# Patient Record
Sex: MALE | Race: BLACK OR AFRICAN AMERICAN | NOT HISPANIC OR LATINO | Employment: FULL TIME | URBAN - METROPOLITAN AREA
[De-identification: names, ages, dates, MRNs, and addresses within clinical notes are randomized per-mention and may not be internally consistent; named-entity substitution may affect disease eponyms.]

---

## 2017-02-13 ENCOUNTER — OFFICE VISIT (OUTPATIENT)
Dept: FAMILY MEDICINE | Facility: CLINIC | Age: 22
End: 2017-02-13
Payer: COMMERCIAL

## 2017-02-13 VITALS — HEIGHT: 68 IN | HEART RATE: 70 BPM

## 2017-02-13 DIAGNOSIS — F33.1 MODERATE EPISODE OF RECURRENT MAJOR DEPRESSIVE DISORDER (H): Primary | ICD-10-CM

## 2017-02-13 DIAGNOSIS — R01.1 HEART MURMUR: ICD-10-CM

## 2017-02-13 PROCEDURE — 99214 OFFICE O/P EST MOD 30 MIN: CPT | Performed by: PHYSICIAN ASSISTANT

## 2017-02-13 ASSESSMENT — ANXIETY QUESTIONNAIRES
5. BEING SO RESTLESS THAT IT IS HARD TO SIT STILL: NEARLY EVERY DAY
6. BECOMING EASILY ANNOYED OR IRRITABLE: MORE THAN HALF THE DAYS
GAD7 TOTAL SCORE: 17
3. WORRYING TOO MUCH ABOUT DIFFERENT THINGS: NEARLY EVERY DAY
7. FEELING AFRAID AS IF SOMETHING AWFUL MIGHT HAPPEN: MORE THAN HALF THE DAYS
2. NOT BEING ABLE TO STOP OR CONTROL WORRYING: NEARLY EVERY DAY
1. FEELING NERVOUS, ANXIOUS, OR ON EDGE: MORE THAN HALF THE DAYS

## 2017-02-13 ASSESSMENT — PATIENT HEALTH QUESTIONNAIRE - PHQ9: 5. POOR APPETITE OR OVEREATING: MORE THAN HALF THE DAYS

## 2017-02-13 NOTE — NURSING NOTE
"Chief Complaint   Patient presents with     Depression       Initial BP (P) 120/70 (BP Location: Right arm, Patient Position: Supine, Cuff Size: Adult Regular)  Pulse 70  Temp (P) 98.5  F (36.9  C) (Oral)  Ht 5' 7.5\" (1.715 m)  Wt (P) 166 lb (75.3 kg)  BMI (P) 25.62 kg/m2 Estimated body mass index is 25.62 kg/(m^2) (pended) as calculated from the following:    Height as of this encounter: 5' 7.5\" (1.715 m).    Weight as of this encounter: (P) 166 lb (75.3 kg).  Medication Reconciliation: complete   Bettye Williamson MA      "

## 2017-02-13 NOTE — PROGRESS NOTES
"  SUBJECTIVE:                                                    Artis Chou is a 21 year old male who presents to clinic today for the following health issues:      Abnormal Mood Symptoms     Onset: ongoing, has never talked to anyone about it    Description:   Depression: YES  Anxiety: YES    Accompanying Signs & Symptoms:  Still participating in activities that you used to enjoy: no  Fatigue: YES  Irritability: YES  Difficulty concentrating: YES  Changes in appetite: YES, hasn't ate for two days  Problems with sleep: YES- sleeps 4 hours at a time, awakes and falls back to sleep, always feels tired  Heart racing/beating fast : YES  Thoughts of hurting yourself or others: yes, not currently     History:   Recent stress: YES  Prior depression hospitalization: None  Family history of depression: unsure  Family history of anxiety: unsure      Precipitating factors:   Alcohol/drug use: YES- marijuana use and occasional alcohol use    Alleviating factors:         Therapies Tried and outcome: None      -------------------------------------    Problem list, Medication list, Allergies, and Medical/Social/Surgical histories reviewed in [facility] and updated as appropriate.    ROS:  A pertinent ROS of the General  Psychological systems is otherwise unremarkable.      OBJECTIVE:                                                    BP (P) 120/70 (BP Location: Right arm, Patient Position: Supine, Cuff Size: Adult Regular)  Pulse 70  Temp (P) 98.5  F (36.9  C) (Oral)  Ht 5' 7.5\" (1.715 m)  Wt (P) 166 lb (75.3 kg)  BMI (P) 25.62 kg/m2   GENERAL: healthy, alert and no distress  MENTAL STATUS EXAM:  Appearance/Behavior: No apparent distress and Casually groomed  Speech: Normal  Mood/Affect: depressed affect  Insight: Adequate  Denies suicidal ideation.   CVS exam: S1, S2 normal, no murmur, click, rub or gallop, regular rate and rhythm, 2/6 systolic murmur heard at 2nd right intercostal space, chest is clear without rales or " wheezing,       Diagnostic test results:  Diagnostic Test Results:  none      ASSESSMENT/PLAN:                                                      1. Moderate episode of recurrent major depressive disorder (H)  Reviewed treatment options and patient will try medication and therapy.   Side effects, risks and benefits of medication were discussed with patient including worsening depression. He will call right away if this occurs.   Follow up in 3-4 weeks for recheck.   - sertraline (ZOLOFT) 50 MG tablet; Take 1/2 tablet (25 mg) for 1-2 weeks, then increase to 1 tablet orally daily  Dispense: 30 tablet; Refill: 0  - MENTAL HEALTH REFERRAL    2. Heart murmur  Found on exam. Patient was very nervous when discussing this and he had a vasovagal episode in the clinic that last 20 seconds. He came to and had some water and crackers and CVS/ PULMONARY exam was normal afterwards.   - Echocardiogram Complete; Future       Nicolasa Russell PA-C  [facility]

## 2017-02-13 NOTE — PATIENT INSTRUCTIONS
-- start on 1/2 tablet daily then if tolerating increase to whole tablet.   -- start in therapy  -- Consider reading book Mind Over Mood  -- strongly encourage you to quit smoking marijuana.    [facility]   Discharged by : Chelle HUTTON, Certified Medical Assistant (DONI)February 13, 2017 12:20 PM    Paper scripts provided to patient : none   If you have any questions regarding to your visit please contact your care team:   Team Gold Clinic Hours Telephone Number   Dr. Chelle Russell PA-C   7am-7pm Monday - Thursday   7am-5pm Fridays  [phone]   (Appointment scheduling available 24/7)   RN Line   [phone] option 2     What options do I have for visits at the clinic other than the traditional office visit?   To expand how we care for you, many of our providers are utilizing electronic visits (e-visits) and telephone visits, when medically appropriate, for interactions with their patients rather than a visit in the clinic. We also offer nurse visits for many medical concerns. Just like any other service, we will bill your insurance company for this type of visit based on time spent on the phone with your provider. Not all insurance companies cover these visits. Please check with your medical insurance if this type of visit is covered. You will be responsible for any charges that are not paid by your insurance.   E-visits via "[employer]hart: generally incur a $35.00 fee.   Telephone visits:   Time spent on the phone: *charged based on time that is spent on the phone in increments of 10 minutes. Estimated cost:   5-10 mins $30.00   11-20 mins. $59.00   21-30 mins. $85.00   Use Avalara (secure email communication and access to your chart) to send your primary care provider a message or make an appointment. Ask someone on your Team how to sign up for Avalara.   For a Price Quote for your services, please call our Consumer Price Line at [phone].   As  always, Thank you for trusting us with your health care needs!                    [facility]:    Scheduling Appointments  [facility]  Call: [phone]    [facility]  Call: [phone]    [facility]  Call: [phone]    WHERE TO GO FOR CARE?    Clinic    Make an appointment if you:       Are sick (cold, cough, flu, sore throat, earache or in pain).       Have a small injury (sprain, small cut, burn or broken bone).       Need a physical exam, Pap smear, vaccine or prescription refill.       Have questions about your health or medicines.    To reach us:      Call [phone] ([phone]). Open 24 hours every day. (For counseling services, call [phone].)    Log into Handmade Mobile at [facility]. (Visit [email or web address] CenterYellowDog Media.org to create an account.) Hospital emergency room    An emergency is a serious or life- threatening problem that must be treated right away.    Call [phone] or get to the hospital if you have:      Very bad or sudden:            - Chest pain or pressure         - Bleeding         - Head or belly pain         - Dizziness or trouble seeing, walking or                          Speaking      Problems breathing      Blood in your vomit or you are coughing up blood      A major injury (knocked out, loss of a finger or limb, rape, broken bone protruding from skin)    A mental health crisis. (Or call the Mental Health Crisis line at 1-162.524.7138 or Suicide Prevention Hotline at 1-498.840.7763.)    Open 24 hours every day. You don't need an appointment.     Urgent care    Visit urgent care for sickness or small injuries when the clinic is closed. You don't need an appointment. To check hours or find an urgent care near you, visit [email or web address]. Online care    Get online care from [facility] for more than 70 common problems, like colds, allergies and infections. Open 24 hours every day at:  [email or web address]   Need help deciding?    For advice about where to be seen, you may call your clinic and ask to speak with a nurse. We're here for you 24 hours every day.         If you are deaf or hard of hearing, please let us know. We provide many free services including sign language interpreters, oral interpreters, TTYs, telephone amplifiers, note takers and written materials.

## 2017-02-13 NOTE — MR AVS SNAPSHOT
After Visit Summary   2/13/2017    Artis Chou    MRN: [id number]           Patient Information     Date Of Birth          1995        Visit Information        Provider Department      2/13/2017 11:20 AM Nicolasa Russell PA-C [facility]        Today's Diagnoses     Moderate episode of recurrent major depressive disorder (H)    -  1    Heart murmur          Care Instructions    -- start on 1/2 tablet daily then if tolerating increase to whole tablet.   -- start in therapy  -- Consider reading book Mind Over Mood  -- strongly encourage you to quit smoking marijuana.    [facility]   Discharged by : Chelle HUTTON Certified Medical Assistant (AAMA)February 13, 2017 12:20 PM    Paper scripts provided to patient : none   If you have any questions regarding to your visit please contact your care team:   Team Gold Clinic Hours Telephone Number   Dr. Chelle Russell PA-C   7am-7pm Monday - Thursday   7am-5pm Fridays  [phone]   (Appointment scheduling available 24/7)   RN Line   [phone] option 2     What options do I have for visits at the clinic other than the traditional office visit?   To expand how we care for you, many of our providers are utilizing electronic visits (e-visits) and telephone visits, when medically appropriate, for interactions with their patients rather than a visit in the clinic. We also offer nurse visits for many medical concerns. Just like any other service, we will bill your insurance company for this type of visit based on time spent on the phone with your provider. Not all insurance companies cover these visits. Please check with your medical insurance if this type of visit is covered. You will be responsible for any charges that are not paid by your insurance.   E-visits via [employer]: generally incur a $35.00 fee.   Telephone visits:   Time spent on the phone: *charged based  on time that is spent on the phone in increments of 10 minutes. Estimated cost:   5-10 mins $30.00   11-20 mins. $59.00   21-30 mins. $85.00   Use MojoPages (secure email communication and access to your chart) to send your primary care provider a message or make an appointment. Ask someone on your Team how to sign up for MojoPages.   For a Price Quote for your services, please call our Consumer Price Line at [phone].   As always, Thank you for trusting us with your health care needs!                    [facility]:    Scheduling Appointments  [facility]  Call: [phone]    [facility]  Call: [phone]    [facility]  Call: [phone]    WHERE TO GO FOR CARE?    Clinic    Make an appointment if you:       Are sick (cold, cough, flu, sore throat, earache or in pain).       Have a small injury (sprain, small cut, burn or broken bone).       Need a physical exam, Pap smear, vaccine or prescription refill.       Have questions about your health or medicines.    To reach us:      Call [phone] ([phone]). Open 24 hours every day. (For counseling services, call [phone].)    Log into MojoPages at [email or web address]. (Visit [email or web address].org to create an account.) Hospital emergency room    An emergency is a serious or life- threatening problem that must be treated right away.    Call [phone] or get to the hospital if you have:      Very bad or sudden:            - Chest pain or pressure         - Bleeding         - Head or belly pain         - Dizziness or trouble seeing, walking or                          Speaking      Problems breathing      Blood in your vomit or you are coughing up blood      A major injury (knocked out, loss of a finger or limb, rape, broken bone protruding from skin)    A mental health crisis. (Or call the Mental Health Crisis line at 1-726.881.6117 or Suicide Prevention Hotline at  6-711-165-4278.)    Open 24 hours every day. You don't need an appointment.     Urgent care    Visit urgent care for sickness or small injuries when the clinic is closed. You don't need an appointment. To check hours or find an urgent care near you, visit [email or web address]. Online care    Get online care from [facility] for more than 70 common problems, like colds, allergies and infections. Open 24 hours every day at: [email or web address]   Need help deciding?    For advice about where to be seen, you may call your clinic and ask to speak with a nurse. We're here for you 24 hours every day.         If you are deaf or hard of hearing, please let us know. We provide many free services including sign language interpreters, oral interpreters, TTYs, telephone amplifiers, note takers and written materials.               Follow-ups after your visit        Additional Services     MENTAL HEALTH REFERRAL       Your provider has referred you to: FMG: [facility] - Counseling (Individual/Couples/Family) - [facility] [phone]   [email or web address]/   *Patient will be contacted by [facility] [facility]'s scheduling partner, [facility]), to schedule an appointment.  Patients may also call [facility] to schedule.    All scheduling is subject to the client's specific insurance plan & benefits, provider/location availability, and provider clinical specialities.  Please arrive 15 minutes early for your first appointment and bring your completed paperwork.    Please be aware that coverage of these services is subject to the terms and limitations of your health insurance plan.  Call member services at your health plan with any benefit or coverage questions.                  Follow-up notes from your care team     Return in about 4 weeks (around 3/13/2017) for depression.      Future tests that were  "ordered for you today     Open Future Orders        Priority Expected Expires Ordered    Echocardiogram Complete Routine  2/13/2018 2/13/2017            Who to contact     If you have questions or need follow up information about today's clinic visit or your schedule please contact [facility] directly at [phone].  Normal or non-critical lab and imaging results will be communicated to you by MyChart, letter or phone within 4 business days after the clinic has received the results. If you do not hear from us within 7 days, please contact the clinic through AetherPalhart or phone. If you have a critical or abnormal lab result, we will notify you by phone as soon as possible.  Submit refill requests through OfferWire or call your pharmacy and they will forward the refill request to us. Please allow 3 business days for your refill to be completed.          Additional Information About Your Visit        MyChart Information     OfferWire gives you secure access to your electronic health record. If you see a primary care provider, you can also send messages to your care team and make appointments. If you have questions, please call your primary care clinic.  If you do not have a primary care provider, please call [phone] and they will assist you.        Care EveryWhere ID     This is your Care EveryWhere ID. This could be used by other organizations to access your [facility] medical records  [id number]        Your Vitals Were     Pulse Height                70 5' 7.5\" (1.715 m)           Blood Pressure from Last 3 Encounters:   02/13/17 (P) 120/70   05/26/16 115/61   10/29/13 115/65    Weight from Last 3 Encounters:   02/13/17 (P) 166 lb (75.3 kg)   05/26/16 155 lb (70.3 kg)   10/29/13 164 lb (74.4 kg)              We Performed the Following     MENTAL HEALTH REFERRAL          Today's Medication Changes          These changes are accurate as of: 2/13/17 12:20 PM.  If you have any questions, ask your nurse " or doctor.               Start taking these medicines.        Dose/Directions    sertraline 50 MG tablet   Commonly known as:  ZOLOFT   Used for:  Moderate episode of recurrent major depressive disorder (H)   Started by:  Nicolasa Russell PA-C        Take 1/2 tablet (25 mg) for 1-2 weeks, then increase to 1 tablet orally daily   Quantity:  30 tablet   Refills:  0         Stop taking these medicines if you haven't already. Please contact your care team if you have questions.     HYDROcodone-acetaminophen 5-325 MG per tablet   Commonly known as:  NORCO   Stopped by:  Nicolasa Russell PA-C           ibuprofen 600 MG tablet   Commonly known as:  ADVIL/MOTRIN   Stopped by:  Nicolasa Russell PA-C           PAIN & FEVER EXTRA STRENGTH 500 MG tablet   Generic drug:  acetaminophen   Stopped by:  Nicolasa Russell PA-C           sulfamethoxazole-trimethoprim 800-160 MG per tablet   Commonly known as:  BACTRIM DS/SEPTRA DS   Stopped by:  Nicolasa Russell PA-C                Where to get your medicines      These medications were sent to [address] [address]     Phone:  [phone]     sertraline 50 MG tablet                Primary Care Provider Office Phone #    [facility] [phone]       [address] [place] [address]        Thank you!     Thank you for choosing [facility]  for your care. Our goal is always to provide you with excellent care. Hearing back from our patients is one way we can continue to improve our services. Please take a few minutes to complete the written survey that you may receive in the mail after your visit with us. Thank you!             Your Updated Medication List - Protect others around you: Learn how to safely use, store and throw away your medicines at www.disposemymeds.org.          This list is accurate as of: 2/13/17 12:20 PM.   Always use your most recent med list.                   Brand Name Dispense Instructions for use    sertraline 50 MG tablet    ZOLOFT    30 tablet    Take 1/2 tablet (25 mg) for 1-2 weeks, then increase to 1 tablet orally daily

## 2017-02-14 ASSESSMENT — PATIENT HEALTH QUESTIONNAIRE - PHQ9: SUM OF ALL RESPONSES TO PHQ QUESTIONS 1-9: 20

## 2017-02-14 ASSESSMENT — ANXIETY QUESTIONNAIRES: GAD7 TOTAL SCORE: 17

## 2017-02-15 ENCOUNTER — RADIANT APPOINTMENT (OUTPATIENT)
Dept: CARDIOLOGY | Facility: CLINIC | Age: 22
End: 2017-02-15
Attending: PHYSICIAN ASSISTANT
Payer: COMMERCIAL

## 2017-02-15 ENCOUNTER — TELEPHONE (OUTPATIENT)
Dept: FAMILY MEDICINE | Facility: CLINIC | Age: 22
End: 2017-02-15

## 2017-02-15 DIAGNOSIS — R01.1 HEART MURMUR: ICD-10-CM

## 2017-02-15 PROCEDURE — 93306 TTE W/DOPPLER COMPLETE: CPT | Performed by: INTERNAL MEDICINE

## 2017-02-15 NOTE — TELEPHONE ENCOUNTER
Please call patient to let him know his Echo is normal. There are no concerns for his  Heart. His murmur is likely functional.    Nicolasa Russell, MYRNA, PA-C  [facility]

## 2017-02-15 NOTE — TELEPHONE ENCOUNTER
Called and spoke to patient, relayed message (below). Patient verbalized understanding.      Jia Montano RN   February 15, 2017 5:28 PM  [facility] Triage   [phone]

## 2017-03-03 ENCOUNTER — OFFICE VISIT (OUTPATIENT)
Dept: FAMILY MEDICINE | Facility: CLINIC | Age: 22
End: 2017-03-03
Payer: COMMERCIAL

## 2017-03-03 VITALS
HEART RATE: 70 BPM | WEIGHT: 165 LBS | TEMPERATURE: 98.1 F | HEIGHT: 68 IN | SYSTOLIC BLOOD PRESSURE: 116 MMHG | BODY MASS INDEX: 25.01 KG/M2 | DIASTOLIC BLOOD PRESSURE: 68 MMHG

## 2017-03-03 DIAGNOSIS — F12.10 MARIJUANA ABUSE: ICD-10-CM

## 2017-03-03 DIAGNOSIS — F33.1 MAJOR DEPRESSIVE DISORDER, RECURRENT EPISODE, MODERATE (H): Primary | ICD-10-CM

## 2017-03-03 DIAGNOSIS — F41.1 GAD (GENERALIZED ANXIETY DISORDER): ICD-10-CM

## 2017-03-03 PROCEDURE — 99213 OFFICE O/P EST LOW 20 MIN: CPT | Performed by: PHYSICIAN ASSISTANT

## 2017-03-03 RX ORDER — SERTRALINE HYDROCHLORIDE 100 MG/1
100 TABLET, FILM COATED ORAL DAILY
Qty: 30 TABLET | Refills: 1 | Status: SHIPPED | OUTPATIENT
Start: 2017-03-03 | End: 2017-04-10

## 2017-03-03 RX ORDER — HYDROXYZINE HYDROCHLORIDE 25 MG/1
25-50 TABLET, FILM COATED ORAL EVERY 6 HOURS PRN
Qty: 60 TABLET | Refills: 1 | Status: SHIPPED | OUTPATIENT
Start: 2017-03-03 | End: 2017-04-10

## 2017-03-03 ASSESSMENT — ANXIETY QUESTIONNAIRES
2. NOT BEING ABLE TO STOP OR CONTROL WORRYING: NEARLY EVERY DAY
3. WORRYING TOO MUCH ABOUT DIFFERENT THINGS: NEARLY EVERY DAY
6. BECOMING EASILY ANNOYED OR IRRITABLE: NEARLY EVERY DAY
GAD7 TOTAL SCORE: 20
1. FEELING NERVOUS, ANXIOUS, OR ON EDGE: NEARLY EVERY DAY
5. BEING SO RESTLESS THAT IT IS HARD TO SIT STILL: NEARLY EVERY DAY
7. FEELING AFRAID AS IF SOMETHING AWFUL MIGHT HAPPEN: MORE THAN HALF THE DAYS

## 2017-03-03 ASSESSMENT — PATIENT HEALTH QUESTIONNAIRE - PHQ9: 5. POOR APPETITE OR OVEREATING: NEARLY EVERY DAY

## 2017-03-03 NOTE — PATIENT INSTRUCTIONS
[facility]   Discharged by : Bettye Williamson MA    Paper scripts provided to patient : no     If you have any questions regarding your visit please contact your care team:     Team Gold Clinic Hours Telephone Number   Dr. Chelle Russell PA-C   7am-7pm Monday - Thursday   7am-5pm Fridays  [phone]   (Appointment scheduling available 24/7)   RN Line   [phone] option 2       For a Price Quote for your services, please call our Consumer Price Line at [phone].     What options do I have for visits at the clinic other than the traditional office visit?     To expand how we care for you, many of our providers are utilizing electronic visits (e-visits) and telephone visits, when medically appropriate, for interactions with their patients rather than a visit in the clinic. We also offer nurse visits for many medical concerns. Just like any other service, we will bill your insurance company for this type of visit based on time spent on the phone with your provider. Not all insurance companies cover these visits. Please check with your medical insurance if this type of visit is covered. You will be responsible for any charges that are not paid by your insurance.   E-visits via [employer]: generally incur a $35.00 fee.     Telephone visits:   Time spent on the phone: *charged based on time that is spent on the phone in increments of 10 minutes. Estimated cost:   5-10 mins $30.00   11-20 mins. $59.00   21-30 mins. $85.00     Use Broken Envelope Productionshart (secure email communication and access to your chart) to send your primary care provider a message or make an appointment. Ask someone on your Team how to sign up for Easy-Pointt.     As always, Thank you for trusting us with your health care needs!      [facility]:    Scheduling Appointments  [facility]  Call: [phone]    [facility]  Call:  [phone]    [facility]  Call: [phone]      WHERE TO GO FOR CARE?    Clinic    Make an appointment if you:       Are sick (cold, cough, flu, sore throat, earache or in pain).       Have a small injury (sprain, small cut, burn or broken bone).       Need a physical exam, Pap smear, vaccine or prescription refill.       Have questions about your health or medicines.    To reach us:      Call [phone] ([phone]). Open 24 hours every day. (For counseling services, call [phone].)    Log into Airsynergy at [employer]. (Visit "[employer].LawPivot to create an account.) Hospital emergency room    An emergency is a serious or life- threatening problem that must be treated right away.    Call [phone] or get to the hospital if you have:      Very bad or sudden:            - Chest pain or pressure         - Bleeding         - Head or belly pain         - Dizziness or trouble seeing, walking or                          Speaking      Problems breathing      Blood in your vomit or you are coughing up blood      A major injury (knocked out, loss of a finger or limb, rape, broken bone protruding from skin)    A mental health crisis. (Or call the Mental Health Crisis line at 1-618.804.5679 or Suicide Prevention Hotline at 1-552.388.1417.)    Open 24 hours every day. You don't need an appointment.     Urgent care    Visit urgent care for sickness or small injuries when the clinic is closed. You don't need an appointment. To check hours or find an urgent care near you, visit www.[email or web address]. Online care    Get online care from [employer] for more than 70 common problems, like colds, allergies and infections. Open 24 hours every day at: [email or web address]   Need help deciding?    For advice about where to be seen, you may call your clinic and ask to speak with a nurse. We're here for you 24 hours every day.         If you are deaf or hard of hearing, please let us  know. We provide many free services including sign language interpreters, oral interpreters, TTYs, telephone amplifiers, note takers and written materials.

## 2017-03-03 NOTE — NURSING NOTE
"Chief Complaint   Patient presents with     Follow Up For     depression/anxiety       Initial /68 (BP Location: Right arm, Patient Position: Chair, Cuff Size: Adult Regular)  Pulse 70  Temp 98.1  F (36.7  C) (Oral)  Ht 5' 7.5\" (1.715 m)  Wt 165 lb (74.8 kg)  BMI 25.46 kg/m2 Estimated body mass index is 25.46 kg/(m^2) as calculated from the following:    Height as of this encounter: 5' 7.5\" (1.715 m).    Weight as of this encounter: 165 lb (74.8 kg).  Medication Reconciliation: complete   Bettye Williamson MA      "

## 2017-03-03 NOTE — PROGRESS NOTES
"  SUBJECTIVE:                                                    Artis Chou is a 21 year old male who presents to clinic today for the following health issues:        Depression and Anxiety Follow-Up    Status since last visit: Improved, use of zoloft going well    Tried friend's klonopin which was very helpful for his social anxiety problem.    Other associated symptoms:None    Complicating factors:     Significant life event: No     Current substance abuse: None    PHQ-9 SCORE 2/13/2017   Total Score 20     KATY-7 SCORE 2/13/2017   Total Score 17        PHQ-9  English      PHQ-9   Any Language     GAD7      Problems taking medications regularly: No    Medication side effects: none  Diet: regular (no restrictions)    Working at [employer].   -------------------------------------    Problem list, Medication list, Allergies, and Medical/Social/Surgical histories [facility] and updated as appropriate.    ROS:  A pertinent ROS of the General  Psychological systems is otherwise unremarkable.      OBJECTIVE:                                                    /68 (BP Location: Right arm, Patient Position: Chair, Cuff Size: Adult Regular)  Pulse 70  Temp 98.1  F (36.7  C) (Oral)  Ht 5' 7.5\" (1.715 m)  Wt 165 lb (74.8 kg)  BMI 25.46 kg/m2 .  GENERAL:: healthy, alert and no distress  PSYCH: Alert and oriented times 3; speech- coherent , normal rate and volume; able to articulate logical thoughts, able to abstract reason, no tangential thoughts, no hallucinations or delusions, affect- anxious    Diagnostic test results:  Diagnostic Test Results:  none      ASSESSMENT/PLAN:                                                          ICD-10-CM    1. Major depressive disorder, recurrent episode, moderate (H) F33.1 sertraline (ZOLOFT) 100 MG tablet   2. KATY (generalized anxiety disorder) F41.1 hydrOXYzine (ATARAX) 25 MG tablet       Discussed with patient that given his use of marijuana would " recommend against  Using benzo's for treatment of acute anxiety.  Therapy is most beneficial, then trial of hydroxyzine  Side effects, risks and benefits of medication were discussed with patient.    Increase zoloft to 100 mg to see if improvement in symptoms.    Follow up with Provider - 1 month         Nicolasa Russell PA-C  [facility]

## 2017-03-03 NOTE — MR AVS SNAPSHOT
After Visit Summary   3/3/2017    Artis Chou    MRN: [id number]           Patient Information     Date Of Birth          1995        Visit Information        Provider Department      3/3/2017 11:00 AM Nicolasa Russell PA-C [facility]        Today's Diagnoses     Major depressive disorder, recurrent episode, moderate (H)    -  1    KATY (generalized anxiety disorder)          Care Instructions    [facility]   Discharged by : Bettye Williamson MA    Paper scripts provided to patient : no     If you have any questions regarding your visit please contact your care team:     [facility] Clinic Hours Telephone Number   Dr. Chelle Russell PA-C   7am-7pm Monday - Thursday   7am-5pm Fridays  [phone]   (Appointment scheduling available 24/7)   RN Line   [phone] option 2       For a Price Quote for your services, please call our [facility] Price Line at [phone].     What options do I have for visits at the clinic other than the traditional office visit?     To expand how we care for you, many of our providers are utilizing electronic visits (e-visits) and telephone visits, when medically appropriate, for interactions with their patients rather than a visit in the clinic. We also offer nurse visits for many medical concerns. Just like any other service, we will bill your insurance company for this type of visit based on time spent on the phone with your provider. Not all insurance companies cover these visits. Please check with your medical insurance if this type of visit is covered. You will be responsible for any charges that are not paid by your insurance.   E-visits via WaveDeck: generally incur a $35.00 fee.     Telephone visits:   Time spent on the phone: *charged based on time that is spent on the phone in increments of 10 minutes. Estimated cost:   5-10 mins $30.00   11-20 mins. $59.00    21-30 mins. $85.00     Use Asoka (secure email communication and access to your chart) to send your primary care provider a message or make an appointment. Ask someone on your Team how to sign up for Asoka.     As always, Thank you for trusting us with your health care needs!      [facility]:    Scheduling Appointments  Denzel Griffin [facility]  Call: [phone]    [facility]  Call: [phone]    [facility]  Call: [phone]      WHERE TO GO FOR CARE?    Clinic    Make an appointment if you:       Are sick (cold, cough, flu, sore throat, earache or in pain).       Have a small injury (sprain, small cut, burn or broken bone).       Need a physical exam, Pap smear, vaccine or prescription refill.       Have questions about your health or medicines.    To reach us:      Call [phone] ([phone]). Open 24 hours every day. (For counseling services, call [phone].)    Log into Asoka at [email or web address]. (Visit [email or web address] to create an account.) Hospital emergency room    An emergency is a serious or life- threatening problem that must be treated right away.    Call [phone] or get to the hospital if you have:      Very bad or sudden:            - Chest pain or pressure         - Bleeding         - Head or belly pain         - Dizziness or trouble seeing, walking or                          Speaking      Problems breathing      Blood in your vomit or you are coughing up blood      A major injury (knocked out, loss of a finger or limb, rape, broken bone protruding from skin)    A mental health crisis. (Or call the Mental Health Crisis line at 1-804.182.2785 or Suicide Prevention Hotline at 1-892.897.2546.)    Open 24 hours every day. You don't need an appointment.     Urgent care    Visit urgent care for sickness or small injuries when the clinic is closed. You don't need an appointment. To check hours or  find an urgent care near you, visit [email or web address]. Online care    Get online care from [facility] for more than 70 common problems, like colds, allergies and infections. Open 24 hours every day at: [email or web address]   Need help deciding?    For advice about where to be seen, you may call your clinic and ask to speak with a nurse. We're here for you 24 hours every day.         If you are deaf or hard of hearing, please let us know. We provide many free services including sign language interpreters, oral interpreters, TTYs, telephone amplifiers, note takers and written materials.                       Follow-ups after your visit        Your next 10 appointments already scheduled     Mar 13, 2017  1:00 PM CDT   Office Visit with Nicolasa Russell PA-C   [facility] ([facility]    [address] [place] [id number] [phone]           Bring a current list of meds and any records pertaining to this visit.  For Physicals, please bring immunization records and any forms needing to be filled out.  Please arrive 10 minutes early to complete paperwork.              Who to contact     If you have questions or need follow up information about today's clinic visit or your schedule please contact [facility] directly at [phone].  Normal or non-critical lab and imaging results will be communicated to you by MyChart, letter or phone within 4 business days after the clinic has received the results. If you do not hear from us within 7 days, please contact the clinic through MyChart or phone. If you have a critical or abnormal lab result, we will notify you by phone as soon as possible.  Submit refill requests through Meditech Solution or call your pharmacy and they will forward the refill request to us. Please allow 3 business days for your refill to be completed.          Additional Information About Your Visit        [id number] Security Information      "iZotope gives you secure access to your electronic health record. If you see a primary care provider, you can also send messages to your care team and make appointments. If you have questions, please call your primary care clinic.  If you do not have a primary care provider, please call [phone] and they will assist you.        Care EveryWhere ID     This is your Care EveryWhere ID. This could be used by other organizations to access your [place] medical records  [id number]        Your Vitals Were     Pulse Temperature Height BMI (Body Mass Index)          70 98.1  F (36.7  C) (Oral) 5' 7.5\" (1.715 m) 25.46 kg/m2         Blood Pressure from Last 3 Encounters:   03/03/17 116/68   02/13/17 (P) 120/70   05/26/16 115/61    Weight from Last 3 Encounters:   03/03/17 165 lb (74.8 kg)   02/13/17 (P) 166 lb (75.3 kg)   05/26/16 155 lb (70.3 kg)              Today, you had the following     No orders found for display         Today's Medication Changes          These changes are accurate as of: 3/3/17 11:40 AM.  If you have any questions, ask your nurse or doctor.               Start taking these medicines.        Dose/Directions    hydrOXYzine 25 MG tablet   Commonly known as:  ATARAX   Used for:  KATY (generalized anxiety disorder)   Started by:  Nicolasa Russell PA-C        Dose:  25-50 mg   Take 1-2 tablets (25-50 mg) by mouth every 6 hours as needed for anxiety   Quantity:  60 tablet   Refills:  1         These medicines have changed or have updated prescriptions.        Dose/Directions    * sertraline 50 MG tablet   Commonly known as:  ZOLOFT   This may have changed:  Another medication with the same name was added. Make sure you understand how and when to take each.   Used for:  Moderate episode of recurrent major depressive disorder (H)   Changed by:  Nicolasa Russell PA-C        Take 1/2 tablet (25 mg) for 1-2 weeks, then increase to 1 tablet orally daily   Quantity:  30 tablet   Refills:  0       * " sertraline 100 MG tablet   Commonly known as:  ZOLOFT   This may have changed:  You were already taking a medication with the same name, and this prescription was added. Make sure you understand how and when to take each.   Used for:  Major depressive disorder, recurrent episode, moderate (H)   Changed by:  Nicolasa Russell PA-C        Dose:  100 mg   Take 1 tablet (100 mg) by mouth daily   Quantity:  30 tablet   Refills:  1       * Notice:  This list has 2 medication(s) that are the same as other medications prescribed for you. Read the directions carefully, and ask your doctor or other care provider to review them with you.         Where to get your medicines      These medications were sent to [place] [id number] [place] [facility] - [place] - [address]  [address] [place] [address]     Phone:  [phone]     hydrOXYzine 25 MG tablet    sertraline 100 MG tablet                Primary Care Provider Office Phone #    [facility] [phone]       [address] [place] [id number]        Thank you!     Thank you for choosing [facility]  for your care. Our goal is always to provide you with excellent care. Hearing back from our patients is one way we can continue to improve our services. Please take a few minutes to complete the written survey that you may receive in the mail after your visit with us. Thank you!             Your Updated Medication List - Protect others around you: Learn how to safely use, store and throw away your medicines at www.disposemymeds.org.          This list is accurate as of: 3/3/17 11:40 AM.  Always use your most recent med list.                   Brand Name Dispense Instructions for use    hydrOXYzine 25 MG tablet    ATARAX    60 tablet    Take 1-2 tablets (25-50 mg) by mouth every 6 hours as needed for anxiety       * sertraline 50 MG tablet    ZOLOFT    30 tablet    Take 1/2 tablet (25 mg) for 1-2 weeks, then increase to 1 tablet  orally daily       * sertraline 100 MG tablet    ZOLOFT    30 tablet    Take 1 tablet (100 mg) by mouth daily       * Notice:  This list has 2 medication(s) that are the same as other medications prescribed for you. Read the directions carefully, and ask your doctor or other care provider to review them with you.

## 2017-03-04 ASSESSMENT — PATIENT HEALTH QUESTIONNAIRE - PHQ9: SUM OF ALL RESPONSES TO PHQ QUESTIONS 1-9: 14

## 2017-03-04 ASSESSMENT — ANXIETY QUESTIONNAIRES: GAD7 TOTAL SCORE: 20

## 2017-04-10 ENCOUNTER — OFFICE VISIT (OUTPATIENT)
Dept: FAMILY MEDICINE | Facility: CLINIC | Age: 22
End: 2017-04-10
Payer: COMMERCIAL

## 2017-04-10 VITALS
SYSTOLIC BLOOD PRESSURE: 128 MMHG | HEART RATE: 84 BPM | DIASTOLIC BLOOD PRESSURE: 82 MMHG | HEIGHT: 68 IN | BODY MASS INDEX: 24.25 KG/M2 | TEMPERATURE: 98.3 F | WEIGHT: 160 LBS

## 2017-04-10 DIAGNOSIS — Z11.3 SCREENING EXAMINATION FOR VENEREAL DISEASE: ICD-10-CM

## 2017-04-10 DIAGNOSIS — F41.1 GAD (GENERALIZED ANXIETY DISORDER): ICD-10-CM

## 2017-04-10 DIAGNOSIS — F33.1 MAJOR DEPRESSIVE DISORDER, RECURRENT EPISODE, MODERATE (H): ICD-10-CM

## 2017-04-10 DIAGNOSIS — F12.20 CANNABIS DEPENDENCE (H): Primary | ICD-10-CM

## 2017-04-10 PROCEDURE — 99213 OFFICE O/P EST LOW 20 MIN: CPT | Performed by: PHYSICIAN ASSISTANT

## 2017-04-10 RX ORDER — SERTRALINE HYDROCHLORIDE 100 MG/1
100 TABLET, FILM COATED ORAL DAILY
Qty: 90 TABLET | Refills: 1 | Status: SHIPPED | OUTPATIENT
Start: 2017-04-10 | End: 2018-10-30

## 2017-04-10 RX ORDER — HYDROXYZINE HYDROCHLORIDE 25 MG/1
25-50 TABLET, FILM COATED ORAL EVERY 6 HOURS PRN
Qty: 60 TABLET | Refills: 5 | Status: SHIPPED | OUTPATIENT
Start: 2017-04-10 | End: 2018-10-30

## 2017-04-10 ASSESSMENT — ANXIETY QUESTIONNAIRES
6. BECOMING EASILY ANNOYED OR IRRITABLE: MORE THAN HALF THE DAYS
3. WORRYING TOO MUCH ABOUT DIFFERENT THINGS: SEVERAL DAYS
5. BEING SO RESTLESS THAT IT IS HARD TO SIT STILL: MORE THAN HALF THE DAYS
2. NOT BEING ABLE TO STOP OR CONTROL WORRYING: SEVERAL DAYS
7. FEELING AFRAID AS IF SOMETHING AWFUL MIGHT HAPPEN: SEVERAL DAYS
1. FEELING NERVOUS, ANXIOUS, OR ON EDGE: SEVERAL DAYS
GAD7 TOTAL SCORE: 9

## 2017-04-10 ASSESSMENT — PATIENT HEALTH QUESTIONNAIRE - PHQ9: 5. POOR APPETITE OR OVEREATING: SEVERAL DAYS

## 2017-04-10 NOTE — MR AVS SNAPSHOT
After Visit Summary   4/10/2017    Artis Chou    MRN: [id number]           Patient Information     Date Of Birth          1995        Visit Information        Provider Department      4/10/2017 2:00 PM Nicolasa Russell PA-C [facility]        Today's Diagnoses     Screening examination for venereal disease        Need for HPV vaccine        KATY (generalized anxiety disorder)        Major depressive disorder, recurrent episode, moderate (H)          Care Instructions    [facility]   Discharged by : Bettye Williamson MA    Paper scripts provided to patient :  no     If you have any questions regarding your visit please contact your care team:     [facility] Clinic Hours Telephone Number   Dr. Chelle Russell PA-C   7am-7pm Monday - Thursday   7am-5pm Fridays  [phone]   (Appointment scheduling available 24/7)   RN Line   [phone] option 2       For a Price Quote for your services, please call our [employer] Price Line at [phone].     What options do I have for visits at the clinic other than the traditional office visit?     To expand how we care for you, many of our providers are utilizing electronic visits (e-visits) and telephone visits, when medically appropriate, for interactions with their patients rather than a visit in the clinic. We also offer nurse visits for many medical concerns. Just like any other service, we will bill your insurance company for this type of visit based on time spent on the phone with your provider. Not all insurance companies cover these visits. Please check with your medical insurance if this type of visit is covered. You will be responsible for any charges that are not paid by your insurance.   E-visits via Yik Yak: generally incur a $35.00 fee.     Telephone visits:   Time spent on the phone: *charged based on time that is spent on the phone in increments  of 10 minutes. Estimated cost:   5-10 mins $30.00   11-20 mins. $59.00   21-30 mins. $85.00     Use [employer] (secure email communication and access to your chart) to send your primary care provider a message or make an appointment. Ask someone on your Team how to sign up for [employer].     As always, Thank you for trusting us with your health care needs!      [facility]:    Scheduling Appointments  Denzel Griffin [facility]  Call: [phone]    [facility]  Call: [phone]    [facility]  Call: [phone]      WHERE TO GO FOR CARE?    Clinic    Make an appointment if you:       Are sick (cold, cough, flu, sore throat, earache or in pain).       Have a small injury (sprain, small cut, burn or broken bone).       Need a physical exam, Pap smear, vaccine or prescription refill.       Have questions about your health or medicines.    To reach us:      Call [phone] ([phone]). Open 24 hours every day. (For counseling services, call [phone].)    Log into [employer] at [email or web address]. (Visit [email or web address] to create an account.) Hospital emergency room    An emergency is a serious or life- threatening problem that must be treated right away.    Call 443 or get to the hospital if you have:      Very bad or sudden:            - Chest pain or pressure         - Bleeding         - Head or belly pain         - Dizziness or trouble seeing, walking or                          Speaking      Problems breathing      Blood in your vomit or you are coughing up blood      A major injury (knocked out, loss of a finger or limb, rape, broken bone protruding from skin)    A mental health crisis. (Or call the Mental Health Crisis line at 1-748.541.3178 or Suicide Prevention Hotline at 1-481.717.9640.)    Open 24 hours every day. You don't need an appointment.     Urgent care    Visit urgent care for sickness or small injuries when  the clinic is closed. You don't need an appointment. To check hours or find an urgent care near you, visit [email or web address]. Online care    Get online care from [facility] for more than 70 common problems, like colds, allergies and infections. Open 24 hours every day at: [email or web address]   Need help deciding?    For advice about where to be seen, you may call your clinic and ask to speak with a nurse. We're here for you 24 hours every day.         If you are deaf or hard of hearing, please let us know. We provide many free services including sign language interpreters, oral interpreters, TTYs, telephone amplifiers, note takers and written materials.                       Follow-ups after your visit        Future tests that were ordered for you today     Open Future Orders        Priority Expected Expires Ordered    NEISSERIA GONORRHOEA PCR Routine  5/10/2017 4/10/2017    CHLAMYDIA TRACHOMATIS PCR Routine  5/10/2017 4/10/2017            Who to contact     If you have questions or need follow up information about today's clinic visit or your schedule please contact [facility] directly at [phone].  Normal or non-critical lab and imaging results will be communicated to you by MyChart, letter or phone within 4 business days after the clinic has received the results. If you do not hear from us within 7 days, please contact the clinic through thrdPlacehart or phone. If you have a critical or abnormal lab result, we will notify you by phone as soon as possible.  Submit refill requests through [employer] or call your pharmacy and they will forward the refill request to us. Please allow 3 business days for your refill to be completed.          Additional Information About Your Visit        thrdPlaceharMaxPreps Information     [employer] gives you secure access to your electronic health record. If you see a primary care provider, you can also send messages to your care team and make appointments. If you have  "questions, please call your primary care clinic.  If you do not have a primary care provider, please call [phone] and they will assist you.        Care EveryWhere ID     This is your Care EveryWhere ID. This could be used by other organizations to access your [employer] medical records  [id number]        Your Vitals Were     Pulse Temperature Height BMI (Body Mass Index)          84 98.3  F (36.8  C) (Oral) 5' 7.5\" (1.715 m) 24.69 kg/m2         Blood Pressure from Last 3 Encounters:   04/10/17 128/82   03/03/17 116/68   02/13/17 (P) 120/70    Weight from Last 3 Encounters:   04/10/17 160 lb (72.6 kg)   03/03/17 165 lb (74.8 kg)   02/13/17 (P) 166 lb (75.3 kg)                 Where to get your medicines      These medications were sent to [place] [address] [place] [facility] [place] - [address]  [address] [place] [address]     Phone:  [phone]     hydrOXYzine 25 MG tablet    sertraline 100 MG tablet          Primary Care Provider Office Phone #    [facility] [phone]       [address] [facility] [address]        Thank you!     Thank you for choosing [facility]  for your care. Our goal is always to provide you with excellent care. Hearing back from our patients is one way we can continue to improve our services. Please take a few minutes to complete the written survey that you may receive in the mail after your visit with us. Thank you!             Your Updated Medication List - Protect others around you: Learn how to safely use, store and throw away your medicines at www.disposemymeds.org.          This list is accurate as of: 4/10/17  2:40 PM.  Always use your most recent med list.                   Brand Name Dispense Instructions for use    hydrOXYzine 25 MG tablet    ATARAX    60 tablet    Take 1-2 tablets (25-50 mg) by mouth every 6 hours as needed for anxiety       sertraline 100 MG tablet    ZOLOFT    90 tablet    Take 1 tablet (100 mg) by " mouth daily

## 2017-04-10 NOTE — NURSING NOTE
"Chief Complaint   Patient presents with     Depression       Initial /82 (BP Location: Right arm, Patient Position: Chair, Cuff Size: Adult Regular)  Pulse 84  Temp 98.3  F (36.8  C) (Oral)  Ht 5' 7.5\" (1.715 m)  Wt 160 lb (72.6 kg)  BMI 24.69 kg/m2 Estimated body mass index is 24.69 kg/(m^2) as calculated from the following:    Height as of this encounter: 5' 7.5\" (1.715 m).    Weight as of this encounter: 160 lb (72.6 kg).  Medication Reconciliation: complete    "

## 2017-04-10 NOTE — PATIENT INSTRUCTIONS
[facility]   Discharged by : Bettye Williamson MA    Paper scripts provided to patient :  no     If you have any questions regarding your visit please contact your care team:     [facility] Clinic Hours Telephone Number   Dr. Chelle Russell PA-C   7am-7pm Monday - Thursday   7am-5pm Fridays  [phone]   (Appointment scheduling available 24/7)   RN Line   [phone] option 2       For a Price Quote for your services, please call our Consumer Price Line at [phone].     What options do I have for visits at the clinic other than the traditional office visit?     To expand how we care for you, many of our providers are utilizing electronic visits (e-visits) and telephone visits, when medically appropriate, for interactions with their patients rather than a visit in the clinic. We also offer nurse visits for many medical concerns. Just like any other service, we will bill your insurance company for this type of visit based on time spent on the phone with your provider. Not all insurance companies cover these visits. Please check with your medical insurance if this type of visit is covered. You will be responsible for any charges that are not paid by your insurance.   E-visits via Wacai: generally incur a $35.00 fee.     Telephone visits:   Time spent on the phone: *charged based on time that is spent on the phone in increments of 10 minutes. Estimated cost:   5-10 mins $30.00   11-20 mins. $59.00   21-30 mins. $85.00     Use [employer]art (secure email communication and access to your chart) to send your primary care provider a message or make an appointment. Ask someone on your Team how to sign up for [employer]Plann[employer].     As always, Thank you for trusting us with your health care needs!      [facility]:    Scheduling Appointments  [facility]  Call: [phone]    [facility]  Call:  [phone]    [facility]  Call: [phone]      WHERE TO GO FOR CARE?    Clinic    Make an appointment if you:       Are sick (cold, cough, flu, sore throat, earache or in pain).       Have a small injury (sprain, small cut, burn or broken bone).       Need a physical exam, Pap smear, vaccine or prescription refill.       Have questions about your health or medicines.    To reach us:      Call [phone] ([phone]). Open 24 hours every day. (For counseling services, call [phone].)    Log into [employer] at echoBase. (Visit [email or web address] to create an account.) Hospital emergency room    An emergency is a serious or life- threatening problem that must be treated right away.    Call [phone] or get to the hospital if you have:      Very bad or sudden:            - Chest pain or pressure         - Bleeding         - Head or belly pain         - Dizziness or trouble seeing, walking or                          Speaking      Problems breathing      Blood in your vomit or you are coughing up blood      A major injury (knocked out, loss of a finger or limb, rape, broken bone protruding from skin)    A mental health crisis. (Or call the Mental Health Crisis line at 1-488.911.7221 or Suicide Prevention Hotline at 1-680.996.3705.)    Open 24 hours every day. You don't need an appointment.     Urgent care    Visit urgent care for sickness or small injuries when the clinic is closed. You don't need an appointment. To check hours or find an urgent care near you, visit [email or web address]. Online care    Get online care from [employer] for more than 70 common problems, like colds, allergies and infections. Open 24 hours every day at: [email or web address]   Need help deciding?    For advice about where to be seen, you may call your clinic and ask to speak with a nurse. We're here for you 24 hours every day.         If you are deaf or hard of hearing, please let us  know. We provide many free services including sign language interpreters, oral interpreters, TTYs, telephone amplifiers, note takers and written materials.

## 2017-04-10 NOTE — PROGRESS NOTES
"  SUBJECTIVE:                                                    Artis Chou is a 22 year old male who presents to clinic today for the following health issues:        Depression and Anxiety Follow-Up    Status since last visit: fluctuates    Other associated symptoms:None    Complicating factors:     Significant life event: Yes-  Started new job in February     Current substance abuse: Cannabis, not interested in quitting.     Doing well on zoloft feels like moods are stable.  Hydroxyzine is helping with panic attacks.   Tried therapy, wasn't a good fit with therapist.       PHQ-9 SCORE 2/13/2017 3/3/2017 4/10/2017   Total Score 20 14 13     KATY-7 SCORE 2/13/2017 3/3/2017 4/10/2017   Total Score 17 20 9        PHQ-9  English      PHQ-9   Any Language     GAD7       Amount of exercise or physical activity: None    Problems taking medications regularly: No    Medication side effects: none    Diet: regular (no restrictions)        -------------------------------------    Problem list, Medication list, Allergies, and Medical/Social/Surgical histories reviewed in [employer] and updated as appropriate.    ROS:  A pertinent ROS of the General  Psychological systems is otherwise unremarkable.      OBJECTIVE:                                                    /82 (BP Location: Right arm, Patient Position: Chair, Cuff Size: Adult Regular)  Pulse 84  Temp 98.3  F (36.8  C) (Oral)  Ht 5' 7.5\" (1.715 m)  Wt 160 lb (72.6 kg)  BMI 24.69 kg/m2   GENERAL: healthy, alert and no distress  MENTAL STATUS EXAM:  Appearance/Behavior: No apparent distress and Casually groomed  Speech: Normal  Mood/Affect: normal affect  Insight: Adequate    Diagnostic test results:  Diagnostic Test Results:  none      ASSESSMENT/PLAN:                                                        ICD-10-CM    1. KATY (generalized anxiety disorder) F41.1 hydrOXYzine (ATARAX) 25 MG tablet   2. Major depressive disorder, recurrent episode, moderate (H) " F33.1 sertraline (ZOLOFT) 100 MG tablet   3. Screening examination for venereal disease Z11.3 NEISSERIA GONORRHOEA PCR     CHLAMYDIA TRACHOMATIS PCR     Moods are stable. Encouraged him to try another therapist.   Continue using medications as needed.   Strongly encouraged patient to quit smoking cannabis as this would help improve his energy level and sleep patterns.     Follow up with Provider - 3-6 months, sooner if needed.    Nicolasa Russell PA-C  [facility]

## 2017-04-11 ASSESSMENT — PATIENT HEALTH QUESTIONNAIRE - PHQ9: SUM OF ALL RESPONSES TO PHQ QUESTIONS 1-9: 13

## 2017-04-11 ASSESSMENT — ANXIETY QUESTIONNAIRES: GAD7 TOTAL SCORE: 9

## 2017-04-14 DIAGNOSIS — Z11.3 SCREENING EXAMINATION FOR VENEREAL DISEASE: ICD-10-CM

## 2017-04-14 PROCEDURE — 87491 CHLMYD TRACH DNA AMP PROBE: CPT | Performed by: PHYSICIAN ASSISTANT

## 2017-04-14 PROCEDURE — 87591 N.GONORRHOEAE DNA AMP PROB: CPT | Performed by: PHYSICIAN ASSISTANT

## 2017-04-17 LAB
C TRACH DNA SPEC QL NAA+PROBE: NORMAL
N GONORRHOEA DNA SPEC QL NAA+PROBE: NORMAL
SPECIMEN SOURCE: NORMAL
SPECIMEN SOURCE: NORMAL

## 2017-07-03 ENCOUNTER — TELEPHONE (OUTPATIENT)
Dept: FAMILY MEDICINE | Facility: CLINIC | Age: 22
End: 2017-07-03

## 2017-07-03 NOTE — TELEPHONE ENCOUNTER
Please call patient and recheck PHQ9 score per Nicolasa Russell staff message.     Bettye Williamson MA

## 2017-07-03 NOTE — TELEPHONE ENCOUNTER
"Called and spoke to Artis.    PHQ-9 SCORE 3/3/2017 4/10/2017 7/3/2017   Total Score 14 13 7     Improved. He feels like he is \"much better\" than when he was here in April. He will follow up in October before his last refill of Zoloft runs out.     Jia Montano RN    "

## 2017-07-04 ASSESSMENT — PATIENT HEALTH QUESTIONNAIRE - PHQ9: SUM OF ALL RESPONSES TO PHQ QUESTIONS 1-9: 7

## 2018-10-19 DIAGNOSIS — F33.1 MAJOR DEPRESSIVE DISORDER, RECURRENT EPISODE, MODERATE (H): ICD-10-CM

## 2018-10-19 DIAGNOSIS — F41.1 GAD (GENERALIZED ANXIETY DISORDER): ICD-10-CM

## 2018-10-19 RX ORDER — SERTRALINE HYDROCHLORIDE 100 MG/1
100 TABLET, FILM COATED ORAL DAILY
Qty: 90 TABLET | Refills: 1 | OUTPATIENT
Start: 2018-10-19

## 2018-10-19 RX ORDER — HYDROXYZINE HYDROCHLORIDE 25 MG/1
25-50 TABLET, FILM COATED ORAL EVERY 6 HOURS PRN
Qty: 60 TABLET | Refills: 5 | OUTPATIENT
Start: 2018-10-19

## 2018-10-19 NOTE — TELEPHONE ENCOUNTER
Patient hasn't been seen in 1.5 years and was told to f/u in 3-6 months. Should have ran out of Zoloft about 6 months ago. Denied.   Xiomara Bronson RN

## 2018-10-19 NOTE — TELEPHONE ENCOUNTER
"[facility] faxed PATIENT REQUESTS NEW RX:    PHARMACY COMMENTS:    PATIENT REQUESTS NEW RX:  PT LOOKING FOR SERTRALINE 100MG AND HYDROXYZINE 25MG BE SENT INTO [facility]    Requested Prescriptions   Pending Prescriptions Disp Refills     hydrOXYzine (ATARAX) 25 MG tablet  Last Written Prescription Date:  4/10/2017  Last Fill Quantity: 60 tablet,  # refills: 5   Last office visit: 4/10/2017 with prescribing provider:  NICKY Russell   Future Office Visit:     60 tablet 5     Sig: Take 1-2 tablets (25-50 mg) by mouth every 6 hours as needed for anxiety    Antihistamines Protocol Failed    10/19/2018  7:30 AM       Failed - Recent (12 mo) or future (30 days) visit within the authorizing provider's specialty    Patient had office visit in the last 12 months or has a visit in the next 30 days with authorizing provider or within the authorizing provider's specialty.  See \"Patient Info\" tab in inbasket, or \"Choose Columns\" in Meds & Orders section of the refill encounter.             Passed - Patient is age 3 or older    Apply age and/or weight-based dosing for peds patients age 3 and older.    Forward request to provider for patients under the age of 3.                sertraline (ZOLOFT) 100 MG tablet  Last Written Prescription Date:  4/10/2017  Last Fill Quantity: 90 tablet,  # refills: 1   Last office visit: 4/10/2017 with prescribing provider:  NICKY Russell   Future Office Visit:     90 tablet 1     Sig: Take 1 tablet (100 mg) by mouth daily    SSRIs Protocol Failed    10/19/2018  7:30 AM       Failed - PHQ-9 score less than 5 in past 6 months    Please review last PHQ-9 score.   PHQ-9 SCORE 3/3/2017 4/10/2017 7/3/2017   Total Score 14 13 7     KATY-7 SCORE 2/13/2017 3/3/2017 4/10/2017   Total Score 17 20 9          Failed - Recent (6 mo) or future (30 days) visit within the authorizing provider's specialty    Patient had office visit in the last 6 months or has a visit in the next 30 days with authorizing provider or within the " "authorizing provider's specialty.  See \"Patient Info\" tab in inbasket, or \"Choose Columns\" in Meds & Orders section of the refill encounter.           Passed - Patient is age 18 or older          "

## 2018-10-30 ENCOUNTER — OFFICE VISIT (OUTPATIENT)
Dept: FAMILY MEDICINE | Facility: CLINIC | Age: 23
End: 2018-10-30

## 2018-10-30 VITALS
BODY MASS INDEX: 25.61 KG/M2 | DIASTOLIC BLOOD PRESSURE: 80 MMHG | SYSTOLIC BLOOD PRESSURE: 138 MMHG | HEIGHT: 68 IN | TEMPERATURE: 98.2 F | WEIGHT: 169 LBS | HEART RATE: 92 BPM

## 2018-10-30 DIAGNOSIS — F41.1 GAD (GENERALIZED ANXIETY DISORDER): ICD-10-CM

## 2018-10-30 DIAGNOSIS — F33.1 MAJOR DEPRESSIVE DISORDER, RECURRENT EPISODE, MODERATE (H): Primary | ICD-10-CM

## 2018-10-30 PROCEDURE — 99214 OFFICE O/P EST MOD 30 MIN: CPT | Performed by: NURSE PRACTITIONER

## 2018-10-30 RX ORDER — SERTRALINE HYDROCHLORIDE 25 MG/1
TABLET, FILM COATED ORAL
Qty: 18 TABLET | Refills: 0 | Status: SHIPPED | OUTPATIENT
Start: 2018-10-30 | End: 2019-09-11

## 2018-10-30 RX ORDER — SERTRALINE HYDROCHLORIDE 100 MG/1
100 TABLET, FILM COATED ORAL DAILY
Qty: 90 TABLET | Refills: 3 | Status: SHIPPED | OUTPATIENT
Start: 2018-10-30 | End: 2020-10-12

## 2018-10-30 RX ORDER — HYDROXYZINE HYDROCHLORIDE 25 MG/1
25-50 TABLET, FILM COATED ORAL EVERY 6 HOURS PRN
Qty: 60 TABLET | Refills: 5 | Status: SHIPPED | OUTPATIENT
Start: 2018-10-30 | End: 2019-09-11

## 2018-10-30 ASSESSMENT — PATIENT HEALTH QUESTIONNAIRE - PHQ9
5. POOR APPETITE OR OVEREATING: SEVERAL DAYS
SUM OF ALL RESPONSES TO PHQ QUESTIONS 1-9: 13

## 2018-10-30 ASSESSMENT — ANXIETY QUESTIONNAIRES
5. BEING SO RESTLESS THAT IT IS HARD TO SIT STILL: NOT AT ALL
IF YOU CHECKED OFF ANY PROBLEMS ON THIS QUESTIONNAIRE, HOW DIFFICULT HAVE THESE PROBLEMS MADE IT FOR YOU TO DO YOUR WORK, TAKE CARE OF THINGS AT HOME, OR GET ALONG WITH OTHER PEOPLE: VERY DIFFICULT
GAD7 TOTAL SCORE: 9
2. NOT BEING ABLE TO STOP OR CONTROL WORRYING: SEVERAL DAYS
7. FEELING AFRAID AS IF SOMETHING AWFUL MIGHT HAPPEN: NOT AT ALL
1. FEELING NERVOUS, ANXIOUS, OR ON EDGE: MORE THAN HALF THE DAYS
6. BECOMING EASILY ANNOYED OR IRRITABLE: NEARLY EVERY DAY
3. WORRYING TOO MUCH ABOUT DIFFERENT THINGS: MORE THAN HALF THE DAYS

## 2018-10-30 NOTE — PROGRESS NOTES
SUBJECTIVE:   Artis Chou is a 23 year old male who presents to clinic today for the following health issues:      Depression and Anxiety Follow-Up    Status since last visit: Improved with meds     Other associated symptoms:None    Complicating factors:     Significant life event: No     Current substance abuse: marijuana     Says he's been off medication since July as he doesn't have insurance. Wants to go back on zoloft and hydroxyzine as it was quite helpful. Phq9 completed today and improved.     PHQ 4/10/2017 7/3/2017 10/30/2018   PHQ-9 Total Score 13 7 13   Q9: Suicide Ideation Not at all Not at all Not at all     KATY-7 SCORE 3/3/2017 4/10/2017 10/30/2018   Total Score 20 9 9       PHQ-9  English  PHQ-9   Any Language  KATY-7  Suicide Assessment Five-step Evaluation and Treatment (SAFE-T)    Amount of exercise or physical activity: None    Problems taking medications regularly: No    Medication side effects: none    Diet: regular (no restrictions)        Problem list and histories reviewed & adjusted, as indicated.  Additional history: as documented    Patient Active Problem List   Diagnosis     Peyronie disease     CARDIOVASCULAR SCREENING; LDL GOAL LESS THAN 160     Major depressive disorder, recurrent episode, moderate (H)     KATY (generalized anxiety disorder)     Past Surgical History:   Procedure Laterality Date     NO HISTORY OF SURGERY         Social History   Substance Use Topics     Smoking status: Never Smoker     Smokeless tobacco: Never Used     Alcohol use Yes      Comment: rarely     Family History   Problem Relation Age of Onset     Diabetes Mother      Depression Father      Cancer Maternal Grandmother      lung     HEART DISEASE Maternal Grandfather      Hypertension Paternal Grandmother      Other Cancer Paternal Grandmother      Hypertension Paternal Grandfather          Current Outpatient Prescriptions   Medication Sig Dispense Refill     hydrOXYzine (ATARAX) 25 MG tablet Take  "1-2 tablets (25-50 mg) by mouth every 6 hours as needed for anxiety 60 tablet 5     sertraline (ZOLOFT) 100 MG tablet Take 1 tablet (100 mg) by mouth daily 90 tablet 3     sertraline (ZOLOFT) 25 MG tablet Start 25 mg daily for a 3 days, then increase to 50 mg daily for 3 days, then increase to 75 mg daily for 3 days then increase to 100 mg daily. 18 tablet 0     [DISCONTINUED] sertraline (ZOLOFT) 100 MG tablet Take 1 tablet (100 mg) by mouth daily 90 tablet 1       Reviewed and updated as needed this visit by clinical staff  Tobacco  Allergies  Meds  Med Hx  Surg Hx  Fam Hx  Soc Hx      Reviewed and updated as needed this visit by Provider         ROS:  Constitutional, HEENT, cardiovascular, pulmonary, GI, , musculoskeletal, neuro, skin, endocrine and psych systems are negative, except as otherwise noted.    OBJECTIVE:     /80 (BP Location: Left arm, Patient Position: Chair, Cuff Size: Adult Large)  Pulse 92  Temp 98.2  F (36.8  C) (Oral)  Ht 5' 7.5\" (1.715 m)  Wt 169 lb (76.7 kg)  BMI 26.08 kg/m2  Body mass index is 26.08 kg/(m^2).  GENERAL: healthy, alert and no distress  NECK: no adenopathy, no asymmetry, masses, or scars and thyroid normal to palpation  RESP: lungs clear to auscultation - no rales, rhonchi or wheezes  CV: regular rate and rhythm, normal S1 S2, no S3 or S4, no murmur, click or rub, no peripheral edema and peripheral pulses strong  ABDOMEN: soft, nontender, no hepatosplenomegaly, no masses and bowel sounds normal  MS: no gross musculoskeletal defects noted, no edema  PSYCH: mentation appears normal, affect normal/bright    Diagnostic Test Results:  none     ASSESSMENT/PLAN:     1. Major depressive disorder, recurrent episode, moderate (H)  Stable but off meds d/t insurance and want to go back on them.   - sertraline (ZOLOFT) 25 MG tablet; Start 25 mg daily for a 3 days, then increase to 50 mg daily for 3 days, then increase to 75 mg daily for 3 days then increase to 100 mg " daily.  Dispense: 18 tablet; Refill: 0  - sertraline (ZOLOFT) 100 MG tablet; Take 1 tablet (100 mg) by mouth daily  Dispense: 90 tablet; Refill: 3    2. KATY (generalized anxiety disorder)    - hydrOXYzine (ATARAX) 25 MG tablet; Take 1-2 tablets (25-50 mg) by mouth every 6 hours as needed for anxiety  Dispense: 60 tablet; Refill: 5  - sertraline (ZOLOFT) 100 MG tablet; Take 1 tablet (100 mg) by mouth daily  Dispense: 90 tablet; Refill: 3    GIULIANO Castro [facility]

## 2018-10-30 NOTE — PATIENT INSTRUCTIONS
Start Zoloft titration    Start hydroxyzine 25 mg as needed     [facility]   Discharged by : Tavia Jerome CMA    If you have any questions regarding your visit please contact your care team:     [facility]                Clinic Hours Telephone Number     Dr. Chelle Kline, CNP  Abeba Almonteter, CNP 7am-7pm  Monday - Thursday   7am-5pm  Fridays  [phone]   (Appointment scheduling available 24/7)     RN Line  [phone] option 2     Urgent Care - [place] - 11am-9pm Monday-Friday Saturday-Sunday- 9am-5pm     [place] -   5pm-9pm Monday-Friday Saturday-Sunday- 9am-5pm    [phone] - [place]    [phone] - [place]       For a Price Quote for your services, please call our Consumer Price Line at [phone].     What options do I have for visits at the clinic other than the traditional office visit?     To expand how we care for you, many of our providers are utilizing electronic visits (e-visits) and telephone visits, when medically appropriate, for interactions with their patients rather than a visit in the clinic. We also offer nurse visits for many medical concerns. Just like any other service, we will bill your insurance company for this type of visit based on time spent on the phone with your provider. Not all insurance companies cover these visits. Please check with your medical insurance if this type of visit is covered. You will be responsible for any charges that are not paid by your insurance.   E-visits via ERLink: generally incur a $35.00 fee.     Telephone visits:  Time spent on the phone: *charged based on time that is spent on the phone in increments of 10 minutes. Estimated cost:   5-10 mins $30.00   11-20 mins. $59.00   21-30 mins. $85.00       Use ERLink (secure email communication and access to your chart) to send your primary care provider a message or make an  appointment. Ask someone on your Team how to sign up for [employer].     As always, Thank you for trusting us with your health care needs!      [facility]:    Scheduling Appointments  [facility]  Call: [phone]    [facility]  Call: [phone]    [facility]  Call: [phone]    For Gastroenterology referrals   [facility]   Clinics and Surgery [place], 4th Floor   [address] [place] [address]   Appointments: [phone]    WHERE TO GO FOR CARE?  Clinic    Make an appointment if you:       Are sick (cold, cough, flu, sore throat, earache or in pain).       Have a small injury (sprain, small cut, burn or broken bone).       Need a physical exam, Pap smear, vaccine or prescription refill.       Have questions about your health or medicines.    To reach us:      Call [phone] ([phone]). Open 24 hours every day. (For counseling services, call [phone].)    Log into [employer] at [email or web address]. (Visit [email or web address] to create an account.) Hospital emergency room    An emergency is a serious or life- threatening problem that must be treated right away.    Call 065 or get to the hospital if you have:      Very bad or sudden:            - Chest pain or pressure         - Bleeding         - Head or belly pain         - Dizziness or trouble seeing, walking or                          Speaking      Problems breathing      Blood in your vomit or you are coughing up blood      A major injury (knocked out, loss of a finger or limb, rape, broken bone protruding from skin)    A mental health crisis. (Or call the Mental Health Crisis line at 1-159.635.9079 or Suicide Prevention Hotline at 1-994.792.4347.)    Open 24 hours every day. You don't need an appointment.     Urgent care    Visit urgent care for sickness or small injuries when the clinic is closed. You don't need an  appointment. To check hours or find an urgent care near you, visit [email or web address]. Online care    Get [facility] from [facility] for more than 70 common problems, like colds, allergies and infections. Open 24 hours every day at:   [email or web address]   Need help deciding?    For advice about where to be seen, you may call your clinic and ask to speak with a nurse. We're here for you 24 hours every day.         If you are deaf or hard of hearing, please let us know. We provide many free services including sign language interpreters, oral interpreters, TTYs, telephone amplifiers, note takers and written materials.

## 2018-10-30 NOTE — MR AVS SNAPSHOT
After Visit Summary   10/30/2018    Artis Chou    MRN: [id number]           Patient Information     Date Of Birth          1995        Visit Information        Provider Department      10/30/2018 12:40 PM Abeba Ruiz APRN CNP [facility]        Today's Diagnoses     Major depressive disorder, recurrent episode, moderate (H)    -  1    KATY (generalized anxiety disorder)          Care Instructions    Start Zoloft titration    Start hydroxyzine 25 mg as needed     [facility]   Discharged by : Tavia Jerome CMA    If you have any questions regarding your visit please contact your care team:     Team Gold                Clinic Hours Telephone Number     Dr. Chelle Kline, LAW Ruiz CNP 7am-7pm  Monday - Thursday   7am-5pm  Fridays  [phone]   (Appointment scheduling available 24/7)     RN Line  [phone] option 2     Urgent Care - [place] [place] [facility] - 11am-9pm Monday-Friday Saturday-Sunday- 9am-5pm     [place] -   5pm-9pm Monday-Friday Saturday-Sunday- 9am-5pm    [phone] - [place]    [phone] - [place]       For a Price Quote for your services, please call our Consumer Price Line at [phone].     What options do I have for visits at the clinic other than the traditional office visit?     To expand how we care for you, many of our providers are utilizing electronic visits (e-visits) and telephone visits, when medically appropriate, for interactions with their patients rather than a visit in the clinic. We also offer nurse visits for many medical concerns. Just like any other service, we will bill your insurance company for this type of visit based on time spent on the phone with your provider. Not all insurance companies cover these visits. Please check with your medical insurance if this type of visit is  covered. You will be responsible for any charges that are not paid by your insurance.   E-visits via OxonicaharFeeX - [employer] of Fees: generally incur a $35.00 fee.     Telephone visits:  Time spent on the phone: *charged based on time that is spent on the phone in increments of 10 minutes. Estimated cost:   5-10 mins $30.00   11-20 mins. $59.00   21-30 mins. $85.00       Use Oxonicahart (secure email communication and access to your chart) to send your primary care provider a message or make an appointment. Ask someone on your Team how to sign up for Locately.     As always, Thank you for trusting us with your health care needs!      [facility]:    Scheduling Appointments  [place] [place]  Call: [phone]    [facility]  Call: [phone]    [facility]  Call: [phone]    For Gastroenterology referrals   [facility]   Clinics and Surgery Center, 4th Floor   [address] [place] [address]   Appointments: [phone]    WHERE TO GO FOR CARE?  Clinic    Make an appointment if you:       Are sick (cold, cough, flu, sore throat, earache or in pain).       Have a small injury (sprain, small cut, burn or broken bone).       Need a physical exam, Pap smear, vaccine or prescription refill.       Have questions about your health or medicines.    To reach us:      Call [phone] ([phone]). Open 24 hours every day. (For counseling services, call [phone].)    Log into Locately at [email or web address]. (Visit [email or web address] to create an account.) Hospital emergency room    An emergency is a serious or life- threatening problem that must be treated right away.    Call 694 or get to the hospital if you have:      Very bad or sudden:            - Chest pain or pressure         - Bleeding         - Head or belly pain         - Dizziness or trouble seeing, walking or                          Speaking      Problems  breathing      Blood in your vomit or you are coughing up blood      A major injury (knocked out, loss of a finger or limb, rape, broken bone protruding from skin)    A mental health crisis. (Or call the Mental Health Crisis line at 1-666.797.5989 or Suicide Prevention Hotline at 1-721.102.1536.)    Open 24 hours every day. You don't need an appointment.     Urgent care    Visit urgent care for sickness or small injuries when the clinic is closed. You don't need an appointment. To check hours or find an urgent care near you, visit [email or web address]. Online care    Get online care from [facility] for more than 70 common problems, like colds, allergies and infections. Open 24 hours every day at:   [email or web address]   Need help deciding?    For advice about where to be seen, you may call your clinic and ask to speak with a nurse. We're here for you 24 hours every day.         If you are deaf or hard of hearing, please let us know. We provide many free services including sign language interpreters, oral interpreters, TTYs, telephone amplifiers, note takers and written materials.                   Follow-ups after your visit        Who to contact     If you have questions or need follow up information about today's clinic visit or your schedule please contact [facility] directly at [phone].  Normal or non-critical lab and imaging results will be communicated to you by Sportforthart, letter or phone within 4 business days after the clinic has received the results. If you do not hear from us within 7 days, please contact the clinic through Sportforthart or phone. If you have a critical or abnormal lab result, we will notify you by phone as soon as possible.  Submit refill requests through Decision Pace or call your pharmacy and they will forward the refill request to us. Please allow 3 business days for your refill to be completed.          Additional Information About Your Visit        Decision Pace Information     Decision Pace gives  "you secure access to your electronic health record. If you see a primary care provider, you can also send messages to your care team and make appointments. If you have questions, please call your primary care clinic.  If you do not have a primary care provider, please call [phone] and they will assist you.        Care EveryWhere ID     This is your Care EveryWhere ID. This could be used by other organizations to access your [place] medical records  [id number]        Your Vitals Were     Pulse Temperature Height BMI (Body Mass Index)          92 98.2  F (36.8  C) (Oral) 5' 7.5\" (1.715 m) 26.08 kg/m2         Blood Pressure from Last 3 Encounters:   10/30/18 138/80   04/10/17 128/82   03/03/17 116/68    Weight from Last 3 Encounters:   10/30/18 169 lb (76.7 kg)   04/10/17 160 lb (72.6 kg)   03/03/17 165 lb (74.8 kg)              Today, you had the following     No orders found for display         Today's Medication Changes          These changes are accurate as of 10/30/18  1:24 PM.  If you have any questions, ask your nurse or doctor.               These medicines have changed or have updated prescriptions.        Dose/Directions    * sertraline 25 MG tablet   Commonly known as:  ZOLOFT   This may have changed:    - medication strength  - how much to take  - how to take this  - when to take this  - additional instructions   Used for:  Major depressive disorder, recurrent episode, moderate (H)   Changed by:  Abeba Ruiz APRN CNP        Start 25 mg daily for a 3 days, then increase to 50 mg daily for 3 days, then increase to 75 mg daily for 3 days then increase to 100 mg daily.   Quantity:  18 tablet   Refills:  0       * sertraline 100 MG tablet   Commonly known as:  ZOLOFT   This may have changed:  You were already taking a medication with the same name, and this prescription was added. Make sure you understand how and when to take each.   Used for:  Major depressive disorder, recurrent " episode, moderate (H), KATY (generalized anxiety disorder)   Changed by:  Abeba Ruiz APRN CNP        Dose:  100 mg   Take 1 tablet (100 mg) by mouth daily   Quantity:  90 tablet   Refills:  3       * Notice:  This list has 2 medication(s) that are the same as other medications prescribed for you. Read the directions carefully, and ask your doctor or other care provider to review them with you.         Where to get your medicines      These medications were sent to [facility] [address] [place] [address] Rd.  [address] [facility] [address]     Phone:  [phone]     hydrOXYzine 25 MG tablet    sertraline 100 MG tablet         Some of these will need a paper prescription and others can be bought over the counter.  Ask your nurse if you have questions.     Bring a paper prescription for each of these medications     sertraline 25 MG tablet                Primary Care Provider Office Phone # Fax #    [facility] [phone]       [address] [place] [address]        Equal Access to Services     OLLIE CÁRDENAS AH: Hadii flaquito ku hadasho Soomaali, waaxda luqadaha, qaybta kaalmada adeegyada, sophy hernández . So [facility] [phone].    ATENCIÓN: Si habla español, tiene a ga disposición servicios gratuitos de asistencia lingüística. Llame al [phone].    We comply with applicable federal civil rights laws and [place] laws. We do not discriminate on the basis of race, color, national origin, age, disability, sex, sexual orientation, or gender identity.            Thank you!     Thank you for choosing [facility]  for your care. Our goal is always to provide you with excellent care. Hearing back from our patients is one way we can continue to improve our services. Please take a few minutes to complete the written survey that you may receive in the mail after your visit  with us. Thank you!             Your Updated Medication List - Protect others around you: Learn how to safely use, store and throw away your medicines at www.disposemymeds.org.          This list is accurate as of 10/30/18  1:24 PM.  Always use your most recent med list.                   Brand Name Dispense Instructions for use Diagnosis    hydrOXYzine 25 MG tablet    ATARAX    60 tablet    Take 1-2 tablets (25-50 mg) by mouth every 6 hours as needed for anxiety    KATY (generalized anxiety disorder)       * sertraline 25 MG tablet    ZOLOFT    18 tablet    Start 25 mg daily for a 3 days, then increase to 50 mg daily for 3 days, then increase to 75 mg daily for 3 days then increase to 100 mg daily.    Major depressive disorder, recurrent episode, moderate (H)       * sertraline 100 MG tablet    ZOLOFT    90 tablet    Take 1 tablet (100 mg) by mouth daily    Major depressive disorder, recurrent episode, moderate (H), KATY (generalized anxiety disorder)       * Notice:  This list has 2 medication(s) that are the same as other medications prescribed for you. Read the directions carefully, and ask your doctor or other care provider to review them with you.

## 2018-10-31 ASSESSMENT — ANXIETY QUESTIONNAIRES: GAD7 TOTAL SCORE: 9

## 2019-04-16 ENCOUNTER — TELEPHONE (OUTPATIENT)
Dept: FAMILY MEDICINE | Facility: CLINIC | Age: 24
End: 2019-04-16

## 2019-04-16 NOTE — TELEPHONE ENCOUNTER
Panel Management Review      Patient has the following on his problem list:     Depression / Dysthymia review    Measure:  Needs PHQ-9 score of 4 or less during index window.  Administer PHQ-9 and if score is 5 or more, send encounter to provider for next steps.    5 - 7 month window range: 3/1/19-6/29/19    PHQ-9 SCORE 4/10/2017 7/3/2017 10/30/2018   PHQ-9 Total Score 13 7 13       If PHQ-9 recheck is 5 or more, route to provider for next steps.    Patient is due for:  PHQ9 and DAP      Composite cancer screening  Chart review shows that this patient is due/due soon for the following None  Summary:    Patient is due/failing the following:   DAP, PHQ9 and PHYSICAL    Action needed:   Patient needs office visit for preventative care. and Patient needs to do PHQ9.    Type of outreach:    Routed to care team for outreach    Questions for provider review:    None                                                                                                                                    Elzbieta Naylor,        Chart routed to Care Team .

## 2019-04-16 NOTE — LETTER
[address] [id number] [phone]                                                                                                April 24, 2019    Artis Chou  [address] [place] [address]        Dear Cammy Effie,    At [facility] we care about your health and well-being. A review of your chart has indicated that you are due for a 6 month mood check. Please contact us at [phone] to schedule your appointment.    You may contact the clinic at [phone] if you have any questions or concerns about this request.      Sincerely,      [place] Staff

## 2019-04-16 NOTE — TELEPHONE ENCOUNTER
Patient/family was instructed to return call to [facility] RN directly on the RN Call back line at [phone].  Will attempt to schedule patient for a visit since he is due.     Emelyn Solorzano RN

## 2019-04-24 NOTE — TELEPHONE ENCOUNTER
2nd attempt with no answer.  Left VM instructing him to schedule f/u with provider. Letter sent also and will close encounter.    Emelyn Solorzano RN

## 2019-09-11 ENCOUNTER — OFFICE VISIT (OUTPATIENT)
Dept: FAMILY MEDICINE | Facility: CLINIC | Age: 24
End: 2019-09-11
Payer: COMMERCIAL

## 2019-09-11 VITALS
HEART RATE: 61 BPM | HEIGHT: 68 IN | WEIGHT: 190 LBS | DIASTOLIC BLOOD PRESSURE: 74 MMHG | BODY MASS INDEX: 28.79 KG/M2 | SYSTOLIC BLOOD PRESSURE: 136 MMHG | TEMPERATURE: 98.2 F

## 2019-09-11 DIAGNOSIS — B86 SCABIES: Primary | ICD-10-CM

## 2019-09-11 DIAGNOSIS — L29.9 ITCHING: ICD-10-CM

## 2019-09-11 DIAGNOSIS — F41.1 GAD (GENERALIZED ANXIETY DISORDER): ICD-10-CM

## 2019-09-11 PROCEDURE — 99213 OFFICE O/P EST LOW 20 MIN: CPT | Performed by: FAMILY MEDICINE

## 2019-09-11 RX ORDER — HYDROXYZINE HYDROCHLORIDE 25 MG/1
25-50 TABLET, FILM COATED ORAL EVERY 6 HOURS PRN
Qty: 60 TABLET | Refills: 5 | Status: SHIPPED | OUTPATIENT
Start: 2019-09-11 | End: 2020-08-17

## 2019-09-11 RX ORDER — PERMETHRIN 50 MG/G
CREAM TOPICAL
Qty: 60 G | Refills: 1 | Status: SHIPPED | OUTPATIENT
Start: 2019-09-11 | End: 2020-08-17

## 2019-09-11 ASSESSMENT — PATIENT HEALTH QUESTIONNAIRE - PHQ9: SUM OF ALL RESPONSES TO PHQ QUESTIONS 1-9: 10

## 2019-09-11 ASSESSMENT — PAIN SCALES - GENERAL: PAINLEVEL: NO PAIN (0)

## 2019-09-11 ASSESSMENT — MIFFLIN-ST. JEOR: SCORE: 1808.39

## 2019-09-11 NOTE — PATIENT INSTRUCTIONS
Do the scabies type treatment, repeat in one week    Lotion as needed    Hydroxyzine as needed for itching    Follow up as needed based on symptoms

## 2019-09-11 NOTE — LETTER
My Depression Action Plan  Name: Artis Chou   Date of Birth 1995  Date: 9/11/2019    My doctor: Liz Dang Mayo   My clinic: [facility] [address] [id number] [phone]          GREEN    ZONE   Good Control    What it looks like:     Things are going generally well. You have normal up s and down s. You may even feel depressed from time to time, but bad moods usually last less than a day.   What you need to do:  1. Continue to care for yourself (see self care plan)  2. Check your depression survival kit and update it as needed  3. Follow your physician s recommendations including any medication.  4. Do not stop taking medication unless you consult with your physician first.           YELLOW         ZONE Getting Worse    What it looks like:     Depression is starting to interfere with your life.     It may be hard to get out of bed; you may be starting to isolate yourself from others.    Symptoms of depression are starting to last most all day and this has happened for several days.     You may have suicidal thoughts but they are not constant.   What you need to do:     1. Call your care team, your response to treatment will improve if you keep your care team informed of your progress. Yellow periods are signs an adjustment may need to be made.     2. Continue your self-care, even if you have to fake it!    3. Talk to someone in your support network    4. Open up your depression survival kit           RED    ZONE Medical Alert - Get Help    What it looks like:     Depression is seriously interfering with your life.     You may experience these or other symptoms: You can t get out of bed most days, can t work or engage in other necessary activities, you have trouble taking care of basic hygiene, or basic responsibilities, thoughts of suicide or death that will not go away, self-injurious behavior.     What  you need to do:  1. Call your care team and request a same-day appointment. If they are not available (weekends or after hours) call your local crisis line, emergency room or 911.            Depression Self Care Plan / Survival Kit    Self-Care for Depression  Here s the deal. Your body and mind are really not as separate as most people think.  What you do and think affects how you feel and how you feel influences what you do and think. This means if you do things that people who feel good do, it will help you feel better.  Sometimes this is all it takes.  There is also a place for medication and therapy depending on how severe your depression is, so be sure to consult with your medical provider and/ or Behavioral Health Consultant if your symptoms are worsening or not improving.     In order to better manage my stress, I will:    Exercise  Get some form of exercise, every day. This will help reduce pain and release endorphins, the  feel good  chemicals in your brain. This is almost as good as taking antidepressants!  This is not the same as joining a gym and then never going! (they count on that by the way ) It can be as simple as just going for a walk or doing some gardening, anything that will get you moving.      Hygiene   Maintain good hygiene (Get out of bed in the morning, Make your bed, Brush your teeth, Take a shower, and Get dressed like you were going to work, even if you are unemployed).  If your clothes don't fit try to get ones that do.    Diet  I will strive to eat foods that are good for me, drink plenty of water, and avoid excessive sugar, caffeine, alcohol, and other mood-altering substances.  Some foods that are helpful in depression are: complex carbohydrates, B vitamins, flaxseed, fish or fish oil, fresh fruits and vegetables.    Psychotherapy  I agree to participate in Individual Therapy (if recommended).    Medication  If prescribed medications, I agree to take them.  Missing doses can result  in serious side effects.  I understand that drinking alcohol, or other illicit drug use, may cause potential side effects.  I will not stop my medication abruptly without first discussing it with my provider.    Staying Connected With Others  I will stay in touch with my friends, family members, and my primary care provider/team.    Use your imagination  Be creative.  We all have a creative side; it doesn t matter if it s oil painting, sand castles, or mud pies! This will also kick up the endorphins.    Witness Beauty  (AKA stop and smell the roses) Take a look outside, even in mid-winter. Notice colors, textures. Watch the squirrels and birds.     Service to others  Be of service to others.  There is always someone else in need.  By helping others we can  get out of ourselves  and remember the really important things.  This also provides opportunities for practicing all the other parts of the program.    Humor  Laugh and be silly!  Adjust your TV habits for less news and crime-drama and more comedy.    Control your stress  Try breathing deep, massage therapy, biofeedback, and meditation. Find time to relax each day.     My support system    Clinic Contact:  Phone number:    Contact 1:  Phone number:    Contact 2:  Phone number:    [place]/:  Phone number:    Therapist:  Phone number:    Local crisis center:    Phone number:    Other community support:  Phone number:

## 2019-09-11 NOTE — PROGRESS NOTES
"Subjective     Artis Chou is a 24 year old male who presents to clinic today for the following health issues:    HPI   Itchy skin for the past couple weeks  none     in past a little itchy when anxious    This is different    Bad at night    Small bumps come and go     Using lotion and benadryl pills    Itching is bad    No change in food/ drink/ chemical exposure    No new plants / animals exposure    Some new people at work    [employer]    No new clothing    No change in soap/ detergent  / shampoo    No history of environental allergies    Lives with mom and a couple other people    No contacts that are itching    No swimming recently             Reviewed and updated as needed this visit by Provider         Review of Systems   ROS COMP: see above       Objective    /74 (BP Location: Right arm, Patient Position: Chair, Cuff Size: Adult Regular)   Pulse 61   Temp 98.2  F (36.8  C) (Oral)   Ht 1.715 m (5' 7.5\")   Wt 86.2 kg (190 lb)   BMI 29.32 kg/m    Body mass index is 29.32 kg/m .  Physical Exam   Full physical not done     Mentation and affect are fine    No tremor of speech or extremity    Skin is not unusually dry    He only has a few very tiny scattered residual red bumps    nontender     No cellullitis    No evidence of bad excoriation        Diagnostic Test Results:  Labs reviewed in Epic         ASSESSMENT / PLAN:  (B86) Scabies  (primary encounter diagnosis)  Comment: may be case of mild nodular type scabies.  Discussed in detail.   Plan: permethrin (ELIMITE) 5 % external cream        Will treat.  Repeat in a week.          (F41.1) KATY (generalized anxiety disorder)  Comment: patient needs refill of this and I explained this can also be used as needed for itching   Plan: hydrOXYzine (ATARAX) 25 MG tablet           Itching: hydroxyzine prn.  Lotion.  Try not to scratch.    If symptoms not resolving then call/ return to clinic       I reviewed the patient's medications, " allergies, medical history, family history, and social history.    Kevin Rainey MD

## 2019-10-22 ENCOUNTER — TELEPHONE (OUTPATIENT)
Dept: FAMILY MEDICINE | Facility: CLINIC | Age: 24
End: 2019-10-22

## 2019-10-22 NOTE — TELEPHONE ENCOUNTER
Panel Management Review      Patient has the following on his problem list:     Depression / Dysthymia review    Measure:  Needs PHQ-9 score of 4 or less during index window.  Administer PHQ-9 and if score is 5 or more, send encounter to provider for next steps.    5 - 7 month window range: 08/31/2019-12/29/2019    PHQ-9 SCORE 7/3/2017 10/30/2018 9/11/2019   PHQ-9 Total Score 7 13 10       If PHQ-9 recheck is 5 or more, route to provider for next steps.    Patient is due for:  PHQ9      Composite cancer screening  Chart review shows that this patient is due/due soon for the following None  Summary:    Patient is due/failing the following:   PHQ9    Action needed:   Patient needs to do PHQ9.    Type of outreach:    Sent WyzeTalk message. 10/22/2019    Questions for provider review:    None                                                                                                                                    Tea Rushing [facility] [address] [facility]       Chart routed to Care Team .

## 2019-10-22 NOTE — LETTER
December 24, 2019    Artis Chou  [address] [place] [address]      Dear Artis Chou,     We have tried to contact you about your health, but have been unable to reach you.  Please call us as soon as possible so we can provide you with the best care possible.  We will continue to check in with you throughout the year to complete these items of care, if you are not able to complete these items at this time.  If you would like to complete the missing items for your care, please contact us at [phone].    We recommend the following:  -schedule a FOLLOWUP OFFICE APPOINTMENT with your provider.           Sincerely,     Your Care Team at [place]

## 2019-12-16 NOTE — TELEPHONE ENCOUNTER
Panel Management Review  Summary:    Type of outreach:    Sent [employer] message. questions sent to recheck score again.    Encounter routed to Care Team.                                                                                                                               Tea Rushing CMA

## 2019-12-24 NOTE — TELEPHONE ENCOUNTER
Panel Management Review  Summary:    Type of outreach:    patient has not recieved my chart message and it is too late to mail out questions to complete seeing as the index end date is 12/29/2019. Final letter mailed    Encounter routed to No Action Needed.                                                                                                                               Tea Rushing CMA

## 2020-02-23 ENCOUNTER — HEALTH MAINTENANCE LETTER (OUTPATIENT)
Age: 25
End: 2020-02-23

## 2020-08-17 ENCOUNTER — VIRTUAL VISIT (OUTPATIENT)
Dept: FAMILY MEDICINE | Facility: CLINIC | Age: 25
End: 2020-08-17
Payer: COMMERCIAL

## 2020-08-17 DIAGNOSIS — R41.840 ATTENTION AND CONCENTRATION DEFICIT: ICD-10-CM

## 2020-08-17 DIAGNOSIS — F33.1 MAJOR DEPRESSIVE DISORDER, RECURRENT EPISODE, MODERATE (H): Primary | ICD-10-CM

## 2020-08-17 DIAGNOSIS — F41.1 GAD (GENERALIZED ANXIETY DISORDER): ICD-10-CM

## 2020-08-17 PROCEDURE — 99214 OFFICE O/P EST MOD 30 MIN: CPT | Mod: 95 | Performed by: PHYSICIAN ASSISTANT

## 2020-08-17 RX ORDER — HYDROXYZINE HYDROCHLORIDE 25 MG/1
25-50 TABLET, FILM COATED ORAL EVERY 6 HOURS PRN
Qty: 60 TABLET | Refills: 5 | Status: SHIPPED | OUTPATIENT
Start: 2020-08-17 | End: 2021-07-07

## 2020-08-17 RX ORDER — SERTRALINE HYDROCHLORIDE 100 MG/1
TABLET, FILM COATED ORAL
Qty: 30 TABLET | Refills: 1 | Status: SHIPPED | OUTPATIENT
Start: 2020-08-17 | End: 2020-09-11

## 2020-08-17 ASSESSMENT — ANXIETY QUESTIONNAIRES
GAD7 TOTAL SCORE: 9
3. WORRYING TOO MUCH ABOUT DIFFERENT THINGS: MORE THAN HALF THE DAYS
5. BEING SO RESTLESS THAT IT IS HARD TO SIT STILL: SEVERAL DAYS
IF YOU CHECKED OFF ANY PROBLEMS ON THIS QUESTIONNAIRE, HOW DIFFICULT HAVE THESE PROBLEMS MADE IT FOR YOU TO DO YOUR WORK, TAKE CARE OF THINGS AT HOME, OR GET ALONG WITH OTHER PEOPLE: EXTREMELY DIFFICULT
7. FEELING AFRAID AS IF SOMETHING AWFUL MIGHT HAPPEN: SEVERAL DAYS
2. NOT BEING ABLE TO STOP OR CONTROL WORRYING: SEVERAL DAYS
6. BECOMING EASILY ANNOYED OR IRRITABLE: SEVERAL DAYS
1. FEELING NERVOUS, ANXIOUS, OR ON EDGE: SEVERAL DAYS

## 2020-08-17 ASSESSMENT — PATIENT HEALTH QUESTIONNAIRE - PHQ9
SUM OF ALL RESPONSES TO PHQ QUESTIONS 1-9: 14
5. POOR APPETITE OR OVEREATING: MORE THAN HALF THE DAYS

## 2020-08-17 NOTE — PROGRESS NOTES
"Artis Chou is a 25 year old male who is being evaluated via a billable telephone visit.      The patient has been notified of following:     \"This telephone visit will be conducted via a call between you and your physician/provider. We have found that certain health care needs can be provided without the need for a physical exam.  This service lets us provide the care you need with a short phone conversation.  If a prescription is necessary we can send it directly to your pharmacy.  If lab work is needed we can place an order for that and you can then stop by our lab to have the test done at a later time.    Telephone visits are billed at different rates depending on your insurance coverage. During this emergency period, for some insurers they may be billed the same as an in-person visit.  Please reach out to your insurance provider with any questions.    If during the course of the call the physician/provider feels a telephone visit is not appropriate, you will not be charged for this service.\"    Patient has given verbal consent for Telephone visit?  Yes    What phone number would you like to be contacted at? [phone]     How would you like to obtain your AVS? Sharmainehart    Subjective     Artis Chou is a 25 year old male who presents via phone visit today for the following health issues:    HPI    Depression and Anxiety [id number]-Zo-nan out his meds in 8 months ago     How are you doing with your depression since your last visit? No change    How are you doing with your anxiety since your last visit?  Worsened     Are you having other symptoms that might be associated with depression or anxiety? No    Have you had a significant life event? OTHER: covid,riots      Do you have any concerns with your use of alcohol or other drugs? No     Lost insurance for some time.      When on Zoloft and hydroxyzine was helping.      Smoking marijuana regularly.      No interest in seeing a therapist at this " time.      Also curious about add.  Never diagnosed.  Has struggled in school.  Feels he always has lots of thoughts racing troughts.         Social History     Tobacco Use     Smoking status: Never Smoker     Smokeless tobacco: Never Used   Substance Use Topics     Alcohol use: Yes     Comment: rarely     Drug use: Yes     Types: Marijuana     Comment: carol     PHQ 7/3/2017 10/30/2018 9/11/2019   PHQ-9 Total Score 7 13 10   Q9: Thoughts of better off dead/self-harm past 2 weeks Not at all Not at all Not at all     KATY-7 SCORE 3/3/2017 4/10/2017 10/30/2018   Total Score 20 9 9     Last PHQ-9 9/11/2019   1.  Little interest or pleasure in doing things 2   2.  Feeling down, depressed, or hopeless 0   3.  Trouble falling or staying asleep, or sleeping too much 3   4.  Feeling tired or having little energy 3   5.  Poor appetite or overeating 1   6.  Feeling bad about yourself 1   7.  Trouble concentrating 0   8.  Moving slowly or restless 0   Q9: Thoughts of better off dead/self-harm past 2 weeks 0   PHQ-9 Total Score 10   Difficulty at work, home, or with people Somewhat difficult     KTAY-7  10/30/2018   1. Feeling nervous, anxious, or on edge 2   2. Not being able to stop or control worrying 1   3. Worrying too much about different things 2   4. Trouble relaxing 1   5. Being so restless that it is hard to sit still 0   6. Becoming easily annoyed or irritable 3   7. Feeling afraid, as if something awful might happen 0   KATY-7 Total Score 9   If you checked any problems, how difficult have they made it for you to do your work, take care of things at home, or get along with other people? Very difficult       Suicide Assessment Five-step Evaluation and Treatment (SAFE-T)      How many servings of fruits and vegetables do you eat daily?  1-2     On average, how many sweetened beverages do you drink each day (Examples: soda, juice, sweet tea, etc.  Do NOT count diet or artificially sweetened beverages)?   3-4     How  many days per week do you exercise enough to make your heart beat faster? none    How many minutes a day do you exercise enough to make your heart beat faster? None     How many days per week do you miss taking your medication? 0      ADHD discussion              Patient Active Problem List   Diagnosis     Peyronie disease     CARDIOVASCULAR SCREENING; LDL GOAL LESS THAN 160     Major depressive disorder, recurrent episode, moderate (H)     KATY (generalized anxiety disorder)     Past Surgical History:   Procedure Laterality Date     NO HISTORY OF SURGERY         Social History     Tobacco Use     Smoking status: Never Smoker     Smokeless tobacco: Never Used   Substance Use Topics     Alcohol use: Yes     Comment: rarely     Family History   Problem Relation Age of Onset     Diabetes Mother      Depression Father      Cancer Maternal Grandmother         lung     Heart Disease Maternal Grandfather      Hypertension Paternal Grandmother      Other Cancer Paternal Grandmother      Hypertension Paternal Grandfather            Reviewed and updated as needed this visit by Provider         Review of Systems   As above       Objective          Vitals:  No vitals were obtained today due to virtual visit.    healthy, alert and no distress  PSYCH: Alert and oriented times 3; coherent speech, normal   rate and volume, able to articulate logical thoughts, able   to abstract reason, no tangential thoughts, no hallucinations   or delusions  His affect is normal  RESP: No cough, no audible wheezing, able to talk in full sentences  Remainder of exam unable to be completed due to telephone visits    Diagnostic Test Results:  none         Assessment/Plan:    Assessment & Plan     1. KATY (generalized anxiety disorder)  Restart medications  - sertraline (ZOLOFT) 100 MG tablet; Take 1/2 for a week then one daily  Dispense: 30 tablet; Refill: 1  - hydrOXYzine (ATARAX) 25 MG tablet; Take 1-2 tablets (25-50 mg) by mouth every 6 hours as  "needed for anxiety  Dispense: 60 tablet; Refill: 5    2. Major depressive disorder, recurrent episode, moderate (H)  As above  - sertraline (ZOLOFT) 100 MG tablet; Take 1/2 for a week then one daily  Dispense: 30 tablet; Refill: 1    3. Attention and concentration deficit  Get testing but does sound like ADHD.  Follow up when testing is done   - MENTAL HEALTH REFERRAL  - Adult; Assessments and Testing; ADHD; [facility]: [facility] [phone]; We will contact you to schedule the appointment or please call with any questions     BMI:   Estimated body mass index is 29.32 kg/m  as calculated from the following:    Height as of 9/11/19: 1.715 m (5' 7.5\").    Weight as of 9/11/19: 86.2 kg (190 lb).               Return in about 3 weeks (around 9/7/2020) for mood.    Elen Box PA-C  [facility]    Phone call duration:  11:29-11:37   8 minutes                "

## 2020-08-18 ASSESSMENT — ANXIETY QUESTIONNAIRES: GAD7 TOTAL SCORE: 9

## 2020-09-29 ENCOUNTER — TELEPHONE (OUTPATIENT)
Dept: FAMILY MEDICINE | Facility: CLINIC | Age: 25
End: 2020-09-29

## 2020-09-29 DIAGNOSIS — F41.1 GAD (GENERALIZED ANXIETY DISORDER): ICD-10-CM

## 2020-09-29 DIAGNOSIS — F33.1 MAJOR DEPRESSIVE DISORDER, RECURRENT EPISODE, MODERATE (H): ICD-10-CM

## 2020-09-29 NOTE — TELEPHONE ENCOUNTER
I see zoloft 30 tabs with one refill was sent by Elen Box PA-C 9/11/20.    Patient had virtual visit 8/17/20.    PHQ-9 score:    PHQ 8/17/2020   PHQ-9 Total Score 14   Q9: Thoughts of better off dead/self-harm past 2 weeks Not at all     He was advised follow up around 9/7/20    I see zoloft was new med, is he doing better?    Attempted to call patient at home/mobile number, left message on voicemail; patient was instructed to return call to [facility] RN directly on the RN call back line at [phone]     Plan to discuss, do phone PHQ9 and route to provider to consider 90 day refill.    Karol Choudhury, ROSHAN  [facility]

## 2020-09-29 NOTE — TELEPHONE ENCOUNTER
Attempt # 1  Called patient at home number.[phone]  Was call answered?  No answer, left message to call nurse line at [phone] also informed of PHQ-9 sent through my chart and asked in my chart message to schedule follow up appointment.               PHQ 10/30/2018 9/11/2019 8/17/2020   PHQ-9 Total Score 13 10 14   Q9: Thoughts of better off dead/self-harm past 2 weeks Not at all Not at all Not at all           Carolyn Gonzales RN  [facility]

## 2020-09-30 RX ORDER — SERTRALINE HYDROCHLORIDE 100 MG/1
100 TABLET, FILM COATED ORAL DAILY
Qty: 90 TABLET | Refills: 0 | Status: SHIPPED | OUTPATIENT
Start: 2020-09-30 | End: 2020-10-12

## 2020-09-30 NOTE — TELEPHONE ENCOUNTER
Nurse sees virtual visit 8/17/2020 Return in about 3 weeks (around 9/7/2020) for mood.  Explained needs a follow up appointment.    Scheduled telephone appointment with provider.  Routing to provider to okay medication request?      Carolyn Gonzales RN  Triage Nurse  [facility]  Appointment line: [phone]  [facility], 24 hour nurse line, available by calling clinic at [phone] and following prompts.

## 2020-10-12 ENCOUNTER — VIRTUAL VISIT (OUTPATIENT)
Dept: FAMILY MEDICINE | Facility: CLINIC | Age: 25
End: 2020-10-12
Payer: COMMERCIAL

## 2020-10-12 DIAGNOSIS — F33.1 MAJOR DEPRESSIVE DISORDER, RECURRENT EPISODE, MODERATE (H): ICD-10-CM

## 2020-10-12 DIAGNOSIS — F41.1 GAD (GENERALIZED ANXIETY DISORDER): ICD-10-CM

## 2020-10-12 PROCEDURE — 99214 OFFICE O/P EST MOD 30 MIN: CPT | Mod: 95 | Performed by: PHYSICIAN ASSISTANT

## 2020-10-12 RX ORDER — SERTRALINE HYDROCHLORIDE 100 MG/1
150 TABLET, FILM COATED ORAL DAILY
Qty: 90 TABLET | Refills: 0 | COMMUNITY
Start: 2020-10-12 | End: 2021-01-04

## 2020-10-12 ASSESSMENT — ANXIETY QUESTIONNAIRES
1. FEELING NERVOUS, ANXIOUS, OR ON EDGE: SEVERAL DAYS
5. BEING SO RESTLESS THAT IT IS HARD TO SIT STILL: NEARLY EVERY DAY
7. FEELING AFRAID AS IF SOMETHING AWFUL MIGHT HAPPEN: MORE THAN HALF THE DAYS
6. BECOMING EASILY ANNOYED OR IRRITABLE: MORE THAN HALF THE DAYS
IF YOU CHECKED OFF ANY PROBLEMS ON THIS QUESTIONNAIRE, HOW DIFFICULT HAVE THESE PROBLEMS MADE IT FOR YOU TO DO YOUR WORK, TAKE CARE OF THINGS AT HOME, OR GET ALONG WITH OTHER PEOPLE: SOMEWHAT DIFFICULT
GAD7 TOTAL SCORE: 15
2. NOT BEING ABLE TO STOP OR CONTROL WORRYING: MORE THAN HALF THE DAYS
3. WORRYING TOO MUCH ABOUT DIFFERENT THINGS: NEARLY EVERY DAY

## 2020-10-12 ASSESSMENT — PATIENT HEALTH QUESTIONNAIRE - PHQ9
5. POOR APPETITE OR OVEREATING: MORE THAN HALF THE DAYS
SUM OF ALL RESPONSES TO PHQ QUESTIONS 1-9: 9

## 2020-10-12 NOTE — PROGRESS NOTES
"Artis Chou is a 25 year old male who is being evaluated via a billable telephone visit.      The patient has been notified of following:     \"This telephone visit will be conducted via a call between you and your physician/provider. We have found that certain health care needs can be provided without the need for a physical exam.  This service lets us provide the care you need with a short phone conversation.  If a prescription is necessary we can send it directly to your pharmacy.  If lab work is needed we can place an order for that and you can then stop by our lab to have the test done at a later time.    Telephone visits are billed at different rates depending on your insurance coverage. During this emergency period, for some insurers they may be billed the same as an in-person visit.  Please reach out to your insurance provider with any questions.    If during the course of the call the physician/provider feels a telephone visit is not appropriate, you will not be charged for this service.\"    Patient has given verbal consent for Telephone visit?  Yes    What phone number would you like to be contacted at? [phone]     How would you like to obtain your AVS? Michaelle Lopez     Artis Chou is a 25 year old male who presents via phone visit today for the following health issues:    HPI     Depression and Anxiety Follow-Up    How are you doing with your depression since your last visit? No change    How are you doing with your anxiety since your last visit?  Improved     Are you having other symptoms that might be associated with depression or anxiety? No    Have you had a significant life event? OTHER: grandfather       Do you have any concerns with your use of alcohol or other drugs? No  Does feel like a higher dose would be better.  Has heard from the counseling service for ADHD testing but hasn't had an appointment yet.  No side effects from the medications  Does think " hydroxyzine is helping.  Is using.    Social History     Tobacco Use     Smoking status: Never Smoker     Smokeless tobacco: Never Used   Substance Use Topics     Alcohol use: Yes     Comment: rarely     Drug use: Yes     Types: Marijuana     Comment: carol     PHQ 10/30/2018 9/11/2019 8/17/2020   PHQ-9 Total Score 13 10 14   Q9: Thoughts of better off dead/self-harm past 2 weeks Not at all Not at all Not at all     KATY-7 SCORE 4/10/2017 10/30/2018 8/17/2020   Total Score 9 9 9     Last PHQ-9 8/17/2020   1.  Little interest or pleasure in doing things 2   2.  Feeling down, depressed, or hopeless 1   3.  Trouble falling or staying asleep, or sleeping too much 3   4.  Feeling tired or having little energy 3   5.  Poor appetite or overeating 1   6.  Feeling bad about yourself 1   7.  Trouble concentrating 3   8.  Moving slowly or restless 0   Q9: Thoughts of better off dead/self-harm past 2 weeks 0   PHQ-9 Total Score 14   Difficulty at work, home, or with people Very difficult     KATY-7  8/17/2020   1. Feeling nervous, anxious, or on edge 1   2. Not being able to stop or control worrying 1   3. Worrying too much about different things 2   4. Trouble relaxing 2   5. Being so restless that it is hard to sit still 1   6. Becoming easily annoyed or irritable 1   7. Feeling afraid, as if something awful might happen 1   KATY-7 Total Score 9   If you checked any problems, how difficult have they made it for you to do your work, take care of things at home, or get along with other people? Extremely difficult       Suicide Assessment Five-step Evaluation and Treatment (SAFE-T)      How many servings of fruits and vegetables do you eat daily? 1-2     On average, how many sweetened beverages do you drink each day (Examples: soda, juice, sweet tea, etc.  Do NOT count diet or artificially sweetened beverages)?   2-3     How many days per week do you exercise enough to make your heart beat faster? 3 or less    How many minutes a  day do you exercise enough to make your heart beat faster? 20 - 29    How many days per week do you miss taking your medication? 0            Review of Systems   As above       Objective          Vitals:  No vitals were obtained today due to virtual visit.    healthy, alert and no distress  PSYCH: Alert and oriented times 3; coherent speech, normal   rate and volume, able to articulate logical thoughts, able   to abstract reason, no tangential thoughts, no hallucinations   or delusions  His affect is normal  RESP: No cough, no audible wheezing, able to talk in full sentences  Remainder of exam unable to be completed due to telephone visits            Assessment/Plan:    Assessment & Plan     KATY (generalized anxiety disorder)  Pt feels better but wants to increase dose.  Will incrase to 150mg.    - sertraline (ZOLOFT) 100 MG tablet; Take 1.5 tablets (150 mg) by mouth daily    Major depressive disorder, recurrent episode, moderate (H)  As above  - sertraline (ZOLOFT) 100 MG tablet; Take 1.5 tablets (150 mg) by mouth daily            Return in about 4 weeks (around 11/9/2020) for mood.    Elen Box PA-C  [facility]    Phone call duration:  8-8:06   6minutes

## 2020-10-13 ASSESSMENT — ANXIETY QUESTIONNAIRES: GAD7 TOTAL SCORE: 15

## 2020-10-21 ENCOUNTER — OFFICE VISIT (OUTPATIENT)
Dept: FAMILY MEDICINE | Facility: CLINIC | Age: 25
End: 2020-10-21
Payer: COMMERCIAL

## 2020-10-21 VITALS
WEIGHT: 174 LBS | HEART RATE: 89 BPM | OXYGEN SATURATION: 98 % | BODY MASS INDEX: 26.85 KG/M2 | DIASTOLIC BLOOD PRESSURE: 75 MMHG | TEMPERATURE: 97.3 F | SYSTOLIC BLOOD PRESSURE: 133 MMHG

## 2020-10-21 DIAGNOSIS — Z23 NEED FOR TD VACCINE: ICD-10-CM

## 2020-10-21 DIAGNOSIS — K62.89 RECTAL PAIN: Primary | ICD-10-CM

## 2020-10-21 PROCEDURE — 90714 TD VACC NO PRESV 7 YRS+ IM: CPT | Performed by: PHYSICIAN ASSISTANT

## 2020-10-21 PROCEDURE — 90471 IMMUNIZATION ADMIN: CPT | Performed by: PHYSICIAN ASSISTANT

## 2020-10-21 PROCEDURE — 99213 OFFICE O/P EST LOW 20 MIN: CPT | Mod: 25 | Performed by: PHYSICIAN ASSISTANT

## 2020-10-21 RX ORDER — NITROGLYCERIN 20 MG/G
1 OINTMENT TOPICAL EVERY 24 HOURS
Qty: 75 MG | Refills: 0 | Status: SHIPPED | OUTPATIENT
Start: 2020-10-21 | End: 2020-12-14

## 2020-10-21 NOTE — PROGRESS NOTES
Subjective     Artis Chou is a 25 year old male who presents to clinic today for the following health issues:    HPI         Rectal pain and mild itching for 5 days,seen blood on toilet paper( small amount) Normal daily bowel movement.Using preparation H cream-some relief   Hasn't had pain before but has had itching in past.  Pain started after having a bm.  Not constipated.  No trauma.  Has had itching and irritation in rectal area for about 10 years.              Review of Systems   As above      Objective    /75   Pulse 89   Temp 97.3  F (36.3  C) (Tympanic)   Wt 78.9 kg (174 lb)   SpO2 98%   BMI 26.85 kg/m    Body mass index is 26.85 kg/m .  Physical Exam  Constitutional:       General: He is not in acute distress.  Genitourinary:     Rectum: Normal.   Neurological:      Mental Status: He is alert.                    Assessment & Plan     Rectal pain  Likely hemorrhoids but none noted on external exam.  Start with ointment for pain but given pain, long history of itching see colorectal specialist.      - nitroGLYcerin (NITRO-BID) 2 % OINT ointment; Place 1 inch (15 mg) onto the skin every 24 hours for 5 days  - COLORECTAL SURGERY REFERRAL    Need for Td vaccine    - TD PRESERV FREE, IM (7+ YRS)  - INITIAL VACCINE ADMINSTRATION            No follow-ups on file.    NKECHI Kaur [facility]

## 2020-10-21 NOTE — NURSING NOTE
Prior to immunization administration, verified patients identity using patient s name and date of birth. Please see Immunization Activity for additional information.     Screening Questionnaire for Adult Immunization    Are you sick today?   No   Do you have allergies to medications, food, a vaccine component or latex?   No   Have you ever had a serious reaction after receiving a vaccination?   No   Do you have a long-term health problem with heart, lung, kidney, or metabolic disease (e.g., diabetes), asthma, a blood disorder, no spleen, complement component deficiency, a cochlear implant, or a spinal fluid leak?  Are you on long-term aspirin therapy?   No   Do you have cancer, leukemia, HIV/AIDS, or any other immune system problem?   No   Do you have a parent, brother, or sister with an immune system problem?   No   In the past 3 months, have you taken medications that affect  your immune system, such as prednisone, other steroids, or anticancer drugs; drugs for the treatment of rheumatoid arthritis, Crohn s disease, or psoriasis; or have you had radiation treatments?   No   Have you had a seizure, or a brain or other nervous system problem?   No   During the past year, have you received a transfusion of blood or blood    products, or been given immune (gamma) globulin or antiviral drug?   No   For women: Are you pregnant or is there a chance you could become       pregnant during the next month?   No   Have you received any vaccinations in the past 4 weeks?   No     Immunization questionnaire answers were all negative.        Per orders of Mare Box , injection of Td  given by Portia Yo CMA. Patient instructed to remain in clinic for 15 minutes afterwards, and to report any adverse reaction to me immediately.       Screening performed by Portia Yo CMA on 10/21/2020 at 12:22 PM.

## 2020-11-02 ENCOUNTER — VIRTUAL VISIT (OUTPATIENT)
Dept: PSYCHOLOGY | Facility: CLINIC | Age: 25
End: 2020-11-02
Payer: COMMERCIAL

## 2020-11-02 DIAGNOSIS — Z03.89 NO DIAGNOSIS ON AXIS I: Primary | ICD-10-CM

## 2020-12-02 ENCOUNTER — VIRTUAL VISIT (OUTPATIENT)
Dept: PSYCHOLOGY | Facility: CLINIC | Age: 25
End: 2020-12-02
Payer: COMMERCIAL

## 2020-12-02 DIAGNOSIS — F41.1 GENERALIZED ANXIETY DISORDER: ICD-10-CM

## 2020-12-02 DIAGNOSIS — F33.1 MAJOR DEPRESSIVE DISORDER, RECURRENT EPISODE, MODERATE (H): ICD-10-CM

## 2020-12-02 DIAGNOSIS — F88 DEVELOPMENTAL MENTAL DISORDER: Primary | ICD-10-CM

## 2020-12-02 PROCEDURE — 90791 PSYCH DIAGNOSTIC EVALUATION: CPT | Mod: 95 | Performed by: PSYCHOLOGIST

## 2020-12-02 ASSESSMENT — COLUMBIA-SUICIDE SEVERITY RATING SCALE - C-SSRS
1. IN YOUR LIFETIME, HAVE YOU WISHED YOU WERE DEAD OR WISHED YOU COULD GO TO SLEEP AND NOT WAKE UP?: 2-3 YEARS AGO
1. IN THE PAST MONTH, HAVE YOU WISHED YOU WERE DEAD OR WISHED YOU COULD GO TO SLEEP AND NOT WAKE UP?: YES
2. HAVE YOU ACTUALLY HAD ANY THOUGHTS OF KILLING YOURSELF LIFETIME?: YES
1. IN THE PAST MONTH, HAVE YOU WISHED YOU WERE DEAD OR WISHED YOU COULD GO TO SLEEP AND NOT WAKE UP?: NO
2. HAVE YOU ACTUALLY HAD ANY THOUGHTS OF KILLING YOURSELF?: NO
2. HAVE YOU ACTUALLY HAD ANY THOUGHTS OF KILLING YOURSELF?: 2-3 YEARS AGO

## 2020-12-02 ASSESSMENT — ANXIETY QUESTIONNAIRES
1. FEELING NERVOUS, ANXIOUS, OR ON EDGE: MORE THAN HALF THE DAYS
3. WORRYING TOO MUCH ABOUT DIFFERENT THINGS: MORE THAN HALF THE DAYS
6. BECOMING EASILY ANNOYED OR IRRITABLE: MORE THAN HALF THE DAYS
7. FEELING AFRAID AS IF SOMETHING AWFUL MIGHT HAPPEN: SEVERAL DAYS
5. BEING SO RESTLESS THAT IT IS HARD TO SIT STILL: NEARLY EVERY DAY
2. NOT BEING ABLE TO STOP OR CONTROL WORRYING: SEVERAL DAYS
GAD7 TOTAL SCORE: 13

## 2020-12-02 ASSESSMENT — PATIENT HEALTH QUESTIONNAIRE - PHQ9
SUM OF ALL RESPONSES TO PHQ QUESTIONS 1-9: 18
5. POOR APPETITE OR OVEREATING: MORE THAN HALF THE DAYS

## 2020-12-02 NOTE — PROGRESS NOTES
"                                                                                         Adult Intake Structured Interview      CLIENT'S NAME: Artis Chou  MRN:   [id number]  :   1995   ACCT. NUMBER: [id number]  DATE OF SERVICE: 20    As the provider I attest to compliance with applicable laws and regulations related to telemedicine.    The patient has been notified of the following:      \"We have found that certain health care needs can be provided without the need for a face to face visit. This service lets us provide the care you need with a phone conversation.       I will have full access to your [facility] medical record during this entire phone call. I will be taking notes for your medical record.      Since this is like an office visit, we will bill your insurance company for this service.       There are potential benefits and risks of telephone visits (e.g. limits to patient confidentiality) that differ from in-person visits.?Confidentiality still applies for telephone services, and nobody will record the visit. It is important to be in a quiet, private space that is free of distractions (including cell phone or other devices) during the visit.??      If during the course of the call I believe a telephone visit is not appropriate, you will not be charged for this service\"     Consent has been obtained for this service by care team member: Yes     Originating Site (Patient Location): [place], [place]    Distant Site (Provider Location): home office ([place], [place])      Identifying Information:  Client is a 25 year old,  (Black and White), single male. Client was self-referred for his diagnositic assessment. The purpose of this evaluation is to assess for ADHD. 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 Client attended the session alone.      Client's Statement of Presenting Concern:  Client reported seeking services at this time for diagnostic " assessment and recommendations for treatment. Client's presenting concerns include: making careless mistakes, problems attending to details, difficulty sustaining attention, problems listening when spoken to directly, not following through with tasks, difficulty organizing , avoiding tasks that require sustained mental effort, losing things often, being easily distracted and being forgetful. Client stated that symptoms have resulted in the following functional impairments: educational activities, organization and work / vocational responsibilities. Impairments have previously been present in academics as well.       History of Presenting Concern:  Client reported that he has not completed a previous ADHD diagnostic assessment. He reported that he attempted to pursue assessment when he began college, but because he was maintaining a C-average, providers did not continue the assessment process. This was reportedly frustrating for the client. Client reported that these problem(s) began in elementary school. He was taken to [facility] as a result, but when his parents  when he was 10 years old, follow-up with his educational needs was lacking. Client has not attempted to resolve these concerns in the past. Client reported that other professional(s) are not involved in providing support / services.      Social History:  Client reported he grew up in [place], [place] and is an only child. There are no known complications during pregnancy or delivery. Parents  when the client was 10 years old. Client reported that childhood was normal until this divorce, which created upheaval for the family.  Client described childhood family environment as nuturing, but somewhat chaotic after the divorce. As a child, Client reported that he had problems with organization and keeping track of items, misplaced or lost things and had problems paying attention, reading effectively, and completing homework. Client  reported no difficulty with childhood peer relationships. Client described his current relationships with family of origin as supportive with frequent communication.     Client reported that he is single and has no children. Client identified few stable and meaningful social connections (mom, a couple friends). Client reported that he has not been involved with the legal system.      Client's highest education level was some college. Client did not identify any learning problems. Client did receive tutoring services during the school years. Client did not receive special education services. Client reported diffiuclty with organization, paying attention, being distracted, having trouble retaining information he read, and having difficulty completing homework. Client did attend post-secondary school, first at [employer], then at the [employer] (before it closed).     Client did not serve in the . Client reported that he is employed full-time as a , which he enjoys. He has been working in this position for 3 years. Client has not been terminated from a place of employment. There are no ethnic, cultural or Synagogue factors that may be relevant for therapy. Client identified preferred language to be English. Client reported he does not need the assistance of an  or other support involved in treatment. Modifications will not be used to assist communication in treatment.     Client denied a history of head injuries.    Client reports family history includes family history includes Cancer in his maternal grandmother; Depression in his father; Diabetes in his mother; Heart Disease in his maternal grandfather; Hypertension in his paternal grandfather and paternal grandmother; Other Cancer in his paternal grandmother.      Mental Health History:  Client reported no family history of mental health issues.  Client previously received the following mental health diagnosis: an Anxiety  Disorder and Depression.   Client has received the following mental health services in the past: counseling.   Hospitalizations: None.   Previous/Current commitments: None.   Client is not currently receiving any mental health services.      Chemical Health History:  Client reported the following biological family members or relatives with chemical health issues: Maternal Grandfather reportedly used alcohol , Paternal Grandfather reportedly used alcohol .   Client has not received chemical dependency treatment in the past.   Client is not currently receiving any chemical dependency treatment.   Client reports no problems as a result of his drinking / drug use.        Client Reports:  Client reported that he rarely drinks alcohol.   Client denies using tobacco.  Client reported using marijuana daily for about the past 2 years.  Client reported drinking multiple sodas per day but is currently attempting to decrease this consumption.  Client denies using street drugs.  Client denies the non-medical use of prescription or over the counter drugs.    CAGE: None of the patient's responses to the CAGE screening were positive / Negative CAGE score   Based on the negative Cage-Aid score and clinical interview there are not indications of drug or alcohol abuse.    Discussed the general effects of drugs and alcohol on health and well-being, especially in the context of current attentional symptoms.      Significant Losses / Trauma / Abuse / Neglect Issues:  There are no indications or report of: significant losses, trauma, abuse or neglect.    Issues of possible neglect are not present.    Maltreatment and Abuse History:    Physical: denies   Emotional: denies   Sexual: denies      Medical Issues:  Client has not had a physical exam to rule out medical causes for current symptoms and he does not have a PCP. Client reports not having a psychiatrist. Client reported no current medical concerns. The client denies the presence of  chronic or episodic pain. As a child, client reported having regular and consistent sleep patterns. Client reported currently experiencing sleep disturbance, including: daytime drowsiness / fatigue and insomnia.  Client reported sleeping approximately 6-8 hours per night. Client reported that he has not completed a sleep study.    Current Outpatient Medications   Medication     hydrOXYzine (ATARAX) 25 MG tablet     nitroGLYcerin (NITRO-BID) 2 % OINT ointment     sertraline (ZOLOFT) 100 MG tablet     No current facility-administered medications for this visit.        Client Allergies:   No Known Allergies    Medical History:  Past Medical History:   Diagnosis Date     NO ACTIVE PROBLEMS        Medication Adherence:  Client reports taking prescribed medications as prescribed.    Client was provided recommendation to follow-up with prescribing physician as indicated.    Risk Taking Behaviors:  Client reported no history of risk taking behaviors      Motor Vehicle Operation:  Client has a valid 's license. According to client, other people are comfortable riding as passengers when he is driving.        Mental Status Assessment:  Appearance:   unable to assess - phone appointment   Eye Contact:   unable to assess - phone appointment   Psychomotor Behavior: unable to assess - phone appointment   Attitude:   Cooperative   Orientation:   All  Speech   Rate / Production: Normal/ Responsive   Volume:  Normal   Mood:    Normal  Affect:    unable to assess - phone appointment   Thought Content:  Clear   Thought Form:  Logical   Insight:    Good       Review of Symptoms:  Depression: Sleep Interest Energy Concentration Psychomotor slowing or agitation Hopeless Worthless sadness  Bella:  No symptoms  Psychosis: No symptoms  Anxiety: Worries  Panic:  No symptoms  Post Traumatic Stress Disorder: No symptoms  Obsessive Compulsive Disorder: No symptoms  Eating Disorder: No symptoms  Oppositional Defiant Disorder: No  symptoms  ADD / ADHD: Attention Listening Task Completion Organization Distractiblity Forgetful  Conduct Disorder: No symptoms  Reckless Behavior: No symptoms      Safety Issues and Plan for Safety and Risk Management:  Client has had a history of suicidal ideation: this occurred in 2017 and client denied thoughts about killing himself since that time  Client denies current fears or concerns for personal safety.  Client denies current or recent suicidal ideation or behaviors.  Client denies current or recent homicidal ideation or behaviors.  Client denies current or recent self injurious behavior or ideation.  Client denies other safety concerns.  Recommended that patient call 911 or go to the local ED should there be a change in any of these risk factors.      Patient's Strengths and Limitations:  Client identified the following strengths or resources that will aid his success: family support, insight, intelligence and work ethic. Client identified the following supports: family and friends. Things that may interfere with the client's success include: few friends.      Diagnostic Criteria:  F88:  A. A persistent pattern of inattention and/or hyperactivity-impulsivity that interferes with functioning or development, as characterized by (1) and/or (2):   1. Six or more inattention symptoms that have persisted for at least 6 months to a degree that is inconsistent with developmental level and that negatively impacts directly on social and academic/occupational activities.  B. Several symptoms (inattentive or hyperactive/impulsive) were present before the age of 12 years.  C. Several symptoms (inattentive or hyperactive/impulsive) present in ?2 settings (eg, at home, school, or work; with friends or relatives; in other activities).  D. There is clear evidence that the symptoms interfere with or reduce the quality of social, academic, or occupational functioning.  E. Symptoms do not occur exclusively during the course  of schizophrenia or another psychotic disorder, and are not better explained by another mental disorder (eg, mood disorder, anxiety disorder, dissociative disorder, personality disorder, substance intoxication, or withdrawal).    F33.1:  A. Five (or more) symptoms have been present during the same 2-week period and represent a change from previous functioning; at least one of the symptoms is either (1) depressed mood or (2) loss of interest or pleasure.   1. Depressed mood.   2. Diminished interest or pleasure in all, or almost all, activities.   3. Significant sleep change.   4. Fatigue or loss of energy.   5. Feelings of worthlessness or inappropriate guilt.   6. Diminished ability to think or concentrate, or indecisiveness.   B. The symptoms cause clinically significant distress or impairment in social, occupational, or other important areas of functioning.  C. The episode is not attributable to the physiological effects of a substance or to another medical condition.  D. The occurence of major depressive episode is not better explained by other thought / psychotic disorders.  E. There has never been a manic episode or hypomanic episode.     History of F41.1:  A. Excessive anxiety and worry, occurring more days than not for at least 6 months about a number of events or activities.   B. The individual finds it difficult to control the worry  C. The anxiety and worry are associated with 3 or more of 6 symptoms.  D. The anxiety, worry, or physical symptoms cause clinically significant distress or impairment in social, occupational, or other important areas of functioning.  E. The disturbance is not attributable to the physiological effects of a substance (e.g., a drug of abuse, a medication) or another medical condition (e.g., hyperthyroidism).  F. The disturbance is not better explained by another mental disorder (e.g., anxiety or worry about having panic attacks in panic disorder, negative evaluation in social  anxiety disorder [social phobia], contamination or other obsessions in obsessive-compulsive disorder, separation from attachment figures in separation anxiety disorder, reminders of traumatic events in posttraumatic stress disorder, gaining weight in anorexia nervosa, physical complaints in somatic symptom disorder, perceived appearance flaws in body dysmorphic disorder, having a serious illness in illness anxiety disorder, or the content of delusional beliefs in schizophrenia or delusional disorder).      Functional Status:  Client's symptoms have caused and are causing reduced functional status in the following areas: Academics / Education - unable to read for pleasure because he as problems staying focused; while in school, had difficulty paying attention, staying on task, and problems with organization  Activities of Daily Living - completing daily tasks  Occupational / Vocational - difficulty staying on task at work      DSM 5:         Diagnoses:   F88 Other Specified Neurodevelopmental Disorder (rule out Attention-Deficit/Hyperactivity Disorder)  F33.1 Major Depressive Disorder, recurrent episode, moderate  History of F41.1 Generalized Anxiety Disorder       Psychosocial Factors: lack of social support, mental health history, enjoys employment    WHODAS 2.0 Total Score 12/2/2020   Total Score 31     PHQ-9 SCORE 9/11/2019 8/17/2020 10/12/2020 12/2/2020   PHQ-9 Total Score 10 14 9 18       KATY-7 SCORE 8/17/2020 10/12/2020 12/2/2020   Total Score 9 15 13       Attendance Agreement:  Client has signed Attendance Agreement: No, discussed expectations at beginning of first session and patient agreed.       Preliminary Plan:  Medical necessity criteria is warranted in order to: Measure a psychological disorder and its severity and functional impairment to determine psychiatric diagnosis when a mental illness is suspected, or to achieve a differential diagnosis from a range of medical/psychological disorders that  present with similar constellations of symptoms (e.g., determination and measurement of anxiety severity and impact in the presence of ongoing asthma or heart disease), Perform symptom measurement to objectively measure treatment effectiveness and/or determine the need to refer for pharmacological treatment or other medical evaluation (e.g., based on severity and chronicity of symptoms) and Evaluate primary symptoms of impaired attention and concentration that can occur in many neurological and psychiatric conditions    Medical necessity for psychological assessment is warranted as a result of the following: (1) A specific clinical question is posed that relates to the condition/symptoms being addressed (2) The question cannot be adequately addressed by clinical interview and/or behavioral observation (3) Results of psychological testing are reasonably expected to provide an answer to the query (4) It is reasonably expected that the testing will provide information leading to a clearer diagnosis and/or guide treatment planning with an expectation of improved clinical outcome.    The client reports no currently identified Yazidism, ethnic or cultural issues relevant to therapy.     services are not indicated.    Modifications to assist communication are not indicated.    Collaboration / coordination with other professionals is not indicated at this time.     Referral to another professional/service is not indicated at this time.    A Release of Information is not needed at this time.    Client was given self and collaborative rating scales to be completed prior to the next appointment.  Depression and anxiety rating scales were completed. A second appointment was scheduled on 12/7/2020.    Report to child / adult protection services was NA.    Patient will have open access to his mental health medical record.    I acknowledge that, based upon the Diagnostic Assessment results, the patient and I have  reviewed and discussed issues pertaining to the purpose of therapy/testing, potential therapeutic goals, procedures, risks and benefits and estimated duration of therapy/testing. Issues pertaining to fees and confidentiality were also addressed with the patient indicated understanding. The patient was informed that their symptoms may change during the course of assessment, for better or worse, and this may impact the clinical approach and/or the duration of treatment; the patient understands that it is imperative that I am kept informed of the changes when they occur. I will not be providing any experimental procedures and, if we agree that a change in clinical procedure would be more beneficial, I will obtain specific consent for that procedure or refer you to another provider who has expertise in that area.         Lluvia Kemp PsyD, LP  Clinical Psychologist     Note reviewed and clinical supervision provided by Nicole Pineda, PhD LP 12/2/20

## 2020-12-03 ASSESSMENT — ANXIETY QUESTIONNAIRES: GAD7 TOTAL SCORE: 13

## 2020-12-07 ENCOUNTER — VIRTUAL VISIT (OUTPATIENT)
Dept: PSYCHOLOGY | Facility: CLINIC | Age: 25
End: 2020-12-07
Payer: COMMERCIAL

## 2020-12-07 DIAGNOSIS — F88 DEVELOPMENTAL MENTAL DISORDER: Primary | ICD-10-CM

## 2020-12-07 DIAGNOSIS — F41.1 GENERALIZED ANXIETY DISORDER: ICD-10-CM

## 2020-12-07 DIAGNOSIS — F33.1 MAJOR DEPRESSIVE DISORDER, RECURRENT EPISODE, MODERATE (H): ICD-10-CM

## 2020-12-07 PROCEDURE — 90832 PSYTX W PT 30 MINUTES: CPT | Mod: 95 | Performed by: PSYCHOLOGIST

## 2020-12-07 NOTE — PROGRESS NOTES
"                                             Progress Note    Patient Name: Artis Chou  Date: 2020       Service Type: Individual      Session Start Time: 9:00am  Session End Time:  9:16am     Session Length: 16    Session #: 2    Attendees: Client attended alone    Service Modality:  Phone Visit:      Provider verified identity through the following two step process.  Patient provided:  Patient     The patient has been notified of the following:      \"We have found that certain health care needs can be provided without the need for a face to face visit. This service lets us provide the care you need with a phone conversation.       I will have full access to your [place] medical record during this entire phone call. I will be taking notes for your medical record.      Since this is like an office visit, we will bill your insurance company for this service.       There are potential benefits and risks of telephone visits (e.g. limits to patient confidentiality) that differ from in-person visits.?  Confidentiality still applies for telephone services, and nobody will record the visit.  It is important to be in a quiet, private space that is free of distractions (including cell phone or other devices) during the visit.??      If during the course of the call I believe a telephone visit is not appropriate, you will not be charged for this service\"     Consent has been obtained for this service by care team member: Yes       PHQ 2020 10/12/2020 2020   PHQ-9 Total Score 14 9 18   Q9: Thoughts of better off dead/self-harm past 2 weeks Not at all Not at all Not at all       KATY-7 SCORE 2020 10/12/2020 2020   Total Score 9 15 13         DATA  Interactive Complexity: No  Crisis: No       Progress Since Last Session (Related to Symptoms / Goals / Homework):   Symptoms: No change as patient was able to provide further examples of how symptoms impacted him over the previous few " days    Homework: Patient completed and submitted Russell questionnaires as requested.      Current / Ongoing Stressors and Concerns:   Discussed manifestations of attention and concentration problems in various areas of his life.                  Treatment Objective(s) Addressed in This Session:          Continued with testing process - patient was emailed the MMPI-2 and will complete before feedback.                 Intervention:              Continued information gathering specific to ADHD and mood symptoms. Briefly reviewed Russell questionnaires. Discussed coping skills for attention and concentration problems. Ended session by discussing MMPI-2 and feedback process.        ASSESSMENT: Current Emotional / Mental Status (status of significant symptoms):   Risk status (Self / Other harm or suicidal ideation)   Patient denies current fears or concerns for personal safety.   Patient denies current or recent suicidal ideation or behaviors.   Patient denies current or recent homicidal ideation or behaviors.   Patient denies current or recent self injurious behavior or ideation.   Patient denies other safety concerns.   Patient reports there has been no change in risk factors since their last session.     Patient reports there has been no change in protective factors since their last session.     Recommended that patient call 911 or go to the local ED should there be a change in any of these risk factors.     Appearance:   unable to assess - phone appointment    Eye Contact:   unable to assess - phone appointment    Psychomotor Behavior: unable to assess - phone appointment    Attitude:   Cooperative    Orientation:   All   Speech    Rate / Production: Normal     Volume:  Normal    Mood:    Normal   Affect:    unable to assess - phone appointment    Thought Content:  Clear    Thought Form:  Coherent  Logical    Insight:    Good      Medication Review:   No changes to current psychiatric medication(s)     Medication  Compliance:   Yes     Changes in Health Issues:   None reported     Chemical Use Review:   Substance Use: Chemical use reviewed, no active concerns identified      Tobacco Use: No current tobacco use.      Diagnosis:  1. Developmental mental disorder    2. Major depressive disorder, recurrent episode, moderate (H)    3. Generalized anxiety disorder        Collateral Reports Completed:   Received Russell questionnaire from collateral (mother).      PLAN:   Patient was given instructions for completing the MMPI-2. After he completes this test, clinician will then compile data and have scheduling staff schedule feedback session.       Lluvia Kemp PsyD,   Clinical Psychologist    Note reviewed and clinical supervision provided by Nicole Pineda, PhD LP 12/7/20

## 2020-12-14 ENCOUNTER — OFFICE VISIT (OUTPATIENT)
Dept: FAMILY MEDICINE | Facility: CLINIC | Age: 25
End: 2020-12-14
Payer: COMMERCIAL

## 2020-12-14 ENCOUNTER — NURSE TRIAGE (OUTPATIENT)
Dept: NURSING | Facility: CLINIC | Age: 25
End: 2020-12-14

## 2020-12-14 VITALS
WEIGHT: 174.8 LBS | OXYGEN SATURATION: 98 % | DIASTOLIC BLOOD PRESSURE: 70 MMHG | TEMPERATURE: 98.3 F | HEIGHT: 67 IN | HEART RATE: 103 BPM | SYSTOLIC BLOOD PRESSURE: 138 MMHG | BODY MASS INDEX: 27.44 KG/M2

## 2020-12-14 DIAGNOSIS — K60.2 ANAL FISSURE: Primary | ICD-10-CM

## 2020-12-14 PROCEDURE — 99213 OFFICE O/P EST LOW 20 MIN: CPT | Performed by: NURSE PRACTITIONER

## 2020-12-14 ASSESSMENT — MIFFLIN-ST. JEOR: SCORE: 1727.26

## 2020-12-14 ASSESSMENT — PAIN SCALES - GENERAL: PAINLEVEL: MILD PAIN (3)

## 2020-12-14 NOTE — PATIENT INSTRUCTIONS
Your provider has referred you to: [facility]: [facility] - Myesha Riggs [phone]   [email or web address]

## 2020-12-14 NOTE — PROGRESS NOTES
"Subjective     Artis Chou is a 25 year old male who presents to clinic today for the following health issues:    HPI       Chief Complaint   Patient presents with     Rectal Problem     sore off and on since October. Worse with BM.      Patient was seen in 10/20 and was given nitroglycerin ointment and a referral to colorectal.  He notes that nitroglycerin did not really help and gave him a headache.  He did not follow-up with referral due to exposure to Covid-19 and problem improved slightly.  He now notes pain after every BM and blood with wiping.  He denies pruritis, constipation, diarrhea.       Review of Systems   Constitutional, HEENT, cardiovascular, pulmonary, gi and gu systems are negative, except as otherwise noted.      Objective    /70   Pulse 103   Temp 98.3  F (36.8  C) (Oral)   Ht 1.703 m (5' 7.05\")   Wt 79.3 kg (174 lb 12.8 oz)   SpO2 98%   BMI 27.34 kg/m    Body mass index is 27.34 kg/m .  Physical Exam   GENERAL: healthy, alert and no distress  RECTAL (male): normal sphincter tone, no rectal masses and anal fissure 9 o'clock  MS: no gross musculoskeletal defects noted, no edema            Assessment & Plan     Anal fissure  Patient to try nefidipine bid x 1 month.  If no improvement follow-up with colorectal.  - nifedipine 0.2% in white petrolatum 0.2 % OINT ointment; Apply topically 2 times daily            Return in about 3 months (around 3/14/2021) for Medication Recheck.    GIULIANO Hutchison CNP  [facility]      "

## 2020-12-15 DIAGNOSIS — K60.2 ANAL FISSURE: ICD-10-CM

## 2020-12-17 ENCOUNTER — TELEPHONE (OUTPATIENT)
Dept: FAMILY MEDICINE | Facility: CLINIC | Age: 25
End: 2020-12-17

## 2020-12-17 DIAGNOSIS — K60.2 ANAL FISSURE: Primary | ICD-10-CM

## 2020-12-17 NOTE — LETTER
2020    INSURER: Payor: BCBS / Plan: BCBS OUT OF STATE / Product Type: Indemnity /   ATTN:   Re: Prior Authorization Request  Patient: Artis Chou  Policy ID#:  [id number]  : 1995      To Whom it May Concern:    I am writing to formally request a prior authorization of coverage for my patient,  Artis Chou, for treatment using nifedipine ointment.  I am requesting authorization for applicable provider professional and facility services associated with this therapy.    The therapy involves using nifedipine ointment for treatment of anal fissure].      The benefits of the therapy include resolution of anal fissure.      I have treated Artis Chou since 18 and I have determined that it is medically appropriate for  this patient to receive be treated with nifedipine ointment for the reason(s) stated below:    Patient needs a trial of nifedipine ointment to heal an anal fissure.  He has tried  Nitroglycerin ointment without improvement.    I have included medical records pertaining to the patient s medical history, current condition and treatment plan.  In addition, the following billing codes will be used for therapy and follow-up: K60.2 Anal fissure.      I firmly believe that this therapy is clinically appropriate and that Artis Chou would benefit from improved [clinical outcomes, quality of life] if allowed the opportunity to receive this treatment.  Please contact me at Dept: [phone] if you require additional information to ensure the prompt approval for coverage.    Please send your written decision to me at this address:  [facility]  [address] [place] [id number] [phone]  Dept: [phone]        Sincerely,      Viki Bush, LAW [place]

## 2020-12-17 NOTE — TELEPHONE ENCOUNTER
A prior authorization is needed for the following compounded medications prescribed.  Please complete a prior authorization with the information included below.    Medication:Nifedipine 0.2% Oint (compound)    Ingredients                                                                  NDCs                                                Quantities                       Nifedipine Powder                                                   [address]-92                                                 0.2 g  White Saritha Ointment                                                 [address]-18                                              99.8 g                                                                                                    RX #:[id number]  Reason for Rejection:Product / Service not covered    Pharmacy Insurance plan:[facility]  BIN #:[id number]  ID #:[id number]  PCN #:n/a  Phone #:[phone]      Pharmacy NPI:[id number]        Please Backdate for 12/16/2020          Please advise the Compounding Pharmacy @ [phone] when the prior authorization is approved or denied.     Thank you for your time.        Saud Troncoso  Compounding Pharmacy Technician  [facility]   [address] [place] [address]   Phone: [phone]  Fax: [phone]

## 2020-12-21 NOTE — TELEPHONE ENCOUNTER
PRIOR AUTHORIZATION DENIED    Medication: Nifedipine 0.2% Oint (compound)- DENIED     Denial Date: 12/21/2020    Denial Rational: Nifedipine powder is a plan exclusion benefit under the pt's plan.      Appeal Information: If provider would like to appeal please provide a letter of medical necessity.

## 2020-12-21 NOTE — TELEPHONE ENCOUNTER
Central Prior Authorization Team  Phone: [phone]    PA Initiation    Medication: Nifedipine 0.2% Oint (compound)  Insurance Company: [employer] - Phone [phone] Fax [phone]  Pharmacy Filling the Rx: [facility] - [place], [place] - [address]  Filling Pharmacy Phone: [phone]  Filling Pharmacy Fax:    Start Date: 12/21/2020

## 2020-12-22 NOTE — TELEPHONE ENCOUNTER
Medication Appeal Initiation    We have initiated an appeal for the requested medication:  Medication: Nifedipine 0.2% Oint (compound)   Appeal Start Date:  12/22/2020  Insurance Company: [employer] - Phone [phone] Fax [phone]  Comments:   Faxed appeal letter to insurance. Fax# [phone]

## 2020-12-28 NOTE — TELEPHONE ENCOUNTER
Per call to [employer], they did not received appeal info below. Representative provided a different fax number to fax appeal info. Re-faxed appeal letter to Fax# [phone] ph: [phone].

## 2020-12-28 NOTE — TELEPHONE ENCOUNTER
MEDICATION APPEAL DENIED    Medication: Nifedipine 0.2% Oint (compound)- APPEAL DENIED     Denial Date: 12/28/2020    Denial Rational: This medication/ ingredient is a plan exclusion benefit.      Second Level Appeal Information:     Second level appeals will be managed by the clinic staff and provider. Please contact the [employer] Prior Authorization Team if additional information about the denial is needed.

## 2020-12-29 RX ORDER — NITROGLYCERIN 4 MG/G
1 OINTMENT RECTAL EVERY 12 HOURS
Qty: 30 G | Refills: 3 | Status: CANCELLED | OUTPATIENT
Start: 2020-12-29

## 2020-12-29 NOTE — TELEPHONE ENCOUNTER
Afshan, do you have any other ideas of a similar preparation that would be more likely to be covered?  Thanks,  Dr. Wood

## 2020-12-29 NOTE — TELEPHONE ENCOUNTER
I agree with the [facility] pharmacists recommendation.  Can you ask the patient if he wants this script sent as pended or if he no longer wants a prescription/rx?  Ok for RN to sign script as pended if patient agrees.

## 2020-12-29 NOTE — TELEPHONE ENCOUNTER
Harman Wood,    My name is Ari. I am a pharmacist on Afshan's team who is helping cover her messages while she is out today.    Nitroglycerin ointment appears to be covered and can be used to promote healing of anal fissures through increasing local blood flow and reducing pressure in the anal sphincter. In these ways, it works similarly to topical nifedipine.     If this is an acceptable alternative, please find the pended Rx attached in this encounter.    Ari Bailey, Pharm.D., [facility]  Medication Therapy Management Pharmacist  Page/VM:  [phone]

## 2020-12-30 NOTE — TELEPHONE ENCOUNTER
Spoke with pt. States he only paid $30 for the Nifedipine and it seems to be working better than the nitroglycerin which he already has. No further questions or concerns.    Abeba Rice RN  [facility]

## 2020-12-31 DIAGNOSIS — F41.1 GAD (GENERALIZED ANXIETY DISORDER): ICD-10-CM

## 2020-12-31 DIAGNOSIS — F33.1 MAJOR DEPRESSIVE DISORDER, RECURRENT EPISODE, MODERATE (H): ICD-10-CM

## 2021-01-04 RX ORDER — SERTRALINE HYDROCHLORIDE 100 MG/1
TABLET, FILM COATED ORAL
Qty: 30 TABLET | Refills: 2 | Status: SHIPPED | OUTPATIENT
Start: 2021-01-04 | End: 2021-01-12

## 2021-01-04 NOTE — TELEPHONE ENCOUNTER
Routing refill request to provider for review/approval because:  Medication is reported/historical  PHQ-9 score:    PHQ 12/2/2020   PHQ-9 Total Score 18   Q9: Thoughts of better off dead/self-harm past 2 weeks Not at all

## 2021-01-05 ENCOUNTER — VIRTUAL VISIT (OUTPATIENT)
Dept: PSYCHOLOGY | Facility: CLINIC | Age: 26
End: 2021-01-05
Payer: COMMERCIAL

## 2021-01-05 DIAGNOSIS — F33.1 MAJOR DEPRESSIVE DISORDER, RECURRENT EPISODE, MODERATE (H): ICD-10-CM

## 2021-01-05 DIAGNOSIS — F90.0 ATTENTION DEFICIT HYPERACTIVITY DISORDER, INATTENTIVE TYPE, MODERATE: Primary | ICD-10-CM

## 2021-01-05 DIAGNOSIS — F41.1 GENERALIZED ANXIETY DISORDER: ICD-10-CM

## 2021-01-05 PROCEDURE — 96131 PSYCL TST EVAL PHYS/QHP EA: CPT | Mod: 95 | Performed by: PSYCHOLOGIST

## 2021-01-05 PROCEDURE — 96130 PSYCL TST EVAL PHYS/QHP 1ST: CPT | Mod: 95 | Performed by: PSYCHOLOGIST

## 2021-01-05 NOTE — PROGRESS NOTES
"                                             Patient Name: Artis Chou  Date: 2021       Service Type:  Individual (ADHD feedback session)      Session Start Time: 8:00am  Session End Time:  8:22am     Session Length: 22 minutes    Session #: 3    Attendees: Patient attended alone    Service Modality: Phone Visit:      Provider verified identity through the following process.  Patient provided:  Patient     The patient has been notified of the following:      \"We have found that certain health care needs can be provided without the need for a face to face visit. This service lets us provide the care you need with a phone conversation.       I will have full access to your [facility] medical record during this entire phone call. I will be taking notes for your medical record.      Since this is like an office visit, we will bill your insurance company for this service.       There are potential benefits and risks of telephone visits (e.g. limits to patient confidentiality) that differ from in-person visits. Confidentiality still applies for telephone services, and nobody will record the visit. It is important to be in a quiet, private space that is free of distractions (including cell phone or other devices) during the visit.??      If during the course of the call I believe a telephone visit is not appropriate, you will not be charged for this service\"     Consent has been obtained for this service by care team member: Yes       PHQ 2020 10/12/2020 2020   PHQ-9 Total Score 14 9 18   Q9: Thoughts of better off dead/self-harm past 2 weeks Not at all Not at all Not at all       KATY-7 SCORE 2020 10/12/2020 2020   Total Score 9 15 13         DATA      Progress Since Last Session (Related to Symptoms / Goals / Homework):   Symptoms: Stable.    Homework: Completed.      Treatment Objective(s) Addressed in This Session:   Provided feedback on ADHD evaluation. Reviewed test results in " depth. Plan of care and recommendations were discussed based on testing data. See full report attached on secondary note in this encounter.     Intervention:   Provided feedback to patient regarding testing results, diagnoses, and treatment recommendations. Test results are consistent with an ADHD diagnosis. Symptoms are not explained by mood or anxiety disorder. Personalized suggestions regarding symptoms were offered. Patient had the opportunity to ask questions; he expressed understanding.        ASSESSMENT: Current Emotional / Mental Status (status of significant symptoms):   Risk status (Self / Other harm or suicidal ideation)   Patient denies current fears or concerns for personal safety.   Patient denies current or recent suicidal ideation or behaviors.   Patient denies current or recent homicidal ideation or behaviors.   Patient denies current or recent self injurious behavior or ideation.   Patient denies other safety concerns.   Patient reports there has been no change in risk factors since his last session.     Patient reports there has been no change in protective factors since his last session.     Recommended that patient call 911 or go to the local ED should there be a change in any of these risk factors.     Appearance:   unable to assess - phone appointment    Eye Contact:   unable to assess - phone appointment    Psychomotor Behavior: unable to assess - phone appointment    Attitude:   Cooperative    Orientation:   All   Speech    Rate / Production: Normal     Volume:  Normal    Mood:    Normal   Affect:    unable to assess - phone appointment    Thought Content:  Clear    Thought Form:  Coherent  Logical    Insight:    Good      Medication Review:   No changes to current psychiatric medication(s)     Medication Compliance:   Yes     Changes in Health Issues:   None reported     Chemical Use Review:   Substance Use: Problem use continues with no change since last session, patient currently using  marijuana.        Tobacco Use: No current tobacco use.      Diagnosis:  1. Attention deficit hyperactivity disorder, inattentive type, moderate    2. Major depressive disorder, recurrent episode, moderate (H)    3. Generalized anxiety disorder          PLAN:   Recommendations are outlined in full evaluation report (attached to this encounter).   Patient indicated understanding and will contact the clinic if there are further questions. A follow-up session was scheduled on 2/4/2021.    Report routed to referring provider.      Lluvia Kemp PsyD, LP  Clinical Psychologist    Note reviewed and clinical supervision provided by Nicole Pineda, PhD LP 1/6/21         Psychological Testing Services Summary       Testing Evaluation Services Base: 93734  (first 60 mins) Add-on: 89841  (each addtl 60 mins)   Record Review and Clarify Referral Question   12:50pm-1:00pm on 12/2/2020 10 minutes   Clinical Decision Making/Battery Modification   8:45am-9:00am on 12/7/2020 15 minutes   Integration/Report Generation   10:30am-11:30am on 12/22/2020 ([place])  8:00am-8:45am on 12/24/2020 (MMPI-2)  8:45am-9:45am on 12/24/2020 (Final Report)   60 minutes  45 minutes  60 minutes   Interactive Feedback Session   8:00am-8:22am on 1/5/2021    22 minutes   Post-Service Work   8:22am-8:40am on 1/5/2021 18 minutes   Total Time: 230 minutes   Total Units: 1 3       Diagnoses:

## 2021-01-05 NOTE — PATIENT INSTRUCTIONS
Coping with Attention-Deficit/Hyperactivity Disorder (ADHD)    If you have ADHD, it can be hard to sit still, pay attention or control impulsive behavior. ADHD can cause problems at home, at school, at work and in social settings. But you can learn ways to control your symptoms. Below are some ideas for coping with the most common ADHD problems.     Memory, Focus, and Managing Time    Be mindful of time. Use a watch, phone, timer, alarm, PDA or computer--anything that keeps accurate time that you can see and hear at all times. Set more than one alarm to remind you how much time you have left for a task. Give yourself more time than you think you need. For important appointments or deadlines, set reminder alarms hours or days ahead of time.     Use a day planner. A day planner can help you manage time and remember responsibilities. Learning to use a planner is just like learning to use any tool--practice makes perfect.     Use lists. Lists and notes can help you keep track of regular tasks, projects, deadlines and appointments. If you decide to use a daily planner, keep all lists and notes inside of it. Use color codes for tasks so you know which ones are the most important.    Learn to say no and set boundaries. Do not take on too many projects or social engagements. Overbooking yourself can be overwhelming and can lead to missed commitments.    Repeat out loud the instructions you have been given. This will help you remember, as well as let others correct you if needed.    Organization    Create space. Ask yourself what you need on a daily basis. Then, find storage bins or closets for things you don t need every day. Have specific areas for things like keys, bills and other items that are easy to misplace. Throw away or donate things you don t need.     Deal with it now. You can avoid forgetfulness, clutter and procrastination by doing things right away, not some time in the future. This includes filing papers,  cleaning up messes, opening and responding to mail and returning phone calls.    Set up a filing system. Use dividers or separate file folders for different types of documents, such as medical records, receipts and pay stubs. Label and color-code your files so that you can quickly find what you need.     Break larger tasks into small, manageable pieces. Write down the steps needed to complete the task. Follow each step in order until the task is done. If needed, take small, timed breaks and return to finish the task.    Organize your work space. Use lists or planners to organize your day. Remove clutter from your desk. Label any storage bins. Reduce distraction as much as possible. Ideas include sitting facing the wall, closing your door or using a white noise machine.    Sitting Still    Move around as needed. Don t fight the urge to move around. If you need to fidget or stand up for a period of time to help maintain attention, then do so. But take care that you re not interrupting others. You may also find it helpful to squeeze a stress ball.    Be active in a useful way. Exercise can burn off extra energy, relieve stress, boost your mood and calm your mind. Meditation, yoga or alexa chi can help you better control your attention and impulses.    Stay healthy. Get enough sleep. Avoid caffeine late in the day. Exercise. Create a relaxing bedtime routine. Stick to a schedule or daily routine. Eat small meals throughout the day. Avoid sugar, eat fewer carbohydrates and eat more protein.     Close Relationships    Talk to your loved ones about ADHD. There are many resources available that can help your loved one understand ADHD. See the Resources section below.    Divide daily tasks based on each person s strengths. For example, if you have trouble with organization, you may not want to do financial planning tasks.    If you have trouble with focus, develop a sign that others in your life can use as a gentle reminder to  pay attention. The sign should be small but meaningful to you and the other person.    Improve communication. Use a dry erase board in a common area to help the whole family stay in touch better. Keep regular to-do lists and compare scheduled activities every day. Writing notes to each other is also very helpful.    Be an active listener and try not to interrupt. If you find your mind wandering when others are talking, mentally repeat their words so you can follow the conversation. Ask questions and ask people to repeat what they said if needed. Pay close attention to body language.     Organize your thoughts. If you need to have a serious conversation, write a list of the points you would like to make or the important ideas you want to talk about. This can help you stay focused and remember what you need to say.    Think before speaking. It can be hard to control your impulses when you feel strongly about something. Before saying whatever pops into your head, stop to ask yourself  Will this be helpful?  or  Will this help me get what I want?     Take charge of your life. Work to accept that some things are harder for you. Make a plan to address these troubles and seek the support you need.    Online Resources    ADD WarehOperation Supply Drop. http://www.CompStak.com    ADHD and You.  Tips for Adults with ADHD.  http://www.adhdandyou.com/adhd-patient/adhd-tips-young-adult/    Attention Deficit Disorder Association. http://www.add.org    Attention Deficit Disorder Resources. http://www.addresources.org    Children and Adults with Attention-Deficit/Hyperactivity Disorder (LIDYA). http://www.lidya.org/    Ambreen Chou.  33 Ways to Get Organized with Adult ADHD.  http://www.additudemag.com/adhd/article/729.html    National Resource Center on ADHD. http://www.uybt8yzxq.org/    Gifty Turner.  Daily Living Tips for Adult ADHD.  http://www.medicinenet.com/living_tips_adult_adhd_pictures_slideshow/article.htm    Chet Hodges and  Josy Garcia.  Help for Adult ADD / ADHD.  http://www.helpguide.org/mental/adhd_add_adult_strategies.htm    You can also find many apps for your phone that can help you with common ADHD struggles    Book Resources    Oral Wells. ADHD in Adults. (2008).    Oral Wells. Taking Charge of Adult ADHD. (2010).    Robert Kelly and Shon Verduzco. Delivered from Distraction. (2006).    Robert Kelly and Shon Verduzco. Driven to Distraction. (2011).    Raysa Chou. ADD in the Workplace. (1997).    Raysa Chou. ADD-Friendly Ways to Organize Your Life. (2002).    Laura Blevins. The ADHD Effect on Marriage: Understand and Rebuild Your Relationship in Six Steps. (2010).    Maria Del Rosario Mcclain and Tamra Olmedo. (2006).    Alejandro Mason. Understand Your Brain, Get More Done: The ADHD Executive Functions Workbook. (2012).    Support Groups  ADHD Adult Support Group  [address].  7:00 to 8:30 p.m., last Thursday of each month (except December).  Contact/RSVP: [email or web address]    ADHD Adult Support Group  [facility], [address] [place] [address].  Thursday 10:00 a.m. to 12:00 p.m. or 7:00 to 9:30 p.m.  Contact/RSVP: Nam Trujillo. [phone] or [email or web address]     ADHD Adult Support Group for Spouses/Partners of adults with ADHD  [facility], [address] [place] [address].  Tuesday 12:00 to 2:00 p.m.   Contact/RSVP: Nam Trujillo. [phone] or [email or web address]

## 2021-01-05 NOTE — PROGRESS NOTES
[facility] - ADHD Evaluation     Patient: Artis Chou  YOB: 1995  MRN: [id number]  Date(s) of assessment: 12/2/2020 and 12/7/2020  Referral Source: Self-referred  Reason for Referral: assessing reported deficits in executive functioning     IDENTIFYING INFORMATION AND BRIEF HISTORY OF PRESENTING PROBLEM: Artis Chou is a 25-year-old  man who presented to the initial diagnostic intake on 12/2/2020 due to primary concerns with his ability to pay attention and stay focused. As a child, the patient reported that he was unorganized, would lose items frequently, had difficulty paying attention in class, was distracted by peers, had difficulty focusing on reading assignments, had problems completing homework, and attended the [employer] for several years in elementary school to help his learning. Because his parents  when he was 10 years old, his household was disrupted, and he stopped attending [employer]. He reported that he requested an ADHD evaluation while he was in college at [employer], but because he was maintaining a C-average, providers would not continue the assessment process. Currently, the patient reported the following symptoms: making careless mistakes/problems attending to details (glosses over important steps at work), difficulty sustaining attention (unable to pay attention in meeting at work, difficulty reading), problems listening when spoken to directly (notices that he zones out and misses what others are saying), not following through with tasks (hard to complete laundry, dishes, important tasks at work and then things go unfinished), difficulty organizing (items are  all over the place ), avoiding tasks that require sustained mental effort (procrastinates often), losing things often (headphones and keys are commonly lost), being easily distracted (has been reprimanded at work for being distracted), and being  forgetful (forgets plans, forgets food supplies at store while shopping for work, forgets to take out grease trap at work). The patient indicated that he is seeking treatment because symptoms are interfering with his social and work () functioning.    Mental Health History: The patient reported a history of depression and anxiety, which were both diagnosed in 2017. He reported that he has experienced significant periods of relief from his depression symptoms in the past and is currently experiencing relief from his anxiety symptoms. The patient denied a history of manic symptoms, phobic responses, symptoms of obsessive-compulsive disorder, trauma, and perceptual difficulties. The patient denied issues with sexual compulsivity, gambling, and disordered eating.    Developmental History: The patient reported that he believes that he was born following a full-term pregnancy, and there were no complications during the pregnancy or birth. The patient reported that he believes he met all of his developmental milestones on time. He denied a history of head injuries and learning disorders. He described that he earned poor grades in school and attended [employer] until he was 10 years old. His parents  divorce at that age reportedly created a significant disruption in family s life resulting in his academic problems no longer being addressed. The patient recalled academic strengths in history and science as well as weaknesses in math, reading, chemistry, and [place].    Chemical Dependence/Substance Abuse History: The patient reported that he does not drink alcohol but does use marijuana on a daily basis. He began smoking daily about two years ago.      SOURCES OF DATA/ASSESSMENT: Review of medical and psychiatric records, consideration of behavioral observations during the testing (if applicable), and the results of the psychological tests are all considered in the preparation of this psychological test  report. It is important to note that test results comprise a hypothesis of the patient s mental health concerns and are not an independent or conclusive assessment. Test results are combined with the patient s available medical, psychological, behavioral data for an integrated interpretation and report. Due to virtual/remote administration, certain aspects of the assessment process were impacted, such as access to direct patient observation, and maintaining an environment conducive to testing. As such, external factors have the potential to affect the validity of data collected.    TESTS ADMINISTERED*:    Russell Adult ADHD Rating Scale-IV: Self and Other Reports (BAARS-IV)    Russell Functional Impairment Scale: Self and Other Reports (BFIS)    Russell Deficits in Executive Functioning Scale (BDEFS)    Generalized Anxiety Disorder Questionnaire (KATY-7)    Patient Health Questionnaire- 9 (PHQ-9)    [place] Multiphasic Personality Inventory - 2 (MMPI - 2)   *all measures administered remotely    BEHAVIORAL OBSERVATIONS: The patient was pleasant and cooperative throughout all interview and explanation of testing process. The patient was oriented to person, place, and time. Mood was neutral. Eye contact was adequate and speech was at normal rate and rhythm. Motor activity was appropriate. Due to virtual/remote administration, direct patient observation was not possible during the testing process, and it is unknown if the patient was able to maintain an environment conducive to testing. As such, external factors have the potential to affect the validity of data collected.     TEST RESULTS: Test results comprise a hypothesis of the patient s mental health concerns and are not an independent or conclusive assessment. Test results are combined with the patient s available medical, psychological, behavioral, and observational data for an integrated interpretation and report.    Russell Adult ADHD Rating Scale-IV: Self and  "Other Reports (BAARS-IV)  The BAARS-IV assesses for symptoms of ADHD that are experienced in one's daily life. This assessment measure includes self and collateral rating scales designed to provide information regarding current and childhood symptoms of ADHD including inattention, hyperactivity, and impulsivity. Self-report scores are reported as percentiles. Scores at the 76th-83rd percentile are considered marginal, scores at the 84th-92nd percentile are considered borderline, scores at the 93rd-95th percentile are considered mild, scores at the 96th-98th percentile are considered moderate, and those at the 99th percentile are considered severe. Collateral or \"other\" rating scales are reported as number of symptoms observed in comparison to those reported by the client. Norms and percentile scores are not available for collateral reports.     Current Symptoms Scale--Self Report:   Client completed the self-report inventory of current symptoms. The results indicate that the client's Total ADHD Score was 57 which places the patient in the 99+ percentile for overall ADHD symptoms. In addition, the client endorsed 9/9 (99+ percentile) Inattention symptoms, 6/9 Hyperactivity-Impulsivity symptoms, (98th percentile), and 8/9 (98th percentile) Sluggish Cognitive Tempo symptoms. Client indicated that the reported symptoms have resulted in impaired functioning in school, home, work, and social relationships. Overall, the results suggest the client is experiencing severe ADHD symptoms.     Current Symptoms Scale--Other Report:  Client's mother completed the collateral report inventory of current symptoms. Based on the collateral contact's observation of symptoms, the client demonstrates 9/9 Inattention symptoms, 2/5 Hyperactivity symptoms, 1/4 Impulsivity symptoms, and 9/9 Sluggish Cognitive Tempo symptoms. The client's Total ADHD Score was 57. The collateral contact indicated the client demonstrates impaired functioning in " "school, home, work, and social relationships.  The collateral- and self-report scores are not significantly different.    Childhood Symptoms Scale--Self-Report:  Client completed the self-report inventory of childhood symptoms. The results indicate that the client's Total ADHD Score was 61 which places the patient in the 99+ percentile for overall ADHD symptoms in childhood. In addition, the client endorsed 9/9 (99+ percentile) Inattention symptoms and 8/9 (98th percentile) Hyperactivity-Impulsivity symptoms. Client indicated that the reported symptoms resulted in impaired functioning in school, home, and social relationships. Overall, the results suggest the client experienced severe symptoms of ADHD as a child.     Childhood Symptoms Scale--Other Report:  Client's mother completed the collateral report inventory of childhood symptoms. Based on the collateral contact's recollection of client's childhood symptoms, the client demonstrated 8/9 Inattention symptoms and 8/9 Hyperactivity-Impulsivity symptoms. The client's Total ADHD Score was 62. The collateral contact indicated the client demonstrates impaired functioning in school, home, and social relationships. The collateral- and self-report scores are not significantly different.                           Russell Functional Impairment Scale: Self and Other Reports (BFIS)  The BFIS is used to assess an individuals' psychosocial impairment in major life/daily activities that may be due to a mental health disorder. This assessment measure includes self and collateral rating scales. Self-report scores are reported as percentiles. Scores at the 76th-83rd percentile are considered marginal, scores at the 84th-92nd percentile are considered borderline, scores at the 93rd-95th percentile are considered mild, scores at the 96th-98th percentile are considered moderate, and those at the 99th percentile are considered severe. Collateral or \"other\" rating scales are reported " "as number of symptoms observed in comparison to those reported by the client. Norms and percentile scores are not available for collateral reports.     Results indicate the client identified impairment (scores at or greater than 93rd percentile) in the following areas: home-family, home-chores, work, social-strangers, social-friends, community activities, education, marriage/cohabiting/dating, money management, daily responsibilities, and health maintenance. The client's Mean Impairment Score was 7.1 (98th percentile) indicating the client is reporting 78% impairment in functioning across domains. Client's mother completed the collateral rating scale, which indicated similar results. The collateral contact's scores were generally lower than the client's report.     Russell Deficits in Executive Functioning Scale (BDEFS)  The BDEFS is a measure used for evaluating dimensions of adult executive functioning in daily life. This assessment measure includes self and collateral rating scales. Self-report scores are reported as percentiles. Scores at the 76th-83rd percentile are considered marginal, scores at the 84th-92nd percentile are considered borderline, scores at the 93rd-95th percentile are considered mild, scores at the 96th-98th percentile are considered moderate, and those at the 99th percentile are considered severe. Collateral or \"other\" rating scales are reported as number of symptoms observed in comparison to those reported by the client. Norms and percentile scores are not available for collateral reports.     Results indicate the client's Total Executive Functioning Score was 258 (99+ percentile). The ADHD-Executive Functioning Index score was 29 (96th percentile). These scores suggest the client has moderate deficits in executive functioning. Results indicate the client identified significant deficits in the following areas: self-management to time (moderate deficits), self-organization/problem-solving " (moderate deficits), self-restraint (borderline deficits), self-motivation (moderate deficits) and self-regulation of emotions (moderate deficits). Client's mother completed the collateral rating scale, which indicated similar results. The collateral contact's scores were generally lower than the client's report.    Generalized Anxiety Disorder Questionnaire (KATY-7)  This questionnaire is designed to assess for anxiety in adults. Based on the score, the patient is experiencing moderate symptoms of anxiety. Client identified the following symptoms of anxiety: feeling on edge/nervous/anxious, difficulty controlling worry, worrying about many different things, trouble relaxing, being restless, and becoming easily annoyed or irritable.    Patient Health Questionnaire- 9 (PHQ-9)   This questionnaire is designed to assess for depression in adults. Based on the score, the patient is experiencing moderate symptoms of depression. Client identified the following symptoms of depression: depressed mood, lack of interest, difficulty with sleep, feeling tired or having little energy, poor appetite or overeating, poor concentration, and restlessness or lethargy.    [place] Multiphasic Personality Inventory - 2 (MMPI-2)    The MMPI-2 was administered to evaluate current level of emotional distress. Validity profile indicates that the patient appears to have answered in a generally straightforward and consistent manner, and obtained results are considered to be reliable and valid in representing current psychological status. No items were omitted.      Emotional Well-being: At this time, the patient is likely experiencing significant depression and anxiety symptoms, which are characterized by feeling sad/blue, having a lack of drive, worrying, brooding, having low energy, and feeling irritable. The problems have been longstanding and may be contributing to a general feeling of unhappiness and as though life is a strain.       Cognitions: The patient is likely experiencing significant attention, concentration, and memory problems. Additionally, his depression and anxiety symptoms likely manifest in rumination and tendency to focus on the negative aspects of life. He is probably self-critical and pessimistic in his outlook.    Behaviors: The patient is more likely to internalize symptoms. He is probably experiencing low energy and is engaging in substance use.     Interpersonal Style: In his relationships, he is shy and others would probably describe him as an introvert. He is probably sensitive and may be unreliable at times. Although he may not avoid social situations, he is probably feeling lonely and isolated.     Coping: In an attempt to cope with his problems, the patient probably withdraws and/or uses substances. However, due to the longstanding nature of his problems, he likely feels as though his coping mechanisms are insufficient. The patient probably has a low motivation for treatment (psychotherapy).     SUMMARY: Artis Chou is a 25-year-old  man who completed psychological testing remotely/virtually due to the COVID-19 pandemic. Testing was requested to provide updated diagnostic clarification and necessary treatment recommendations.    Patient first completed a diagnostic interview in which mental health symptoms, ADHD symptoms, and background information was gathered. Patient self-reported nine symptoms of inattention and indicated that his abilities to complete tasks at home and at work are significantly impaired. Further, his self-reported symptoms on Russell measures of ADHD symptoms were consistent with this information. Both he and his mother described significant symptoms in him currently and as a child.     Personality testing indicated that the patient is likely experiencing significant depression and anxiety symptoms at this time, consistent with self-reported information that he was diagnosed  with depression and anxiety about three years ago. It is likely that his difficulties with attention, concentration, and memory are increasing and/or exacerbated by these mental health symptoms. Of note, attention problems reportedly pre-date mental health symptoms and personality testing detected that his mental slowness is not associated with depression. It is likely that he feels incapable of coping with his problems. As a result, both psychotropic medication and psychotherapy are recommended below.      Referral Question Response: DSM 5 criteria for ADHD:   A. Symptom Count - Are there sufficient symptoms for the diagnosis? Yes, patient endorsed sufficient symptoms.   B. Onset - Were several symptoms present before 10 years of age? Yes, both self-reported and collateral information indicate that symptoms were present during elementary school.   C. Pervasiveness - Are several symptoms present in at least two settings? Yes, patient reported that symptoms are problematic at home and work. They were previously problematic when the patient attended school.   D. Impairment - Do symptoms interfere with or reduce the quality of functioning? Yes, patient is unable to complete daily and work tasks effectively.   E. Exclusions - Are symptoms better explained by another disorder or factor? No, symptoms are not better explained by anxiety and depression symptoms. Difficulties are explained by an organic basis of inattention.     The previous diagnosis of generalized anxiety disorder will be maintained:  A. Excessive anxiety and worry, occurring more days than not for at least 6 months about a number of events or activities.   B. The individual finds it difficult to control the worry.  C. The anxiety and worry are associated with 3 or more of 6 symptoms.  D. The anxiety, worry, or physical symptoms cause clinically significant distress or impairment in social, occupational, or other important areas of functioning.  E. The  disturbance is not attributable to the physiological effects of a substance (e.g., a drug of abuse, a medication) or another medical condition (e.g., hyperthyroidism).  F. The disturbance is not better explained by another mental disorder.    The previous diagnosis of major depressive disorder, recurrent episode, moderate will be maintained:  A. Five (or more) symptoms have been present during the same 2-week period and represent a change from previous functioning; at least one of the symptoms is either (1) depressed mood or (2) loss of interest or pleasure.   1. Depressed mood.   2. Diminished interest or pleasure in all, or almost all, activities.   3. Significant sleep change.   4. Fatigue or loss of energy.   5. Feelings of worthlessness or inappropriate guilt.   6. Diminished ability to think or concentrate, or indecisiveness.   B. The symptoms cause clinically significant distress or impairment in social, occupational, or other important areas of functioning.  C. The episode is not attributable to the physiological effects of a substance or to another medical condition.  D. The occurrence of major depressive episode is not better explained by other thought / psychotic disorders.  E. There has never been a manic episode or hypomanic episode.     DIAGNOSES:  F90.0 Attention-Deficit/Hyperactivity Disorder, inattentive presentation, moderate  F41.1 Generalized Anxiety Disorder  F33.1 Major Depressive Disorder, recurrent episode, moderate    PLAN OF CARE:  1. Discuss the following with your primary care provider:  a. Consider a trial of a stimulant medication. This may help alleviate some of the patient s attentional symptoms.   b. Consider a trial of a psychotropic medication. This may help alleviate some of the patient s depression and anxiety symptoms.     2. Consider initiating individual psychotherapy to help alleviate mood symptoms. Research indicates that outcomes are best with both medication and therapy.  You can call the [facility] Behavioral Access line at [phone].   a. Interventions should focus on cognitive restructuring to help change the patient s current, potentially maladaptive, thinking style.   i. Helping the patient learn to handle his automatic appraisals rather than eliminate them may also be helpful.   b. The patient may struggle with continuing to rely on avoidance behaviors. Interventions should support healthy coping.   c. Consistent with his personality style, the patient may show little emotion in therapy.    RECOMMENDATIONS:  1. Due to the patient s reported attention, concentration, and mood difficulties, the following health/lifestyle changes when combined, can significantly improve symptoms:   a. Avoid simple carbohydrates at breakfast. Aim for only complex carbohydrates and lean protein for your morning meal.   b. Engage in aerobic exercise 3 times per week for 30 minutes, ensuring that your heart rate stays within your training zone. Further, reading the book,  Spark,  by Shon Verduzco M.D can help the patient understand the benefits of exercise on the brain.   c. Research suggest that taking a high-quality multi-vitamin and antioxidant (1/2 cup of blueberries) daily in conjunction with balanced nutrition can be helpful.  d. Aim for the high end of daily water intake: around 72 ounces per day.  e. Ensure regular meals and snacks to maintain optimal attention.    2. The following may be beneficial in managing some of the patient s attention and concentration difficulties:  a. Due to the patient s difficulties with attention and concentration, he should consider working in a completely distraction-free area while completing tasks. His workspaces should be completely clear except for the materials he needs for the current task. Both visual and auditory distractions should be decreased as much as possible.  b. Considering his decreased ability to focus and maintain attention, it is  recommended that the patient take frequent breaks while completing tasks. This will help him to maintain attention and effort. He may benefit from the use of a [employer] Timer. The timer works by using built-in break times. After working on a task consistently for 25 minutes, the timer reminds the user to take a five-minute break before continuing, etc. A [employer] timer can be downloaded as a free adela to a phone or tablet.  c. Due to the patient s attentional and concentration symptoms, it is recommended that he increase organization with the use of lists and calendars. Significantly increasing structure to the day and adhering to a set schedule can increase his ability to complete responsibilities, track deadline, etc. Breaking these tasks down into their component parts and recording them in a calendar/planner will likely be beneficial. Patient would benefit from setting feasible timelines for completion of activities. By establishing clear priorities for completing tasks, he can more likely complete the most important tasks first. The patient may also choose to elect to a friend or family member to help hold her accountable.    3. Due to the patient s difficulties with rumination before bedtime, it is recommended to engage in a relaxing activity up to the point of sleep. He may want to spend time reading, drawing/ coloring, practicing mindfulness, listening (not watching) to podcasts, music, etc., or try the pyco adela, which is free to download and may help him get to sleep more easily.    4. The patient may benefit from engaging in mindfulness practices. He might consider breathing techniques, apps that provide guided meditation, or more interactive activities such as coloring.    5. See the attached tip sheep for more ideas as well as online and book resources.       Lluvia Kemp PsyD, LP  Clinical Psychologist     Note reviewed and clinical supervision provided by Nicole Pineda, PhD LP 1/6/21

## 2021-01-12 ENCOUNTER — OFFICE VISIT (OUTPATIENT)
Dept: FAMILY MEDICINE | Facility: CLINIC | Age: 26
End: 2021-01-12
Payer: COMMERCIAL

## 2021-01-12 VITALS
BODY MASS INDEX: 28.49 KG/M2 | HEART RATE: 76 BPM | HEIGHT: 68 IN | DIASTOLIC BLOOD PRESSURE: 72 MMHG | TEMPERATURE: 96.6 F | WEIGHT: 188 LBS | SYSTOLIC BLOOD PRESSURE: 125 MMHG

## 2021-01-12 DIAGNOSIS — F41.1 GAD (GENERALIZED ANXIETY DISORDER): ICD-10-CM

## 2021-01-12 DIAGNOSIS — F33.1 MAJOR DEPRESSIVE DISORDER, RECURRENT EPISODE, MODERATE (H): ICD-10-CM

## 2021-01-12 DIAGNOSIS — F90.9 ATTENTION DEFICIT HYPERACTIVITY DISORDER (ADHD), UNSPECIFIED ADHD TYPE: Primary | ICD-10-CM

## 2021-01-12 PROCEDURE — 99214 OFFICE O/P EST MOD 30 MIN: CPT | Performed by: PHYSICIAN ASSISTANT

## 2021-01-12 RX ORDER — SERTRALINE HYDROCHLORIDE 100 MG/1
150 TABLET, FILM COATED ORAL DAILY
Qty: 135 TABLET | Refills: 0 | Status: SHIPPED | OUTPATIENT
Start: 2021-01-12 | End: 2021-02-23

## 2021-01-12 RX ORDER — DEXTROAMPHETAMINE SACCHARATE, AMPHETAMINE ASPARTATE MONOHYDRATE, DEXTROAMPHETAMINE SULFATE AND AMPHETAMINE SULFATE 2.5; 2.5; 2.5; 2.5 MG/1; MG/1; MG/1; MG/1
10 CAPSULE, EXTENDED RELEASE ORAL DAILY
Qty: 30 CAPSULE | Refills: 0 | Status: SHIPPED | OUTPATIENT
Start: 2021-01-12 | End: 2021-02-02 | Stop reason: DRUGHIGH

## 2021-01-12 ASSESSMENT — MIFFLIN-ST. JEOR: SCORE: 1794.32

## 2021-01-12 NOTE — PROGRESS NOTES
"  Assessment & Plan     Attention deficit hyperactivity disorder (ADHD), unspecified ADHD type  Just dx.  Reviewed records.  Start low dose.  Follow up in 3 weeks.  Discussed clinic policy and possible side effects  - amphetamine-dextroamphetamine (ADDERALL XR) 10 MG 24 hr capsule; Take 1 capsule (10 mg) by mouth daily    Major depressive disorder, recurrent episode, moderate (H)  Stable.  He doesn't feel a need to increase.    - sertraline (ZOLOFT) 100 MG tablet; Take 1.5 tablets (150 mg) by mouth daily    KATY (generalized anxiety disorder)  As above  - sertraline (ZOLOFT) 100 MG tablet; Take 1.5 tablets (150 mg) by mouth daily    Review of external notes as documented above                          Return in about 3 weeks (around 2/2/2021) for mood, adhd.    Elen Box PA-C  [facility] GRETA Wisdom is a 25 year old who presents to clinic today for the following health issues     HPI       Medication Followup of Zoloft, taking 150 mg daily     Taking Medication as prescribed: yes    Side Effects:  None    Medication Helping Symptoms:  yes   Doing well on depression.    Medication for ADHD, was diagnosed 1 week ago.          Review of Systems   As above      Objective    /72   Pulse 76   Temp 96.6  F (35.9  C) (Tympanic)   Ht 1.715 m (5' 7.5\")   Wt 85.3 kg (188 lb)   BMI 29.01 kg/m    Body mass index is 29.01 kg/m .  Physical Exam  Constitutional:       General: He is not in acute distress.  Neurological:      Mental Status: He is alert.   Psychiatric:         Mood and Affect: Mood normal.                        "

## 2021-01-13 ASSESSMENT — PATIENT HEALTH QUESTIONNAIRE - PHQ9
SUM OF ALL RESPONSES TO PHQ QUESTIONS 1-9: 11
5. POOR APPETITE OR OVEREATING: SEVERAL DAYS

## 2021-01-13 ASSESSMENT — ANXIETY QUESTIONNAIRES
3. WORRYING TOO MUCH ABOUT DIFFERENT THINGS: NOT AT ALL
2. NOT BEING ABLE TO STOP OR CONTROL WORRYING: SEVERAL DAYS
GAD7 TOTAL SCORE: 7
6. BECOMING EASILY ANNOYED OR IRRITABLE: SEVERAL DAYS
5. BEING SO RESTLESS THAT IT IS HARD TO SIT STILL: MORE THAN HALF THE DAYS
7. FEELING AFRAID AS IF SOMETHING AWFUL MIGHT HAPPEN: SEVERAL DAYS
1. FEELING NERVOUS, ANXIOUS, OR ON EDGE: SEVERAL DAYS

## 2021-01-14 ASSESSMENT — ANXIETY QUESTIONNAIRES: GAD7 TOTAL SCORE: 7

## 2021-02-02 ENCOUNTER — IMMUNIZATION (OUTPATIENT)
Dept: NURSING | Facility: CLINIC | Age: 26
End: 2021-02-02
Payer: COMMERCIAL

## 2021-02-02 ENCOUNTER — OFFICE VISIT (OUTPATIENT)
Dept: FAMILY MEDICINE | Facility: CLINIC | Age: 26
End: 2021-02-02
Payer: COMMERCIAL

## 2021-02-02 VITALS
DIASTOLIC BLOOD PRESSURE: 70 MMHG | HEART RATE: 80 BPM | BODY MASS INDEX: 28.08 KG/M2 | WEIGHT: 182 LBS | TEMPERATURE: 97.5 F | SYSTOLIC BLOOD PRESSURE: 124 MMHG

## 2021-02-02 DIAGNOSIS — F90.9 ATTENTION DEFICIT HYPERACTIVITY DISORDER (ADHD), UNSPECIFIED ADHD TYPE: ICD-10-CM

## 2021-02-02 DIAGNOSIS — F33.1 MAJOR DEPRESSIVE DISORDER, RECURRENT EPISODE, MODERATE (H): Primary | ICD-10-CM

## 2021-02-02 PROCEDURE — 0001A PR COVID VAC PFIZER DIL RECON 30 MCG/0.3 ML IM: CPT

## 2021-02-02 PROCEDURE — 99214 OFFICE O/P EST MOD 30 MIN: CPT | Performed by: PHYSICIAN ASSISTANT

## 2021-02-02 PROCEDURE — 91300 PR COVID VAC PFIZER DIL RECON 30 MCG/0.3 ML IM: CPT

## 2021-02-02 RX ORDER — DEXTROAMPHETAMINE SACCHARATE, AMPHETAMINE ASPARTATE MONOHYDRATE, DEXTROAMPHETAMINE SULFATE AND AMPHETAMINE SULFATE 5; 5; 5; 5 MG/1; MG/1; MG/1; MG/1
20 CAPSULE, EXTENDED RELEASE ORAL DAILY
Qty: 30 CAPSULE | Refills: 0 | Status: SHIPPED | OUTPATIENT
Start: 2021-02-02 | End: 2021-07-07

## 2021-02-02 RX ORDER — TRAZODONE HYDROCHLORIDE 50 MG/1
50 TABLET, FILM COATED ORAL AT BEDTIME
Qty: 30 TABLET | Refills: 1 | Status: SHIPPED | OUTPATIENT
Start: 2021-02-02 | End: 2021-03-10

## 2021-02-02 NOTE — PATIENT INSTRUCTIONS
Add trazodone at night  Can increase to 2 if not too tired in am   Increase adderall to 20mg in am

## 2021-02-02 NOTE — PROGRESS NOTES
Assessment & Plan     Major depressive disorder, recurrent episode, moderate (H)  Add trazodone for sleep   - traZODone (DESYREL) 50 MG tablet; Take 1 tablet (50 mg) by mouth At Bedtime    Attention deficit hyperactivity disorder (ADHD), unspecified ADHD type  Increase to 20mg daily.    - amphetamine-dextroamphetamine (ADDERALL XR) 20 MG 24 hr capsule; Take 1 capsule (20 mg) by mouth daily                  {Provider  Link to [facility] Help Grid :345344}           Return in about 4 weeks (around 3/2/2021) for adhd.    Elen Box PA-C  [facility] GRETA Wisdom is a 25 year old who presents to clinic today for the following health issues     HPI       Depression and Anxiety Follow-Up    How are you doing with your depression since your last visit? Improved     How are you doing with your anxiety since your last visit?  Improved     Are you having other symptoms that might be associated with depression or anxiety? No    Have you had a significant life event? No     Do you have any concerns with your use of alcohol or other drugs? No     Does run out early afternoon.  Reading better and head seems clearer.  Finishing tasks.  No trouble with sleep or appetite.  No side effects.      Mood is ok but still not trouble sleeping.  Has trouble staying asleep.      Social History     Tobacco Use     Smoking status: Never Smoker     Smokeless tobacco: Never Used   Substance Use Topics     Alcohol use: Yes     Comment: rarely     Drug use: Yes     Types: Marijuana     Comment: carol     PHQ 10/12/2020 12/2/2020 1/13/2021   PHQ-9 Total Score 9 18 11   Q9: Thoughts of better off dead/self-harm past 2 weeks Not at all Not at all Not at all     KATY-7 SCORE 10/12/2020 12/2/2020 1/13/2021   Total Score 15 13 7     Last PHQ-9 1/13/2021   1.  Little interest or pleasure in doing things 2   2.  Feeling down, depressed, or hopeless 0   3.  Trouble falling or staying asleep, or sleeping too much  3   4.  Feeling tired or having little energy 2   5.  Poor appetite or overeating 1   6.  Feeling bad about yourself 0   7.  Trouble concentrating 3   8.  Moving slowly or restless 0   Q9: Thoughts of better off dead/self-harm past 2 weeks 0   PHQ-9 Total Score 11   Difficulty at work, home, or with people -     KATY-7  1/13/2021   1. Feeling nervous, anxious, or on edge 1   2. Not being able to stop or control worrying 1   3. Worrying too much about different things 0   4. Trouble relaxing 1   5. Being so restless that it is hard to sit still 2   6. Becoming easily annoyed or irritable 1   7. Feeling afraid, as if something awful might happen 1   KATY-7 Total Score 7   If you checked any problems, how difficult have they made it for you to do your work, take care of things at home, or get along with other people? -       Suicide Assessment Five-step Evaluation and Treatment (SAFE-T)      How many servings of fruits and vegetables do you eat daily?  0-1    On average, how many sweetened beverages do you drink each day (Examples: soda, juice, sweet tea, etc.  Do NOT count diet or artificially sweetened beverages)?   2-3    How many days per week do you exercise enough to make your heart beat faster? sometimes    How many minutes a day do you exercise enough to make your heart beat faster? none    How many days per week do you miss taking your medication? 0    Medication Followup of ADDERALL     Taking Medication as prescribed: yes    Side Effects:  None    Medication Helping Symptoms:  yes         Review of Systems   As above      Objective    /70   Pulse 80   Temp 97.5  F (36.4  C) (Tympanic)   Wt 82.6 kg (182 lb)   BMI 28.08 kg/m    Body mass index is 28.08 kg/m .  Physical Exam  Constitutional:       General: He is not in acute distress.  Cardiovascular:      Rate and Rhythm: Normal rate and regular rhythm.   Pulmonary:      Effort: Pulmonary effort is normal.      Breath sounds: Normal breath sounds.    Neurological:      Mental Status: He is alert.   Psychiatric:         Mood and Affect: Mood normal.

## 2021-02-03 ASSESSMENT — ANXIETY QUESTIONNAIRES
3. WORRYING TOO MUCH ABOUT DIFFERENT THINGS: NOT AT ALL
1. FEELING NERVOUS, ANXIOUS, OR ON EDGE: NOT AT ALL
IF YOU CHECKED OFF ANY PROBLEMS ON THIS QUESTIONNAIRE, HOW DIFFICULT HAVE THESE PROBLEMS MADE IT FOR YOU TO DO YOUR WORK, TAKE CARE OF THINGS AT HOME, OR GET ALONG WITH OTHER PEOPLE: NOT DIFFICULT AT ALL
7. FEELING AFRAID AS IF SOMETHING AWFUL MIGHT HAPPEN: NOT AT ALL
5. BEING SO RESTLESS THAT IT IS HARD TO SIT STILL: NOT AT ALL
2. NOT BEING ABLE TO STOP OR CONTROL WORRYING: NOT AT ALL
GAD7 TOTAL SCORE: 1
6. BECOMING EASILY ANNOYED OR IRRITABLE: NOT AT ALL

## 2021-02-03 ASSESSMENT — PATIENT HEALTH QUESTIONNAIRE - PHQ9
SUM OF ALL RESPONSES TO PHQ QUESTIONS 1-9: 7
5. POOR APPETITE OR OVEREATING: SEVERAL DAYS

## 2021-02-04 ENCOUNTER — VIRTUAL VISIT (OUTPATIENT)
Dept: PSYCHOLOGY | Facility: CLINIC | Age: 26
End: 2021-02-04
Payer: COMMERCIAL

## 2021-02-04 DIAGNOSIS — F90.0 ATTENTION DEFICIT HYPERACTIVITY DISORDER, INATTENTIVE TYPE, MODERATE: Primary | ICD-10-CM

## 2021-02-04 PROCEDURE — 98966 PH1 ASSMT&MGMT NQHP 5-10: CPT | Mod: 95 | Performed by: PSYCHOLOGIST

## 2021-02-04 ASSESSMENT — ANXIETY QUESTIONNAIRES: GAD7 TOTAL SCORE: 1

## 2021-02-04 NOTE — PROGRESS NOTES
"                                             Progress Note    Patient Name: Artis Chou  Date: 2021       Service Type: Individual      Session Start Time: 8:00am  Session End Time:  8:05am     Session Length: 5 minutes    Session #: 4    Attendees: Client attended alone    Service Modality:  Phone Visit:      Provider verified identity through the following process.  Patient provided:  Patient     The patient has been notified of the following:      \"We have found that certain health care needs can be provided without the need for a face to face visit.  This service lets us provide the care you need with a phone conversation.       I will have full access to your [facility] medical record during this entire phone call.   I will be taking notes for your medical record.      Since this is like an office visit, we will bill your insurance company for this service.       There are potential benefits and risks of telephone visits (e.g. limits to patient confidentiality) that differ from in-person visits.?Confidentiality still applies for telephone services, and nobody will record the visit.  It is important to be in a quiet, private space that is free of distractions (including cell phone or other devices) during the visit.??      If during the course of the call I believe a telephone visit is not appropriate, you will not be charged for this service\"     Consent has been obtained for this service by care team member: Yes        PHQ 2020 2021 2/3/2021   PHQ-9 Total Score 18 11 7   Q9: Thoughts of better off dead/self-harm past 2 weeks Not at all Not at all Not at all       KATY-7 SCORE 2020 2021 2/3/2021   Total Score 13 7 1         DATA  Interactive Complexity: No  Crisis: No       Progress Since Last Session (Related to Symptoms / Goals / Homework):   Symptoms: Improving with medication adjustments.    Homework: N/A.      Current / Ongoing Stressors and " Concerns:   Adjustment of stimulant medication to address ADHD symptoms.      Treatment Objective(s) Addressed in This Session:   Address concerns regarding recommendations provided following ADHD testing.      Intervention:   Patient indicated that he did not have much time, as he was heading to work. Discussed that ADHD medication has been helpful, and he and his doctor recently agreed to increase the dose. He reported no concerns with the other recommendations provided, and symptoms have been improving. Patient had no further questions and was made aware that if issues arise, he may schedule another appointment with this provider.   MI: open ended questions, affirmations, reflections       ASSESSMENT: Current Emotional / Mental Status (status of significant symptoms):   Risk status (Self / Other harm or suicidal ideation)   Patient denies current fears or concerns for personal safety.   Patient denies current or recent suicidal ideation or behaviors.   Patient denies current or recent homicidal ideation or behaviors.   Patient denies current or recent self injurious behavior or ideation.   Patient denies other safety concerns.   Patient reports there has been no change in risk factors since his last session.     Patient reports there has been no change in protective factors since his last session.     Recommended that patient call 911 or go to the local ED should there be a change in any of these risk factors.     Appearance:   unable to assess - phone appointment    Eye Contact:   unable to assess - phone appointment    Psychomotor Behavior: unable to assess - phone appointment    Attitude:   Cooperative    Orientation:   All   Speech    Rate / Production: Normal     Volume:  Normal    Mood:    Normal   Affect:    unable to assess - phone appointment    Thought Content:  Clear    Thought Form:  Coherent  Logical    Insight:    Good      Medication Review:   Changes to psychiatric medications, see updated  Medication List in EPIC.      Medication Compliance:   Yes     Changes in Health Issues:   None reported     Chemical Use Review:   Did not review substance use     Diagnosis:  1. Attention deficit hyperactivity disorder, inattentive type, moderate        Collateral Reports Completed:   Not Applicable      PLAN:   Patient will continue with updated medication and contact this provider if necessary to discuss recommendations. No further appointments scheduled.      Lluvia Kemp PsyD, LP  Clinical Psychologist    Note reviewed and clinical supervision provided by Nicole Pineda, PhD LP 2/4/21

## 2021-02-21 DIAGNOSIS — F41.1 GAD (GENERALIZED ANXIETY DISORDER): ICD-10-CM

## 2021-02-21 DIAGNOSIS — F33.1 MAJOR DEPRESSIVE DISORDER, RECURRENT EPISODE, MODERATE (H): ICD-10-CM

## 2021-02-23 ENCOUNTER — IMMUNIZATION (OUTPATIENT)
Dept: NURSING | Facility: CLINIC | Age: 26
End: 2021-02-23
Attending: FAMILY MEDICINE
Payer: COMMERCIAL

## 2021-02-23 PROCEDURE — 91300 PR COVID VAC PFIZER DIL RECON 30 MCG/0.3 ML IM: CPT

## 2021-02-23 PROCEDURE — 0002A PR COVID VAC PFIZER DIL RECON 30 MCG/0.3 ML IM: CPT

## 2021-02-23 RX ORDER — SERTRALINE HYDROCHLORIDE 100 MG/1
TABLET, FILM COATED ORAL
Qty: 124 TABLET | Refills: 0 | Status: SHIPPED | OUTPATIENT
Start: 2021-02-23 | End: 2021-04-01

## 2021-02-25 DIAGNOSIS — F33.1 MAJOR DEPRESSIVE DISORDER, RECURRENT EPISODE, MODERATE (H): ICD-10-CM

## 2021-02-25 RX ORDER — TRAZODONE HYDROCHLORIDE 50 MG/1
TABLET, FILM COATED ORAL
Qty: 30 TABLET | Refills: 1 | OUTPATIENT
Start: 2021-02-25

## 2021-03-10 ENCOUNTER — VIRTUAL VISIT (OUTPATIENT)
Dept: FAMILY MEDICINE | Facility: CLINIC | Age: 26
End: 2021-03-10
Payer: COMMERCIAL

## 2021-03-10 DIAGNOSIS — F33.1 MAJOR DEPRESSIVE DISORDER, RECURRENT EPISODE, MODERATE (H): ICD-10-CM

## 2021-03-10 DIAGNOSIS — F90.0 ATTENTION DEFICIT HYPERACTIVITY DISORDER, INATTENTIVE TYPE, MODERATE: Primary | ICD-10-CM

## 2021-03-10 PROCEDURE — 99214 OFFICE O/P EST MOD 30 MIN: CPT | Mod: 95 | Performed by: PHYSICIAN ASSISTANT

## 2021-03-10 RX ORDER — DEXTROAMPHETAMINE SACCHARATE, AMPHETAMINE ASPARTATE MONOHYDRATE, DEXTROAMPHETAMINE SULFATE AND AMPHETAMINE SULFATE 5; 5; 5; 5 MG/1; MG/1; MG/1; MG/1
20 CAPSULE, EXTENDED RELEASE ORAL DAILY
Qty: 30 CAPSULE | Refills: 0 | Status: SHIPPED | OUTPATIENT
Start: 2021-03-10 | End: 2021-04-09

## 2021-03-10 RX ORDER — DEXTROAMPHETAMINE SACCHARATE, AMPHETAMINE ASPARTATE MONOHYDRATE, DEXTROAMPHETAMINE SULFATE AND AMPHETAMINE SULFATE 5; 5; 5; 5 MG/1; MG/1; MG/1; MG/1
20 CAPSULE, EXTENDED RELEASE ORAL DAILY
Qty: 30 CAPSULE | Refills: 0 | Status: SHIPPED | OUTPATIENT
Start: 2021-05-11 | End: 2021-06-10

## 2021-03-10 RX ORDER — TRAZODONE HYDROCHLORIDE 50 MG/1
50 TABLET, FILM COATED ORAL AT BEDTIME
Qty: 30 TABLET | Refills: 1 | Status: SHIPPED | OUTPATIENT
Start: 2021-03-10 | End: 2021-06-22

## 2021-03-10 RX ORDER — DEXTROAMPHETAMINE SACCHARATE, AMPHETAMINE ASPARTATE MONOHYDRATE, DEXTROAMPHETAMINE SULFATE AND AMPHETAMINE SULFATE 5; 5; 5; 5 MG/1; MG/1; MG/1; MG/1
20 CAPSULE, EXTENDED RELEASE ORAL DAILY
Qty: 30 CAPSULE | Refills: 0 | Status: SHIPPED | OUTPATIENT
Start: 2021-04-10 | End: 2021-05-10

## 2021-03-10 RX ORDER — DEXTROAMPHETAMINE SACCHARATE, AMPHETAMINE ASPARTATE, DEXTROAMPHETAMINE SULFATE AND AMPHETAMINE SULFATE 1.25; 1.25; 1.25; 1.25 MG/1; MG/1; MG/1; MG/1
5 TABLET ORAL DAILY
Qty: 30 TABLET | Refills: 0 | Status: SHIPPED | OUTPATIENT
Start: 2021-03-10 | End: 2021-04-27

## 2021-03-10 NOTE — PROGRESS NOTES
"Artis is a 26 year old who is being evaluated via a billable telephone visit.    4:02-4:08  What phone number would you like to be contacted at? [phone]   How would you like to obtain your AVS? MyChart    Assessment & Plan     Major depressive disorder, recurrent episode, moderate (H)  Doing well.    - traZODone (DESYREL) 50 MG tablet; Take 1 tablet (50 mg) by mouth At Bedtime    Attention deficit hyperactivity disorder, inattentive type, moderate  Add a short acting in the afternoon   - amphetamine-dextroamphetamine (ADDERALL) 5 MG tablet; Take 1 tablet (5 mg) by mouth daily  - amphetamine-dextroamphetamine (ADDERALL XR) 20 MG 24 hr capsule; Take 1 capsule (20 mg) by mouth daily  - amphetamine-dextroamphetamine (ADDERALL XR) 20 MG 24 hr capsule; Take 1 capsule (20 mg) by mouth daily  - amphetamine-dextroamphetamine (ADDERALL XR) 20 MG 24 hr capsule; Take 1 capsule (20 mg) by mouth daily             BMI:   Estimated body mass index is 28.08 kg/m  as calculated from the following:    Height as of 1/12/21: 1.715 m (5' 7.5\").    Weight as of 2/2/21: 82.6 kg (182 lb).           Return in about 3 weeks (around 3/31/2021) for adhd.    Elen Box PA-C  [facility]    Jhon Wisdom is a 26 year old who presents for the following health issues     HPI       Medication Followup of  Adderall     Taking Medication as prescribed: yes    Side Effects:  None    Medication Helping Symptoms:  Yes    About 1 or 2 noticing that medication is wearing off.  Not able to finish tasks in the afternoon.     Mood is good too.    No tremors, palpitations or headaches.  Weight stable.            Review of Systems   As above      Objective           Vitals:  No vitals were obtained today due to virtual visit.    Physical Exam   healthy, alert and no distress  PSYCH: Alert and oriented times 3; coherent speech, normal   rate and volume, able to articulate logical thoughts, able   to abstract " reason, no tangential thoughts, no hallucinations   or delusions  His affect is normal  RESP: No cough, no audible wheezing, able to talk in full sentences  Remainder of exam unable to be completed due to telephone visits                Phone call duration: 6 minutes

## 2021-03-22 ENCOUNTER — MYC REFILL (OUTPATIENT)
Dept: FAMILY MEDICINE | Facility: CLINIC | Age: 26
End: 2021-03-22

## 2021-03-22 DIAGNOSIS — K60.2 ANAL FISSURE: ICD-10-CM

## 2021-04-01 ENCOUNTER — MYC REFILL (OUTPATIENT)
Dept: FAMILY MEDICINE | Facility: CLINIC | Age: 26
End: 2021-04-01

## 2021-04-01 DIAGNOSIS — F41.1 GAD (GENERALIZED ANXIETY DISORDER): ICD-10-CM

## 2021-04-01 DIAGNOSIS — F33.1 MAJOR DEPRESSIVE DISORDER, RECURRENT EPISODE, MODERATE (H): ICD-10-CM

## 2021-04-01 RX ORDER — SERTRALINE HYDROCHLORIDE 100 MG/1
TABLET, FILM COATED ORAL
Qty: 135 TABLET | Refills: 0 | Status: SHIPPED | OUTPATIENT
Start: 2021-04-01 | End: 2021-04-27

## 2021-04-11 ENCOUNTER — HEALTH MAINTENANCE LETTER (OUTPATIENT)
Age: 26
End: 2021-04-11

## 2021-05-01 ENCOUNTER — MYC REFILL (OUTPATIENT)
Dept: FAMILY MEDICINE | Facility: CLINIC | Age: 26
End: 2021-05-01

## 2021-05-01 DIAGNOSIS — F33.1 MAJOR DEPRESSIVE DISORDER, RECURRENT EPISODE, MODERATE (H): ICD-10-CM

## 2021-05-01 DIAGNOSIS — F41.1 GAD (GENERALIZED ANXIETY DISORDER): ICD-10-CM

## 2021-05-03 ENCOUNTER — MYC REFILL (OUTPATIENT)
Dept: FAMILY MEDICINE | Facility: CLINIC | Age: 26
End: 2021-05-03

## 2021-05-03 DIAGNOSIS — F90.0 ATTENTION DEFICIT HYPERACTIVITY DISORDER, INATTENTIVE TYPE, MODERATE: ICD-10-CM

## 2021-05-03 RX ORDER — DEXTROAMPHETAMINE SACCHARATE, AMPHETAMINE ASPARTATE, DEXTROAMPHETAMINE SULFATE AND AMPHETAMINE SULFATE 1.25; 1.25; 1.25; 1.25 MG/1; MG/1; MG/1; MG/1
5 TABLET ORAL DAILY
Qty: 30 TABLET | Refills: 0 | Status: CANCELLED | OUTPATIENT
Start: 2021-05-03

## 2021-05-03 RX ORDER — SERTRALINE HYDROCHLORIDE 100 MG/1
TABLET, FILM COATED ORAL
Qty: 45 TABLET | Refills: 0 | Status: SHIPPED | OUTPATIENT
Start: 2021-05-03 | End: 2021-06-11

## 2021-05-03 NOTE — TELEPHONE ENCOUNTER
Routing refill request to provider for review/approval because:  PHQ    PHQ-9 score:    PHQ 4/27/2021   PHQ-9 Total Score 9   Q9: Thoughts of better off dead/self-harm past 2 weeks Not at all                     Pending Prescriptions:                       Disp   Refills    sertraline (ZOLOFT) 100 MG tablet          45 tab*0        Sig: TAKE 1 AND 1/2 TABLETS BY MOUTH EVERY DAY        Hunter Raomn RN

## 2021-05-04 NOTE — TELEPHONE ENCOUNTER
Refused refill as patient has refills available. Refilled on 3/10/21 for 30 day supply with 3 refills. Patient should have one refill left. [employer] message sent to patient.    Isabel Blackwell RN, BSN  [facility]

## 2021-05-15 ENCOUNTER — MYC REFILL (OUTPATIENT)
Dept: FAMILY MEDICINE | Facility: CLINIC | Age: 26
End: 2021-05-15

## 2021-05-15 DIAGNOSIS — F90.0 ATTENTION DEFICIT HYPERACTIVITY DISORDER, INATTENTIVE TYPE, MODERATE: ICD-10-CM

## 2021-05-15 RX ORDER — DEXTROAMPHETAMINE SACCHARATE, AMPHETAMINE ASPARTATE MONOHYDRATE, DEXTROAMPHETAMINE SULFATE AND AMPHETAMINE SULFATE 5; 5; 5; 5 MG/1; MG/1; MG/1; MG/1
20 CAPSULE, EXTENDED RELEASE ORAL DAILY
Qty: 30 CAPSULE | Refills: 0 | Status: CANCELLED | OUTPATIENT
Start: 2021-05-15

## 2021-05-17 NOTE — TELEPHONE ENCOUNTER
Routing refill request to provider for review/approval because:  Drug not on the [facility] refill protocol     Requested Prescriptions   Pending Prescriptions Disp Refills     amphetamine-dextroamphetamine (ADDERALL XR) 20 MG 24 hr capsule 30 capsule 0     Sig: Take 1 capsule (20 mg) by mouth daily       There is no refill protocol information for this order

## 2021-05-26 ENCOUNTER — MYC REFILL (OUTPATIENT)
Dept: FAMILY MEDICINE | Facility: CLINIC | Age: 26
End: 2021-05-26

## 2021-05-26 DIAGNOSIS — K60.2 ANAL FISSURE: ICD-10-CM

## 2021-05-27 NOTE — TELEPHONE ENCOUNTER
Routing refill request to provider for review/approval because:  Drug not on the [facility] refill protocol           Pending Prescriptions:                       Disp   Refills    nifedipine 0.2% in white petrolatum 0.2 % *100 g  0        Sig: Apply topically 2 times daily        Hunter Ramon RN

## 2021-06-09 DIAGNOSIS — F33.1 MAJOR DEPRESSIVE DISORDER, RECURRENT EPISODE, MODERATE (H): ICD-10-CM

## 2021-06-09 DIAGNOSIS — F41.1 GAD (GENERALIZED ANXIETY DISORDER): ICD-10-CM

## 2021-06-11 RX ORDER — SERTRALINE HYDROCHLORIDE 100 MG/1
TABLET, FILM COATED ORAL
Qty: 45 TABLET | Refills: 0 | Status: SHIPPED | OUTPATIENT
Start: 2021-06-11 | End: 2021-11-16

## 2021-06-11 NOTE — TELEPHONE ENCOUNTER
Left message for patient to call back. Please assist with scheduling.   [id number], MEDICAL ASSISTANT

## 2021-06-11 NOTE — TELEPHONE ENCOUNTER
"Routing refill request to provider for review/approval because:  PHQ-9 out of range      PHQ 1/13/2021 2/3/2021 4/27/2021   PHQ-9 Total Score 11 7 9   Q9: Thoughts of better off dead/self-harm past 2 weeks Not at all Not at all Not at all         Requested Prescriptions   Pending Prescriptions Disp Refills     sertraline (ZOLOFT) 100 MG tablet [Pharmacy Med Name: SERTRALINE  MG TABLET] 45 tablet 2     Sig: TAKE 1 AND 1/2 TABLETS BY MOUTH EVERY DAY       SSRIs Protocol Failed - 6/9/2021  9:32 AM        Failed - PHQ-9 score less than 5 in past 6 months     Please review last PHQ-9 score.           Passed - Medication is active on med list        Passed - Patient is age 18 or older        Passed - Recent (6 mo) or future (30 days) visit within the authorizing provider's specialty     Patient had office visit in the last 6 months or has a visit in the next 30 days with authorizing provider or within the authorizing provider's specialty.  See \"Patient Info\" tab in inbasket, or \"Choose Columns\" in Meds & Orders section of the refill encounter.                 "

## 2021-06-21 DIAGNOSIS — F33.1 MAJOR DEPRESSIVE DISORDER, RECURRENT EPISODE, MODERATE (H): ICD-10-CM

## 2021-06-22 RX ORDER — TRAZODONE HYDROCHLORIDE 50 MG/1
TABLET, FILM COATED ORAL
Qty: 30 TABLET | Refills: 1 | Status: SHIPPED | OUTPATIENT
Start: 2021-06-22 | End: 2021-07-07 | Stop reason: DRUGHIGH

## 2021-06-22 NOTE — TELEPHONE ENCOUNTER
Routing refill request to provider for review/approval because:  Drug interaction warning          Pending Prescriptions:                       Disp   Refills    traZODone (DESYREL) 50 MG tablet [Pharmacy*30 tab*1        Sig: TAKE 1 TABLET BY MOUTH AT BEDTIME        Hunter Ramon RN

## 2021-06-27 ENCOUNTER — MYC REFILL (OUTPATIENT)
Dept: FAMILY MEDICINE | Facility: CLINIC | Age: 26
End: 2021-06-27

## 2021-06-27 DIAGNOSIS — F90.9 ATTENTION DEFICIT HYPERACTIVITY DISORDER (ADHD), UNSPECIFIED ADHD TYPE: ICD-10-CM

## 2021-07-01 RX ORDER — DEXTROAMPHETAMINE SACCHARATE, AMPHETAMINE ASPARTATE MONOHYDRATE, DEXTROAMPHETAMINE SULFATE AND AMPHETAMINE SULFATE 5; 5; 5; 5 MG/1; MG/1; MG/1; MG/1
20 CAPSULE, EXTENDED RELEASE ORAL DAILY
Start: 2021-07-01

## 2021-07-01 NOTE — TELEPHONE ENCOUNTER
Controlled Substance Refill Request for amphetamine-dextroamphetamine (ADDERALL XR) 20 MG 24 hr capsule  Problem List Complete:  No     PROVIDER TO CONSIDER COMPLETION OF PROBLEM LIST AND OVERVIEW/CONTROLLED SUBSTANCE AGREEMENT    Last Written Prescription Date:  2/2/2021  Last Fill Quantity: 30,   # refills: 0    THE MOST RECENT OFFICE VISIT MUST BE WITHIN THE PAST 3 MONTHS. AT LEAST ONE FACE TO FACE VISIT MUST OCCUR EVERY 6 MONTHS. ADDITIONAL VISITS CAN BE VIRTUAL.  (THIS STATEMENT SHOULD BE DELETED.)    Last Office Visit with [facility] primary care provider: 3/10/2021    Future Office visit:     Controlled substance agreement:   Encounter-Level CSA:    There are no encounter-level csa.     Patient-Level CSA:    There are no patient-level csa.         Last Urine Drug Screen: No results found for: CDAUT, No results found for: COMDAT, No results found for: THC13, PCP13, COC13, MAMP13, OPI13, AMP13, BZO13, TCA13, MTD13, BAR13, OXY13, PPX13, BUP13     Processing:  Rx to be electronically transmitted to pharmacy by provider      [email or web address]       checked in past 3 months?  No, route to RN

## 2021-07-07 ENCOUNTER — OFFICE VISIT (OUTPATIENT)
Dept: FAMILY MEDICINE | Facility: CLINIC | Age: 26
End: 2021-07-07
Payer: COMMERCIAL

## 2021-07-07 VITALS
HEART RATE: 58 BPM | TEMPERATURE: 96.9 F | BODY MASS INDEX: 30.86 KG/M2 | SYSTOLIC BLOOD PRESSURE: 139 MMHG | DIASTOLIC BLOOD PRESSURE: 79 MMHG | WEIGHT: 200 LBS

## 2021-07-07 DIAGNOSIS — Z11.4 SCREENING FOR HIV (HUMAN IMMUNODEFICIENCY VIRUS): ICD-10-CM

## 2021-07-07 DIAGNOSIS — F41.1 GAD (GENERALIZED ANXIETY DISORDER): Primary | ICD-10-CM

## 2021-07-07 DIAGNOSIS — F33.1 MAJOR DEPRESSIVE DISORDER, RECURRENT EPISODE, MODERATE (H): ICD-10-CM

## 2021-07-07 DIAGNOSIS — F90.0 ATTENTION DEFICIT HYPERACTIVITY DISORDER, INATTENTIVE TYPE, MODERATE: ICD-10-CM

## 2021-07-07 DIAGNOSIS — Z11.59 NEED FOR HEPATITIS C SCREENING TEST: ICD-10-CM

## 2021-07-07 PROCEDURE — 86803 HEPATITIS C AB TEST: CPT | Performed by: PHYSICIAN ASSISTANT

## 2021-07-07 PROCEDURE — 99214 OFFICE O/P EST MOD 30 MIN: CPT | Performed by: PHYSICIAN ASSISTANT

## 2021-07-07 PROCEDURE — 87389 HIV-1 AG W/HIV-1&-2 AB AG IA: CPT | Performed by: PHYSICIAN ASSISTANT

## 2021-07-07 PROCEDURE — 36415 COLL VENOUS BLD VENIPUNCTURE: CPT | Performed by: PHYSICIAN ASSISTANT

## 2021-07-07 RX ORDER — DEXTROAMPHETAMINE SACCHARATE, AMPHETAMINE ASPARTATE MONOHYDRATE, DEXTROAMPHETAMINE SULFATE AND AMPHETAMINE SULFATE 5; 5; 5; 5 MG/1; MG/1; MG/1; MG/1
20 CAPSULE, EXTENDED RELEASE ORAL DAILY
Qty: 30 CAPSULE | Refills: 0 | Status: SHIPPED | OUTPATIENT
Start: 2021-09-07 | End: 2021-10-07

## 2021-07-07 RX ORDER — DEXTROAMPHETAMINE SACCHARATE, AMPHETAMINE ASPARTATE, DEXTROAMPHETAMINE SULFATE AND AMPHETAMINE SULFATE 1.25; 1.25; 1.25; 1.25 MG/1; MG/1; MG/1; MG/1
5 TABLET ORAL DAILY
Qty: 30 TABLET | Refills: 0 | Status: SHIPPED | OUTPATIENT
Start: 2021-09-07 | End: 2021-10-07

## 2021-07-07 RX ORDER — TRAZODONE HYDROCHLORIDE 100 MG/1
TABLET ORAL
Qty: 45 TABLET | Refills: 1 | Status: SHIPPED | OUTPATIENT
Start: 2021-07-07 | End: 2021-07-30

## 2021-07-07 RX ORDER — DEXTROAMPHETAMINE SACCHARATE, AMPHETAMINE ASPARTATE MONOHYDRATE, DEXTROAMPHETAMINE SULFATE AND AMPHETAMINE SULFATE 5; 5; 5; 5 MG/1; MG/1; MG/1; MG/1
20 CAPSULE, EXTENDED RELEASE ORAL DAILY
Qty: 30 CAPSULE | Refills: 0 | Status: SHIPPED | OUTPATIENT
Start: 2021-07-07 | End: 2021-08-06

## 2021-07-07 RX ORDER — DEXTROAMPHETAMINE SACCHARATE, AMPHETAMINE ASPARTATE, DEXTROAMPHETAMINE SULFATE AND AMPHETAMINE SULFATE 1.25; 1.25; 1.25; 1.25 MG/1; MG/1; MG/1; MG/1
5 TABLET ORAL DAILY
Qty: 30 TABLET | Refills: 0 | Status: SHIPPED | OUTPATIENT
Start: 2021-07-07 | End: 2021-08-06

## 2021-07-07 RX ORDER — DEXTROAMPHETAMINE SACCHARATE, AMPHETAMINE ASPARTATE MONOHYDRATE, DEXTROAMPHETAMINE SULFATE AND AMPHETAMINE SULFATE 5; 5; 5; 5 MG/1; MG/1; MG/1; MG/1
20 CAPSULE, EXTENDED RELEASE ORAL DAILY
Qty: 30 CAPSULE | Refills: 0 | Status: SHIPPED | OUTPATIENT
Start: 2021-08-07 | End: 2021-09-06

## 2021-07-07 RX ORDER — DEXTROAMPHETAMINE SACCHARATE, AMPHETAMINE ASPARTATE, DEXTROAMPHETAMINE SULFATE AND AMPHETAMINE SULFATE 1.25; 1.25; 1.25; 1.25 MG/1; MG/1; MG/1; MG/1
5 TABLET ORAL DAILY
Qty: 30 TABLET | Refills: 0 | Status: SHIPPED | OUTPATIENT
Start: 2021-08-07 | End: 2021-09-06

## 2021-07-07 NOTE — PROGRESS NOTES
"    Assessment & Plan     Screening for HIV (human immunodeficiency virus)    - HIV Antigen Antibody Combo    Need for hepatitis C screening test    - Hepatitis C Screen Reflex to HCV RNA Quant and Genotype    KATY (generalized anxiety disorder)  Stable.  No changes for now     Major depressive disorder, recurrent episode, moderate (H)  Ok to increase as needed  - traZODone (DESYREL) 100 MG tablet; 1 to 1.5 nightly as needed    Attention deficit hyperactivity disorder, inattentive type, moderate  Refilled.    - amphetamine-dextroamphetamine (ADDERALL XR) 20 MG 24 hr capsule; Take 1 capsule (20 mg) by mouth daily  - amphetamine-dextroamphetamine (ADDERALL XR) 20 MG 24 hr capsule; Take 1 capsule (20 mg) by mouth daily  - amphetamine-dextroamphetamine (ADDERALL XR) 20 MG 24 hr capsule; Take 1 capsule (20 mg) by mouth daily  - amphetamine-dextroamphetamine (ADDERALL) 5 MG tablet; Take 1 tablet (5 mg) by mouth daily  - amphetamine-dextroamphetamine (ADDERALL) 5 MG tablet; Take 1 tablet (5 mg) by mouth daily  - amphetamine-dextroamphetamine (ADDERALL) 5 MG tablet; Take 1 tablet (5 mg) by mouth daily             BMI:   Estimated body mass index is 30.86 kg/m  as calculated from the following:    Height as of 1/12/21: 1.715 m (5' 7.5\").    Weight as of this encounter: 90.7 kg (200 lb).           Return in about 6 months (around 1/7/2022) for adhd, mood.    NKECHI Kaur [facility] GRETA Wisdom is a 26 year old who presents for the following health issues     HPI     Medication Followup of ADDERALL     Taking Medication as prescribed: yes    Side Effects:  None    Medication Helping Symptoms:  Yes    Would like to continue through the summer when off work.  Will be going back next month.    No tremor or palpitations.    Trazodone helps with sleep if he takes 2.  Sleep is not affected by adderall.       zoloft is helping.           Review of Systems   As above      Objective  "   /79   Pulse 58   Temp 96.9  F (36.1  C) (Tympanic)   Wt 90.7 kg (200 lb)   BMI 30.86 kg/m    Body mass index is 30.86 kg/m .  Physical Exam  Constitutional:       General: He is not in acute distress.  Cardiovascular:      Rate and Rhythm: Normal rate and regular rhythm.   Pulmonary:      Effort: Pulmonary effort is normal.      Breath sounds: Normal breath sounds.   Neurological:      Mental Status: He is alert.   Psychiatric:         Mood and Affect: Mood normal.

## 2021-07-08 LAB
HCV AB SERPL QL IA: NONREACTIVE
HIV 1+2 AB+HIV1 P24 AG SERPL QL IA: NONREACTIVE

## 2021-07-30 DIAGNOSIS — F33.1 MAJOR DEPRESSIVE DISORDER, RECURRENT EPISODE, MODERATE (H): ICD-10-CM

## 2021-07-30 RX ORDER — TRAZODONE HYDROCHLORIDE 100 MG/1
TABLET ORAL
Qty: 45 TABLET | Refills: 1 | Status: SHIPPED | OUTPATIENT
Start: 2021-07-30 | End: 2021-09-21

## 2021-09-19 DIAGNOSIS — F33.1 MAJOR DEPRESSIVE DISORDER, RECURRENT EPISODE, MODERATE (H): ICD-10-CM

## 2021-09-21 RX ORDER — TRAZODONE HYDROCHLORIDE 100 MG/1
TABLET ORAL
Qty: 45 TABLET | Refills: 8 | Status: SHIPPED | OUTPATIENT
Start: 2021-09-21 | End: 2021-11-16 | Stop reason: SINTOL

## 2021-09-26 ENCOUNTER — HEALTH MAINTENANCE LETTER (OUTPATIENT)
Age: 26
End: 2021-09-26

## 2021-10-29 DIAGNOSIS — F33.1 MAJOR DEPRESSIVE DISORDER, RECURRENT EPISODE, MODERATE (H): ICD-10-CM

## 2021-10-29 DIAGNOSIS — F41.1 GAD (GENERALIZED ANXIETY DISORDER): ICD-10-CM

## 2021-10-31 NOTE — TELEPHONE ENCOUNTER
Please call to update PHQ-9  PHQ-9 score:    PHQ 4/27/2021   PHQ-9 Total Score 9   Q9: Thoughts of better off dead/self-harm past 2 weeks Not at all

## 2021-11-04 ENCOUNTER — MYC MEDICAL ADVICE (OUTPATIENT)
Dept: FAMILY MEDICINE | Facility: CLINIC | Age: 26
End: 2021-11-04
Payer: COMMERCIAL

## 2021-11-06 ASSESSMENT — PATIENT HEALTH QUESTIONNAIRE - PHQ9
10. IF YOU CHECKED OFF ANY PROBLEMS, HOW DIFFICULT HAVE THESE PROBLEMS MADE IT FOR YOU TO DO YOUR WORK, TAKE CARE OF THINGS AT HOME, OR GET ALONG WITH OTHER PEOPLE: SOMEWHAT DIFFICULT
SUM OF ALL RESPONSES TO PHQ QUESTIONS 1-9: 10
SUM OF ALL RESPONSES TO PHQ QUESTIONS 1-9: 10

## 2021-11-07 ASSESSMENT — PATIENT HEALTH QUESTIONNAIRE - PHQ9: SUM OF ALL RESPONSES TO PHQ QUESTIONS 1-9: 10

## 2021-11-14 ENCOUNTER — APPOINTMENT (OUTPATIENT)
Dept: URGENT CARE | Facility: CLINIC | Age: 26
End: 2021-11-14
Payer: COMMERCIAL

## 2021-11-16 ENCOUNTER — OFFICE VISIT (OUTPATIENT)
Dept: FAMILY MEDICINE | Facility: CLINIC | Age: 26
End: 2021-11-16
Payer: COMMERCIAL

## 2021-11-16 VITALS
DIASTOLIC BLOOD PRESSURE: 82 MMHG | TEMPERATURE: 98.8 F | WEIGHT: 189 LBS | BODY MASS INDEX: 29.16 KG/M2 | HEART RATE: 55 BPM | SYSTOLIC BLOOD PRESSURE: 139 MMHG

## 2021-11-16 DIAGNOSIS — F41.1 GAD (GENERALIZED ANXIETY DISORDER): ICD-10-CM

## 2021-11-16 DIAGNOSIS — Z00.00 ROUTINE HISTORY AND PHYSICAL EXAMINATION OF ADULT: Primary | ICD-10-CM

## 2021-11-16 DIAGNOSIS — F90.0 ATTENTION DEFICIT HYPERACTIVITY DISORDER, INATTENTIVE TYPE, MODERATE: ICD-10-CM

## 2021-11-16 DIAGNOSIS — Z23 HIGH PRIORITY FOR 2019-NCOV VACCINE: ICD-10-CM

## 2021-11-16 DIAGNOSIS — F33.1 MAJOR DEPRESSIVE DISORDER, RECURRENT EPISODE, MODERATE (H): ICD-10-CM

## 2021-11-16 PROCEDURE — 99213 OFFICE O/P EST LOW 20 MIN: CPT | Mod: 25 | Performed by: PHYSICIAN ASSISTANT

## 2021-11-16 PROCEDURE — 99395 PREV VISIT EST AGE 18-39: CPT | Mod: 25 | Performed by: PHYSICIAN ASSISTANT

## 2021-11-16 PROCEDURE — 0004A COVID-19,PF,PFIZER (12+ YRS): CPT | Performed by: PHYSICIAN ASSISTANT

## 2021-11-16 PROCEDURE — 91300 COVID-19,PF,PFIZER (12+ YRS): CPT | Performed by: PHYSICIAN ASSISTANT

## 2021-11-16 RX ORDER — DEXTROAMPHETAMINE SACCHARATE, AMPHETAMINE ASPARTATE, DEXTROAMPHETAMINE SULFATE AND AMPHETAMINE SULFATE 1.25; 1.25; 1.25; 1.25 MG/1; MG/1; MG/1; MG/1
5 TABLET ORAL DAILY
Qty: 30 TABLET | Refills: 0 | Status: SHIPPED | OUTPATIENT
Start: 2021-11-16 | End: 2021-12-16

## 2021-11-16 RX ORDER — GABAPENTIN 300 MG/1
300 CAPSULE ORAL AT BEDTIME
Qty: 30 CAPSULE | Refills: 1 | Status: SHIPPED | OUTPATIENT
Start: 2021-11-16 | End: 2022-01-11

## 2021-11-16 RX ORDER — DEXTROAMPHETAMINE SACCHARATE, AMPHETAMINE ASPARTATE MONOHYDRATE, DEXTROAMPHETAMINE SULFATE AND AMPHETAMINE SULFATE 5; 5; 5; 5 MG/1; MG/1; MG/1; MG/1
20 CAPSULE, EXTENDED RELEASE ORAL DAILY
Qty: 30 CAPSULE | Refills: 0 | Status: SHIPPED | OUTPATIENT
Start: 2021-12-17 | End: 2022-01-16

## 2021-11-16 RX ORDER — DEXTROAMPHETAMINE SACCHARATE, AMPHETAMINE ASPARTATE MONOHYDRATE, DEXTROAMPHETAMINE SULFATE AND AMPHETAMINE SULFATE 5; 5; 5; 5 MG/1; MG/1; MG/1; MG/1
20 CAPSULE, EXTENDED RELEASE ORAL DAILY
Qty: 30 CAPSULE | Refills: 0 | Status: SHIPPED | OUTPATIENT
Start: 2021-11-16 | End: 2021-12-16

## 2021-11-16 RX ORDER — SERTRALINE HYDROCHLORIDE 100 MG/1
TABLET, FILM COATED ORAL
Qty: 135 TABLET | Refills: 1 | Status: SHIPPED | OUTPATIENT
Start: 2021-11-16 | End: 2022-04-15

## 2021-11-16 RX ORDER — DEXTROAMPHETAMINE SACCHARATE, AMPHETAMINE ASPARTATE, DEXTROAMPHETAMINE SULFATE AND AMPHETAMINE SULFATE 1.25; 1.25; 1.25; 1.25 MG/1; MG/1; MG/1; MG/1
5 TABLET ORAL DAILY
Qty: 30 TABLET | Refills: 0 | Status: SHIPPED | OUTPATIENT
Start: 2022-01-17 | End: 2022-02-16

## 2021-11-16 RX ORDER — DEXTROAMPHETAMINE SACCHARATE, AMPHETAMINE ASPARTATE MONOHYDRATE, DEXTROAMPHETAMINE SULFATE AND AMPHETAMINE SULFATE 5; 5; 5; 5 MG/1; MG/1; MG/1; MG/1
20 CAPSULE, EXTENDED RELEASE ORAL DAILY
Qty: 30 CAPSULE | Refills: 0 | Status: SHIPPED | OUTPATIENT
Start: 2022-01-17 | End: 2022-02-16

## 2021-11-16 RX ORDER — DEXTROAMPHETAMINE SACCHARATE, AMPHETAMINE ASPARTATE, DEXTROAMPHETAMINE SULFATE AND AMPHETAMINE SULFATE 1.25; 1.25; 1.25; 1.25 MG/1; MG/1; MG/1; MG/1
5 TABLET ORAL DAILY
Qty: 30 TABLET | Refills: 0 | Status: SHIPPED | OUTPATIENT
Start: 2021-12-17 | End: 2022-01-16

## 2021-11-16 ASSESSMENT — ENCOUNTER SYMPTOMS
PALPITATIONS: 0
SHORTNESS OF BREATH: 0
CHILLS: 0
HEADACHES: 0
CONSTIPATION: 0
WEAKNESS: 0
NERVOUS/ANXIOUS: 0
COUGH: 0
FREQUENCY: 0
FEVER: 0
JOINT SWELLING: 0
DIARRHEA: 0
NAUSEA: 0
MYALGIAS: 0
PARESTHESIAS: 0
SORE THROAT: 0
HEMATURIA: 0
DYSURIA: 0
ABDOMINAL PAIN: 0
HEMATOCHEZIA: 0
HEARTBURN: 0
ARTHRALGIAS: 0
DIZZINESS: 0
EYE PAIN: 0

## 2021-11-16 NOTE — PROGRESS NOTES
SUBJECTIVE:   CC: Artis Chou is an 26 year old male who presents for preventative health visit.     {Split Bill scripting  The purpose of this visit is to discuss your medical history and prevent health problems before you are sick. You may be responsible for a co-pay, coinsurance, or deductible if your visit today includes services such as checking on a sore throat, having an x-ray or lab test, or treating and evaluating a new or existing condition :869694}  Patient has been advised of split billing requirements and indicates understanding: {Yes and No:588484}  Healthy Habits:     Getting at least 3 servings of Calcium per day:  NO    Bi-annual eye exam:  NO    Dental care twice a year:  NO    Sleep apnea or symptoms of sleep apnea:  Daytime drowsiness    Diet:  Regular (no restrictions)    Frequency of exercise:  None    Taking medications regularly:  Yes    Medication side effects:  None    PHQ-2 Total Score: 2    Additional concerns today:  Yes    {Add if <65 person on Medicare  - Required Questions (Optional):384282}  {Outside tests to abstract? :377160}    {additional problems to add (Optional):124135}    Today's PHQ-2 Score:   PHQ-2 ( 1999 Pfizer) 11/16/2021   Q1: Little interest or pleasure in doing things 1   Q2: Feeling down, depressed or hopeless 1   PHQ-2 Score 2   Q1: Little interest or pleasure in doing things Several days   Q2: Feeling down, depressed or hopeless Several days   PHQ-2 Score 2       Abuse: Current or Past(Physical, Sexual or Emotional)- { :982243}  Do you feel safe in your environment? { :415650}    Have you ever done Advance Care Planning? (For example, a Health Directive, POLST, or a discussion with a medical provider or your loved ones about your wishes): { :559867}    Social History     Tobacco Use     Smoking status: Never Smoker     Smokeless tobacco: Never Used   Substance Use Topics     Alcohol use: Yes     Comment: rarely     {Rooming Staff- Complete this question if  "Prescreen response is not shown below for today's visit. If you drink alcohol do you typically have >3 drinks per day or >7 drinks per week? (Optional):447785}    Alcohol Use 11/16/2021   Prescreen: >3 drinks/day or >7 drinks/week? No   {add AUDIT responses (Optional) (A score of 7 for adult men is an indication of hazardous drinking; a score of 8 or more is an indication of an alcohol use disorder.  A score of 7 or more for adult women is an indication of hazardous drinking or an alchohol use disorder):842360}    Last PSA: No results found for: PSA    Reviewed orders with patient. Reviewed health maintenance and updated orders accordingly - { :347033::\"Yes\"}  {Chronicprobdata (optional):854817}    Reviewed and updated as needed this visit by clinical staff                Reviewed and updated as needed this visit by Provider               {HISTORY OPTIONS (Optional):365094}    Review of Systems   Constitutional: Negative for chills and fever.   HENT: Negative for congestion, ear pain, hearing loss and sore throat.    Eyes: Positive for visual disturbance. Negative for pain.   Respiratory: Negative for cough and shortness of breath.    Cardiovascular: Negative for chest pain, palpitations and peripheral edema.   Gastrointestinal: Negative for abdominal pain, constipation, diarrhea, heartburn, hematochezia and nausea.   Genitourinary: Negative for dysuria, frequency, genital sores, hematuria, impotence, penile discharge and urgency.   Musculoskeletal: Negative for arthralgias, joint swelling and myalgias.   Skin: Negative for rash.   Neurological: Negative for dizziness, weakness, headaches and paresthesias.   Psychiatric/Behavioral: Positive for mood changes. The patient is not nervous/anxious.      {MALE ROS (Optional):518944::\"CONSTITUTIONAL: NEGATIVE for fever, chills, change in weight\",\"INTEGUMENTARY/SKIN: NEGATIVE for worrisome rashes, moles or lesions\",\"EYES: NEGATIVE for vision changes or irritation\",\"ENT: " "NEGATIVE for ear, mouth and throat problems\",\"RESP: NEGATIVE for significant cough or SOB\",\"CV: NEGATIVE for chest pain, palpitations or peripheral edema\",\"GI: NEGATIVE for nausea, abdominal pain, heartburn, or change in bowel habits\",\" male: negative for dysuria, hematuria, decreased urinary stream, erectile dysfunction, urethral discharge\",\"MUSCULOSKELETAL: NEGATIVE for significant arthralgias or myalgia\",\"NEURO: NEGATIVE for weakness, dizziness or paresthesias\",\"PSYCHIATRIC: NEGATIVE for changes in mood or affect\"}    OBJECTIVE:   There were no vitals taken for this visit.    Physical Exam  {Exam Choices (Optional):926058}    {Diagnostic Test Results (Optional):103729::\"Diagnostic Test Results:\",\"Labs reviewed in Epic\"}    ASSESSMENT/PLAN:   {Diag Picklist:561564}    Patient has been advised of split billing requirements and indicates understanding: {YES / NO:537301::\"Yes\"}  COUNSELING:   {MALE COUNSELING MESSAGES:169636::\"Reviewed preventive health counseling, as reflected in patient instructions\"}    Estimated body mass index is 30.86 kg/m  as calculated from the following:    Height as of 1/12/21: 1.715 m (5' 7.5\").    Weight as of 7/7/21: 90.7 kg (200 lb).     {Weight Management Plan (ACO) Complete if BMI is abnormal-  Ages 18-64  BMI >24.9.  Age 65+ with BMI <23 or >30 (Optional):411788}    He reports that he has never smoked. He has never used smokeless tobacco.      Counseling Resources:  ATP IV Guidelines  Pooled Cohorts Equation Calculator  FRAX Risk Assessment  ICSI Preventive Guidelines  Dietary Guidelines for Americans, 2010  USDA's MyPlate  ASA Prophylaxis  Lung CA Screening    Elen Box PA-C  [facility]  "

## 2021-11-16 NOTE — PROGRESS NOTES
SUBJECTIVE:   CC: Artis Chou is an 26 year old male who presents for preventative health visit.       Patient has been advised of split billing requirements and indicates understanding: Yes  Healthy Habits:     Getting at least 3 servings of Calcium per day:  NO    Bi-annual eye exam:  NO    Dental care twice a year:  NO    Sleep apnea or symptoms of sleep apnea:  Daytime drowsiness    Diet:  Regular (no restrictions)    Frequency of exercise:  None    Taking medications regularly:  Yes    Medication side effects:  None    PHQ-2 Total Score: 2    Additional concerns today:  Yes  Imm/Inj  Pertinent negatives include no abdominal pain, arthralgias, chest pain, chills, congestion, coughing, fever, headaches, joint swelling, myalgias, nausea, rash, sore throat or weakness.           Medication Followup of adderall  Ran out 1 week ago     Taking Medication as prescribed: NO    Side Effects:  None    Medication Helping Symptoms:  Yes    No trouble with sleep from this medication      Depression and Anxiety Follow-Up TRAZODONE - feeling tired     How are you doing with your depression since your last visit? Improved     How are you doing with your anxiety since your last visit?  Improved     Are you having other symptoms that might be associated with depression or anxiety? No    Have you had a significant life event? No     Do you have any concerns with your use of alcohol or other drugs? No     Feels tired in am with trazodone.  Still feels needs something to help with sleep.      Tried gabapentin and it helped without feeling groggy.      Social History     Tobacco Use     Smoking status: Never Smoker     Smokeless tobacco: Never Used   Substance Use Topics     Alcohol use: Yes     Comment: rarely     Drug use: Yes     Types: Marijuana     Comment: carol     PHQ 2/3/2021 4/27/2021 11/6/2021   PHQ-9 Total Score 7 9 10   Q9: Thoughts of better off dead/self-harm past 2 weeks Not at all Not at all Not at all      KATY-7 SCORE 12/2/2020 1/13/2021 2/3/2021   Total Score 13 7 1     Last PHQ-9 11/6/2021   1.  Little interest or pleasure in doing things 1   2.  Feeling down, depressed, or hopeless 1   3.  Trouble falling or staying asleep, or sleeping too much 3   4.  Feeling tired or having little energy 2   5.  Poor appetite or overeating 1   6.  Feeling bad about yourself 1   7.  Trouble concentrating 1   8.  Moving slowly or restless 0   Q9: Thoughts of better off dead/self-harm past 2 weeks 0   PHQ-9 Total Score 10   Difficulty at work, home, or with people -     KATY-7  2/3/2021   1. Feeling nervous, anxious, or on edge 0   2. Not being able to stop or control worrying 0   3. Worrying too much about different things 0   4. Trouble relaxing 1   5. Being so restless that it is hard to sit still 0   6. Becoming easily annoyed or irritable 0   7. Feeling afraid, as if something awful might happen 0   KATY-7 Total Score 1   If you checked any problems, how difficult have they made it for you to do your work, take care of things at home, or get along with other people? Not difficult at all       Suicide Assessment Five-step Evaluation and Treatment (SAFE-T)      Today's PHQ-2 Score:   PHQ-2 ( 1999 Pfizer) 11/16/2021   Q1: Little interest or pleasure in doing things 1   Q2: Feeling down, depressed or hopeless 1   PHQ-2 Score 2   Q1: Little interest or pleasure in doing things Several days   Q2: Feeling down, depressed or hopeless Several days   PHQ-2 Score 2       Abuse: Current or Past(Physical, Sexual or Emotional)- No  Do you feel safe in your environment? Yes    Have you ever done Advance Care Planning? (For example, a Health Directive, POLST, or a discussion with a medical provider or your loved ones about your wishes): No, advance care planning information given to patient to review.  Patient plans to discuss their wishes with loved ones or provider.      Social History     Tobacco Use     Smoking status: Never Smoker      Smokeless tobacco: Never Used   Substance Use Topics     Alcohol use: Yes     Comment: rarely     If you drink alcohol do you typically have >3 drinks per day or >7 drinks per week? No    Alcohol Use 11/16/2021   Prescreen: >3 drinks/day or >7 drinks/week? No   Prescreen: >3 drinks/day or >7 drinks/week? -   No flowsheet data found.    Last PSA: No results found for: PSA    Reviewed orders with patient. Reviewed health maintenance and updated orders accordingly - Yes  Labs reviewed in EPIC    Reviewed and updated as needed this visit by clinical staff  Tobacco  Allergies  Meds   Med Hx  Surg Hx  Fam Hx  Soc Hx       Reviewed and updated as needed this visit by Provider    Meds                Review of Systems   Constitutional: Negative for chills and fever.   HENT: Negative for congestion, ear pain, hearing loss and sore throat.    Eyes: Positive for visual disturbance (left eye having more blurry.  ). Negative for pain.   Respiratory: Negative for cough and shortness of breath.    Cardiovascular: Negative for chest pain, palpitations and peripheral edema.   Gastrointestinal: Negative for abdominal pain, constipation, diarrhea, heartburn, hematochezia and nausea.   Genitourinary: Negative for dysuria, frequency, genital sores, hematuria, impotence, penile discharge and urgency.   Musculoskeletal: Negative for arthralgias, joint swelling and myalgias.   Skin: Negative for rash.   Neurological: Negative for dizziness, weakness, headaches and paresthesias.   Psychiatric/Behavioral: Positive for mood changes. The patient is not nervous/anxious.          OBJECTIVE:   /82   Pulse 55   Temp 98.8  F (37.1  C) (Oral)   Wt 85.7 kg (189 lb)   BMI 29.16 kg/m      Physical Exam  Constitutional:       General: He is not in acute distress.     Appearance: He is well-developed.   HENT:      Right Ear: Tympanic membrane, ear canal and external ear normal.      Left Ear: Tympanic membrane, ear canal and external ear  normal.      Mouth/Throat:      Pharynx: No oropharyngeal exudate.   Eyes:      Pupils: Pupils are equal, round, and reactive to light.   Neck:      Thyroid: No thyromegaly.   Cardiovascular:      Rate and Rhythm: Normal rate and regular rhythm.      Heart sounds: Normal heart sounds.   Pulmonary:      Effort: Pulmonary effort is normal.      Breath sounds: Normal breath sounds.   Abdominal:      General: Bowel sounds are normal.      Palpations: Abdomen is soft.      Tenderness: There is no abdominal tenderness.   Lymphadenopathy:      Cervical: No cervical adenopathy.   Skin:     General: Skin is warm and dry.   Neurological:      Mental Status: He is alert and oriented to person, place, and time.      Deep Tendon Reflexes: Reflexes normal.   Psychiatric:         Behavior: Behavior normal.           Diagnostic Test Results:  Labs reviewed in Epic    ASSESSMENT/PLAN:   (Z00.00) Routine history and physical examination of adult  (primary encounter diagnosis)  Comment:   Plan: Adult Eye Referral        See eye doctor for possibly new glasses    (F41.1) KATY (generalized anxiety disorder)  Comment: stable.    Plan: gabapentin (NEURONTIN) 300 MG capsule,         sertraline (ZOLOFT) 100 MG tablet        Try gabapentin for sleep.  Has been taking his moms and it helps.      (F33.1) Major depressive disorder, recurrent episode, moderate (H)  Comment:   Plan: sertraline (ZOLOFT) 100 MG tablet        stable    (F90.0) Attention deficit hyperactivity disorder, inattentive type, moderate  Comment:   Plan: amphetamine-dextroamphetamine (ADDERALL XR) 20         MG 24 hr capsule, amphetamine-dextroamphetamine        (ADDERALL XR) 20 MG 24 hr capsule,         amphetamine-dextroamphetamine (ADDERALL XR) 20         MG 24 hr capsule, amphetamine-dextroamphetamine        (ADDERALL) 5 MG tablet,         amphetamine-dextroamphetamine (ADDERALL) 5 MG         tablet, amphetamine-dextroamphetamine         (ADDERALL) 5 MG tablet        As  "above    (Z23) High priority for 2019-nCoV vaccine  Comment:   Plan: COVID-19,PF,PFIZER (12+ Yrs PURPLE LABEL)              Patient has been advised of split billing requirements and indicates understanding: Yes  COUNSELING:   Reviewed preventive health counseling, as reflected in patient instructions       Regular exercise       Healthy diet/nutrition    Estimated body mass index is 29.16 kg/m  as calculated from the following:    Height as of 1/12/21: 1.715 m (5' 7.5\").    Weight as of this encounter: 85.7 kg (189 lb).     Weight management plan: Discussed healthy diet and exercise guidelines    He reports that he has never smoked. He has never used smokeless tobacco.      Counseling Resources:  ATP IV Guidelines  Pooled Cohorts Equation Calculator  FRAX Risk Assessment  ICSI Preventive Guidelines  Dietary Guidelines for Americans, 2010  USDA's MyPlate  ASA Prophylaxis  Lung CA Screening    NKECHI Kaur [facility]  "

## 2021-11-17 ASSESSMENT — ANXIETY QUESTIONNAIRES
7. FEELING AFRAID AS IF SOMETHING AWFUL MIGHT HAPPEN: NOT AT ALL
1. FEELING NERVOUS, ANXIOUS, OR ON EDGE: NOT AT ALL
IF YOU CHECKED OFF ANY PROBLEMS ON THIS QUESTIONNAIRE, HOW DIFFICULT HAVE THESE PROBLEMS MADE IT FOR YOU TO DO YOUR WORK, TAKE CARE OF THINGS AT HOME, OR GET ALONG WITH OTHER PEOPLE: SOMEWHAT DIFFICULT
5. BEING SO RESTLESS THAT IT IS HARD TO SIT STILL: SEVERAL DAYS
3. WORRYING TOO MUCH ABOUT DIFFERENT THINGS: MORE THAN HALF THE DAYS
GAD7 TOTAL SCORE: 6
6. BECOMING EASILY ANNOYED OR IRRITABLE: SEVERAL DAYS
2. NOT BEING ABLE TO STOP OR CONTROL WORRYING: SEVERAL DAYS

## 2021-11-17 ASSESSMENT — PATIENT HEALTH QUESTIONNAIRE - PHQ9: 5. POOR APPETITE OR OVEREATING: SEVERAL DAYS

## 2021-11-18 ASSESSMENT — PATIENT HEALTH QUESTIONNAIRE - PHQ9: SUM OF ALL RESPONSES TO PHQ QUESTIONS 1-9: 9

## 2021-11-18 ASSESSMENT — ANXIETY QUESTIONNAIRES: GAD7 TOTAL SCORE: 6

## 2021-12-01 RX ORDER — SERTRALINE HYDROCHLORIDE 100 MG/1
TABLET, FILM COATED ORAL
Qty: 45 TABLET | Refills: 0 | OUTPATIENT
Start: 2021-12-01

## 2021-12-01 NOTE — TELEPHONE ENCOUNTER
PHQ 11/17/2021   PHQ-9 Total Score 9   Q9: Thoughts of better off dead/self-harm past 2 weeks Not at all   An Allison CMA

## 2021-12-01 NOTE — TELEPHONE ENCOUNTER
PHQ 11/17/2021   PHQ-9 Total Score 9   Q9: Thoughts of better off dead/self-harm past 2 weeks Not at all     Please see office visit 11/16/2021.  Camila Cooper, CMA

## 2022-02-08 NOTE — PROGRESS NOTES
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    Juani Ceron MD  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 - Less than 1 week   Have you seen anyone else for your pain? No  Where in your body do you have pain? Musculoskeletal problem/pain  Onset/Duration: Less than 1 Week   Description  Location: Shoulder - bilateral  Joint Swelling: no  Redness: YES  Pain: YES  Warmth: YES  Intensity:  moderate  Progression of Symptoms:  same  Accompanying signs and symptoms:   Fevers: no  Numbness/tingling/weakness: no  History  Trauma to the area: no  Recent illness:  no  Previous similar problem: no  Previous evaluation:  no  Precipitating or alleviating factors:  Aggravating factors include: Movement, Overuse  Therapies tried and outcome: ice, Little improvement           Review of Systems   Constitutional, HEENT, cardiovascular, pulmonary, GI, , musculoskeletal, neuro, skin, endocrine and psych systems are negative, except as otherwise noted.      Objective    There were no vitals taken for this visit.  There is no height or weight on file to calculate BMI.  Physical Exam   GENERAL: healthy,  alert and no distress  EYES: Eyes grossly normal to inspection, PERRL and conjunctivae and sclerae normal  NECK: no adenopathy, no asymmetry, masses, or scars and thyroid normal to palpation  RESP: lungs clear to auscultation - no rales, rhonchi or wheezes  CV: regular rate and rhythm, normal S1 S2, no S3 or S4, no murmur, click or rub, no peripheral edema and peripheral pulses strong  MS: no gross musculoskeletal defects noted, no edema EXCEPT there is some pain with reach overhead and abduction and internal rotation     Results for orders placed or performed in visit on 02/09/22   XR Shoulder Right G/E 3 Views     Status: None    Narrative    RIGHT SHOULDER THREE OR MORE VIEWS   2/9/2022 2:20 PM     HISTORY: Acute pain of right shoulder.    COMPARISON: None.      Impression    IMPRESSION: No evidence of acute fracture or dislocation. Normal joint  spacing and alignment.    ESTELLE JACKSON MD         SYSTEM ID:  [id number]

## 2022-02-09 ENCOUNTER — OFFICE VISIT (OUTPATIENT)
Dept: FAMILY MEDICINE | Facility: CLINIC | Age: 27
End: 2022-02-09
Payer: COMMERCIAL

## 2022-02-09 ENCOUNTER — ANCILLARY PROCEDURE (OUTPATIENT)
Dept: GENERAL RADIOLOGY | Facility: CLINIC | Age: 27
End: 2022-02-09
Attending: FAMILY MEDICINE
Payer: COMMERCIAL

## 2022-02-09 VITALS
OXYGEN SATURATION: 97 % | TEMPERATURE: 97.1 F | BODY MASS INDEX: 27.05 KG/M2 | WEIGHT: 175.3 LBS | DIASTOLIC BLOOD PRESSURE: 73 MMHG | HEART RATE: 70 BPM | SYSTOLIC BLOOD PRESSURE: 135 MMHG

## 2022-02-09 DIAGNOSIS — M25.511 ACUTE PAIN OF RIGHT SHOULDER: ICD-10-CM

## 2022-02-09 DIAGNOSIS — M25.511 ACUTE PAIN OF RIGHT SHOULDER: Primary | ICD-10-CM

## 2022-02-09 PROCEDURE — 73030 X-RAY EXAM OF SHOULDER: CPT | Mod: RT | Performed by: RADIOLOGY

## 2022-02-09 PROCEDURE — 99214 OFFICE O/P EST MOD 30 MIN: CPT | Performed by: FAMILY MEDICINE

## 2022-02-14 ENCOUNTER — OFFICE VISIT (OUTPATIENT)
Dept: ORTHOPEDICS | Facility: CLINIC | Age: 27
End: 2022-02-14
Payer: COMMERCIAL

## 2022-02-14 VITALS
DIASTOLIC BLOOD PRESSURE: 78 MMHG | SYSTOLIC BLOOD PRESSURE: 134 MMHG | BODY MASS INDEX: 26.52 KG/M2 | HEIGHT: 68 IN | WEIGHT: 175 LBS

## 2022-02-14 DIAGNOSIS — M25.511 ACUTE PAIN OF RIGHT SHOULDER: ICD-10-CM

## 2022-02-14 PROCEDURE — 99243 OFF/OP CNSLTJ NEW/EST LOW 30: CPT | Performed by: PEDIATRICS

## 2022-02-14 ASSESSMENT — MIFFLIN-ST. JEOR: SCORE: 1730.35

## 2022-02-14 NOTE — PROGRESS NOTES
ASSESSMENT & PLAN    Artis was seen today for pain.    Diagnoses and all orders for this visit:    Acute pain of right shoulder  -     Orthopedic  Referral  -     [facility]; Future      Discussed AC joint, vs possibly rotator cuff related. Tear not favored.  We discussed the following: symptom treatment, activity modification/rest, imaging, rehab and potential for improvement with time. Following discussion, plan:  PT next.  We discussed consideration of additional imaging of the affected area with MRI, but this is not required currently given assessment and plan for other intervention.      See Patient Instructions  Patient Instructions   Consistent with issue from repetitive motion. Favor AC joint source, but soft tissue such as tendon also strong consideration.    Plan physical therapy next, referral placed.  Activity modification reviewed. Letter provided for work.  Monitor 3 weeks to start.    If you have any further questions for your physician or physician s care team you can call [phone] and use option 3 to leave a voice message. Calls received during business hours will be returned same day.        Rohit Faria [facility]      CC: Juani Ceron      -----  Chief Complaint   Patient presents with     Right Shoulder - Pain       SUBJECTIVE  Artis Chou is a/an 26 year old male who is seen in consultation at the request of  Juani Ceron M.D. for evaluation of right shoulder pain.  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      The patient is seen by themselves.  The patient is Right handed    Onset: 2-3 week(s) ago. Overuse   Location of Pain: right shoulder, diffuse   Worsened by: use, laying on right side, IR, Overhead   Better with: nothing   Treatments tried: ice and Tylenol  Associated symptoms: swelling, bruising, popping, feeling of instabiltiy  "    Orthopedic/Surgical history: NO  Social History/Occupation: b[employer]     No family history pertinent to patient's problem today.     **  Initially noted with repetitive motion at work.  Pop at shoulder sometimes, not really with pain.  Pain is more anterior, can be diffuse. Can radiate up to neck.        REVIEW OF SYSTEMS:  Review of Systems   All other systems reviewed and are negative.        OBJECTIVE:  /78   Ht 1.715 m (5' 7.5\")   Wt 79.4 kg (175 lb)   BMI 27.00 kg/m     General: healthy, alert and in no distress  HEENT: no scleral icterus or conjunctival erythema  Skin: no suspicious lesions or rash. No jaundice.  CV: distal perfusion intact   Resp: normal respiratory effort without conversational dyspnea   Psych: normal mood and affect  Gait: normal steady gait with appropriate coordination and balance   Neuro: Normal light sensory exam of  extremity       Right Shoulder exam    ROM:      forward flexion grossly full        abduction grossly full       internal rotation grossly full       external rotation grossly full  Some pain end of IR, mild end of flexion    Tender:      acromioclavicular joint       Anterior shoulder    Strength:      abduction 5/5       internal rotation 5/5       external rotation 5/5    Impingement testing:      Pain with Marrero       Pain with  empty can       Pain with crossover    Skin:      well perfused       capillary refill brisk       Small area ecchymosis anterosuperior shoulder, appears just anterior/inferior to AC joint    Sensation:      normal sensation over shoulder and upper extremity       RADIOLOGY:  I independently visualized and reviewed these images with the patient  No acute bony abnormality.      Results for orders placed or performed in visit on 02/09/22   XR Shoulder Right G/E 3 Views    Narrative    RIGHT SHOULDER THREE OR MORE VIEWS   2/9/2022 2:20 PM     HISTORY: Acute pain of right shoulder.    COMPARISON: None.      Impression    IMPRESSION: No " evidence of acute fracture or dislocation. Normal joint  spacing and alignment.    ESTELLE JACKSON MD         SYSTEM ID:  [id number]

## 2022-02-14 NOTE — LETTER
2/14/2022         RE: Artis Chou  [address] [place] [address]        Dear Colleague,    Thank you for referring your patient, Artis Chou, to the [facility]. Please see a copy of my visit note below.    ASSESSMENT & PLAN    Artis was seen today for pain.    Diagnoses and all orders for this visit:    Acute pain of right shoulder  -     Orthopedic  Referral  -     [facility] Referral; Future      Discussed AC joint, vs possibly rotator cuff related. Tear not favored.  We discussed the following: symptom treatment, activity modification/rest, imaging, rehab and potential for improvement with time. Following discussion, plan:  PT next.  We discussed consideration of additional imaging of the affected area with MRI, but this is not required currently given assessment and plan for other intervention.      See Patient Instructions  Patient Instructions   Consistent with issue from repetitive motion. Favor AC joint source, but soft tissue such as tendon also strong consideration.    Plan physical therapy next, referral placed.  Activity modification reviewed. Letter provided for work.  Monitor 3 weeks to start.    If you have any further questions for your physician or physician s care team you can call [phone] and use option 3 to leave a voice message. Calls received during business hours will be returned same day.        Rohit Faria DO  [facility]      CC: Juani Ceron      -----  Chief Complaint   Patient presents with     Right Shoulder - Pain       SUBJECTIVE  Artis Chou is a/an 26 year old male who is seen in consultation at the request of  Juani Ceron M.D. for evaluation of right shoulder pain.  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      The patient is seen by themselves.  The patient is Right  "handed    Onset: 2-3 week(s) ago. Overuse   Location of Pain: right shoulder, diffuse   Worsened by: use, laying on right side, IR, Overhead   Better with: nothing   Treatments tried: ice and Tylenol  Associated symptoms: swelling, bruising, popping, feeling of instabiltiy     Orthopedic/Surgical history: NO  Social History/Occupation: cook     No family history pertinent to patient's problem today.     **  Initially noted with repetitive motion at work.  Pop at shoulder sometimes, not really with pain.  Pain is more anterior, can be diffuse. Can radiate up to neck.        REVIEW OF SYSTEMS:  Review of Systems   All other systems reviewed and are negative.        OBJECTIVE:  /78   Ht 1.715 m (5' 7.5\")   Wt 79.4 kg (175 lb)   BMI 27.00 kg/m     General: healthy, alert and in no distress  HEENT: no scleral icterus or conjunctival erythema  Skin: no suspicious lesions or rash. No jaundice.  CV: distal perfusion intact   Resp: normal respiratory effort without conversational dyspnea   Psych: normal mood and affect  Gait: normal steady gait with appropriate coordination and balance   Neuro: Normal light sensory exam of  extremity       Right Shoulder exam    ROM:      forward flexion grossly full        abduction grossly full       internal rotation grossly full       external rotation grossly full  Some pain end of IR, mild end of flexion    Tender:      acromioclavicular joint       Anterior shoulder    Strength:      abduction 5/5       internal rotation 5/5       external rotation 5/5    Impingement testing:      Pain with Marrero       Pain with  empty can       Pain with crossover    Skin:      well perfused       capillary refill brisk       Small area ecchymosis anterosuperior shoulder, appears just anterior/inferior to AC joint    Sensation:      normal sensation over shoulder and upper extremity       RADIOLOGY:  I independently visualized and reviewed these images with the patient  No acute bony " abnormality.      Results for orders placed or performed in visit on 02/09/22   XR Shoulder Right G/E 3 Views    Narrative    RIGHT SHOULDER THREE OR MORE VIEWS   2/9/2022 2:20 PM     HISTORY: Acute pain of right shoulder.    COMPARISON: None.      Impression    IMPRESSION: No evidence of acute fracture or dislocation. Normal joint  spacing and alignment.    ESTELLE JACKSON MD         SYSTEM ID:  [id number]                      Again, thank you for allowing me to participate in the care of your patient.        Sincerely,        Rohit Faria DO

## 2022-02-14 NOTE — PATIENT INSTRUCTIONS
Consistent with issue from repetitive motion. Favor AC joint source, but soft tissue such as tendon also strong consideration.    Plan physical therapy next, referral placed.  Activity modification reviewed. Letter provided for work.  Monitor 3 weeks to start.    If you have any further questions for your physician or physician s care team you can call [phone] and use option 3 to leave a voice message. Calls received during business hours will be returned same day.

## 2022-02-14 NOTE — LETTER
[facility]  [address]  [facility] [address]  Phone: [phone]  Fax: [phone]      February 14, 2022      RE: Artis Chou  [address] [place] [address]        To whom it may concern:    Artis Chou is under my professional care. The employee is ABLE to return to work next scheduled work date.    When the patient returns to work, the following restrictions apply for 3 weeks:  Reach Above Shoulders: Not at all (0 hours) on right  Lift, carry, push, and pull: up to 5 lbs on right; may use right as helper for left if pain free  With right upper extremity, avoid repetitive motions. Try to keep arm close to body with work.      Sincerely,          Rohit Faria DO, CAQ

## 2022-04-13 ENCOUNTER — E-VISIT (OUTPATIENT)
Dept: FAMILY MEDICINE | Facility: CLINIC | Age: 27
End: 2022-04-13
Payer: COMMERCIAL

## 2022-04-13 DIAGNOSIS — F33.1 MAJOR DEPRESSIVE DISORDER, RECURRENT EPISODE, MODERATE (H): ICD-10-CM

## 2022-04-13 DIAGNOSIS — F41.1 GAD (GENERALIZED ANXIETY DISORDER): ICD-10-CM

## 2022-04-13 PROCEDURE — 99422 OL DIG E/M SVC 11-20 MIN: CPT | Performed by: PHYSICIAN ASSISTANT

## 2022-04-15 RX ORDER — SERTRALINE HYDROCHLORIDE 100 MG/1
TABLET, FILM COATED ORAL
Qty: 60 TABLET | Refills: 1 | Status: SHIPPED | OUTPATIENT
Start: 2022-04-15 | End: 2022-05-09

## 2022-04-15 RX ORDER — MIRTAZAPINE 7.5 MG/1
7.5 TABLET, FILM COATED ORAL AT BEDTIME
Qty: 30 TABLET | Refills: 1 | Status: SHIPPED | OUTPATIENT
Start: 2022-04-15 | End: 2022-05-09

## 2022-04-25 ENCOUNTER — E-VISIT (OUTPATIENT)
Dept: FAMILY MEDICINE | Facility: CLINIC | Age: 27
End: 2022-04-25
Payer: COMMERCIAL

## 2022-04-25 DIAGNOSIS — F90.0 ATTENTION DEFICIT HYPERACTIVITY DISORDER, INATTENTIVE TYPE, MODERATE: Primary | ICD-10-CM

## 2022-04-25 PROCEDURE — 99207 PR NON-BILLABLE SERV PER CHARTING: CPT | Performed by: PHYSICIAN ASSISTANT

## 2022-04-26 ENCOUNTER — MYC REFILL (OUTPATIENT)
Dept: FAMILY MEDICINE | Facility: CLINIC | Age: 27
End: 2022-04-26
Payer: COMMERCIAL

## 2022-04-26 DIAGNOSIS — F90.0 ATTENTION DEFICIT HYPERACTIVITY DISORDER, INATTENTIVE TYPE, MODERATE: ICD-10-CM

## 2022-04-26 RX ORDER — DEXTROAMPHETAMINE SACCHARATE, AMPHETAMINE ASPARTATE MONOHYDRATE, DEXTROAMPHETAMINE SULFATE AND AMPHETAMINE SULFATE 5; 5; 5; 5 MG/1; MG/1; MG/1; MG/1
20 CAPSULE, EXTENDED RELEASE ORAL DAILY
Qty: 30 CAPSULE | Refills: 0 | Status: SHIPPED | OUTPATIENT
Start: 2022-04-26 | End: 2022-05-26

## 2022-04-26 RX ORDER — DEXTROAMPHETAMINE SACCHARATE, AMPHETAMINE ASPARTATE MONOHYDRATE, DEXTROAMPHETAMINE SULFATE AND AMPHETAMINE SULFATE 5; 5; 5; 5 MG/1; MG/1; MG/1; MG/1
20 CAPSULE, EXTENDED RELEASE ORAL DAILY
Qty: 30 CAPSULE | Refills: 0 | OUTPATIENT
Start: 2022-04-26

## 2022-04-26 RX ORDER — DEXTROAMPHETAMINE SACCHARATE, AMPHETAMINE ASPARTATE MONOHYDRATE, DEXTROAMPHETAMINE SULFATE AND AMPHETAMINE SULFATE 5; 5; 5; 5 MG/1; MG/1; MG/1; MG/1
20 CAPSULE, EXTENDED RELEASE ORAL DAILY
Qty: 30 CAPSULE | Refills: 0 | Status: SHIPPED | OUTPATIENT
Start: 2022-05-27 | End: 2022-06-26

## 2022-04-26 RX ORDER — DEXTROAMPHETAMINE SACCHARATE, AMPHETAMINE ASPARTATE MONOHYDRATE, DEXTROAMPHETAMINE SULFATE AND AMPHETAMINE SULFATE 5; 5; 5; 5 MG/1; MG/1; MG/1; MG/1
20 CAPSULE, EXTENDED RELEASE ORAL DAILY
Qty: 30 CAPSULE | Refills: 0 | Status: SHIPPED | OUTPATIENT
Start: 2022-06-27 | End: 2022-07-27

## 2022-04-26 NOTE — TELEPHONE ENCOUNTER
Duplicate medication request. Medication was filled 4/26/22 and Receipt confirmed by pharmacy (4/26/2022  9:08 AM CDT).     Macie Urias RN   [facility]

## 2022-04-27 RX ORDER — DEXTROAMPHETAMINE SACCHARATE, AMPHETAMINE ASPARTATE MONOHYDRATE, DEXTROAMPHETAMINE SULFATE AND AMPHETAMINE SULFATE 5; 5; 5; 5 MG/1; MG/1; MG/1; MG/1
20 CAPSULE, EXTENDED RELEASE ORAL DAILY
Qty: 30 CAPSULE | Refills: 0 | OUTPATIENT
Start: 2022-04-27

## 2022-05-25 DIAGNOSIS — F41.1 GAD (GENERALIZED ANXIETY DISORDER): ICD-10-CM

## 2022-05-25 DIAGNOSIS — F33.1 MAJOR DEPRESSIVE DISORDER, RECURRENT EPISODE, MODERATE (H): ICD-10-CM

## 2022-05-25 NOTE — LETTER
[facility]   [address] [place] [address]   Phone: [phone]         June 1, 2022         Artis Chou  [address] [place] [address]            Dear Artis:    We are concerned about your health care.  We recently provided you with medication refills.  Many medications require routine follow-up with your doctor.    Your prescription(s) have been refilled for ZOLOFT  so you may have time for the above noted follow-up. Please call scheduling at [phone]  soon so we can assure you have an appointment before your next refills are needed.    Thank You,

## 2022-05-26 NOTE — TELEPHONE ENCOUNTER
"    Requested Prescriptions   Pending Prescriptions Disp Refills     sertraline (ZOLOFT) 100 MG tablet 60 tablet 1     Sig: TAKE 2 TABLETS BY MOUTH EVERY DAY       SSRIs Protocol Failed - 5/25/2022  1:38 PM        Failed - PHQ-9 score less than 5 in past 6 months     Please review last PHQ-9 score.           Passed - Medication is active on med list        Passed - Patient is age 18 or older        Passed - Recent (6 mo) or future (30 days) visit within the authorizing provider's specialty     Patient had office visit in the last 6 months or has a visit in the next 30 days with authorizing provider or within the authorizing provider's specialty.  See \"Patient Info\" tab in inbasket, or \"Choose Columns\" in Meds & Orders section of the refill encounter.               Thanks,  ROSHAN Redding  [facility]     "

## 2022-05-27 RX ORDER — SERTRALINE HYDROCHLORIDE 100 MG/1
TABLET, FILM COATED ORAL
Qty: 60 TABLET | Refills: 1 | OUTPATIENT
Start: 2022-05-27

## 2022-05-31 NOTE — TELEPHONE ENCOUNTER
I attempted to call the patient unable to reach him. Called twice go the same message that the call could not be completed at this time after ringing multiple times both times.    Merry Baez [facility]

## 2022-07-18 DIAGNOSIS — F41.1 GAD (GENERALIZED ANXIETY DISORDER): ICD-10-CM

## 2022-07-18 RX ORDER — GABAPENTIN 300 MG/1
CAPSULE ORAL
Qty: 30 CAPSULE | Refills: 5 | Status: SHIPPED | OUTPATIENT
Start: 2022-07-18 | End: 2023-01-27

## 2022-07-18 NOTE — LETTER
[facility]  [address] [address] [facility] [address]  [phone]          July 19, 2022    Artis Chou                                                                                                                     [facility] [place] [address]            Dear Artis,      Your provider has sent a  jadiel refill of Gabapentin You are due for an appointment for further refills.  Please contact the clinic to schedule an appointment for further refills.           Sincerely,         Elen Box PA-C

## 2022-07-18 NOTE — TELEPHONE ENCOUNTER
Routing refill request to provider for review/approval because:  Drug not on the [facility] refill protocol         Susanna BISHOP RN, BSN  [facility]

## 2022-09-03 DIAGNOSIS — F33.1 MAJOR DEPRESSIVE DISORDER, RECURRENT EPISODE, MODERATE (H): ICD-10-CM

## 2022-09-03 DIAGNOSIS — F41.1 GAD (GENERALIZED ANXIETY DISORDER): ICD-10-CM

## 2022-09-03 NOTE — LETTER
September 7, 2022        Artis Chou  [address] [place] [address]                Dear Artis,       Your provider has sent a 30 day jadiel refill of sertraline (ZOLOFT) 100 MG tablet. You are due for an appointment for further refills. Please contact the clinic to schedule an appointment for further refills.     Sincerely,     Your Care team/sg

## 2022-09-07 RX ORDER — SERTRALINE HYDROCHLORIDE 100 MG/1
TABLET, FILM COATED ORAL
Qty: 60 TABLET | Refills: 0 | Status: SHIPPED | OUTPATIENT
Start: 2022-09-07 | End: 2022-09-30

## 2022-09-07 NOTE — TELEPHONE ENCOUNTER
Routing refill request to provider for review/approval because:  PHQ-9 score:    PHQ 6/6/2022   PHQ-9 Total Score 10   Q9: Thoughts of better off dead/self-harm past 2 weeks Not at all

## 2022-10-30 ENCOUNTER — E-VISIT (OUTPATIENT)
Dept: FAMILY MEDICINE | Facility: CLINIC | Age: 27
End: 2022-10-30
Payer: COMMERCIAL

## 2022-10-30 DIAGNOSIS — F41.1 GAD (GENERALIZED ANXIETY DISORDER): ICD-10-CM

## 2022-10-30 DIAGNOSIS — F33.1 MAJOR DEPRESSIVE DISORDER, RECURRENT EPISODE, MODERATE (H): ICD-10-CM

## 2022-10-30 PROCEDURE — 99421 OL DIG E/M SVC 5-10 MIN: CPT | Performed by: PHYSICIAN ASSISTANT

## 2022-10-31 RX ORDER — MIRTAZAPINE 7.5 MG/1
7.5 TABLET, FILM COATED ORAL AT BEDTIME
Qty: 90 TABLET | Refills: 1 | Status: SHIPPED | OUTPATIENT
Start: 2022-10-31 | End: 2023-04-20

## 2022-10-31 RX ORDER — SERTRALINE HYDROCHLORIDE 100 MG/1
100 TABLET, FILM COATED ORAL DAILY
Qty: 90 TABLET | Refills: 0 | Status: SHIPPED | OUTPATIENT
Start: 2022-10-31 | End: 2022-11-15

## 2023-01-08 ENCOUNTER — E-VISIT (OUTPATIENT)
Dept: FAMILY MEDICINE | Facility: CLINIC | Age: 28
End: 2023-01-08
Payer: COMMERCIAL

## 2023-01-08 ENCOUNTER — HEALTH MAINTENANCE LETTER (OUTPATIENT)
Age: 28
End: 2023-01-08

## 2023-01-08 DIAGNOSIS — F90.0 ATTENTION DEFICIT HYPERACTIVITY DISORDER, INATTENTIVE TYPE, MODERATE: Primary | ICD-10-CM

## 2023-01-08 PROCEDURE — 99421 OL DIG E/M SVC 5-10 MIN: CPT | Performed by: PHYSICIAN ASSISTANT

## 2023-01-10 RX ORDER — DEXTROAMPHETAMINE SACCHARATE, AMPHETAMINE ASPARTATE MONOHYDRATE, DEXTROAMPHETAMINE SULFATE AND AMPHETAMINE SULFATE 5; 5; 5; 5 MG/1; MG/1; MG/1; MG/1
20 CAPSULE, EXTENDED RELEASE ORAL DAILY
Qty: 30 CAPSULE | Refills: 0 | Status: SHIPPED | OUTPATIENT
Start: 2023-01-10 | End: 2023-02-09

## 2023-01-10 RX ORDER — DEXTROAMPHETAMINE SACCHARATE, AMPHETAMINE ASPARTATE MONOHYDRATE, DEXTROAMPHETAMINE SULFATE AND AMPHETAMINE SULFATE 5; 5; 5; 5 MG/1; MG/1; MG/1; MG/1
20 CAPSULE, EXTENDED RELEASE ORAL DAILY
Qty: 30 CAPSULE | Refills: 0 | Status: SHIPPED | OUTPATIENT
Start: 2023-02-10 | End: 2023-03-12

## 2023-01-10 RX ORDER — DEXTROAMPHETAMINE SACCHARATE, AMPHETAMINE ASPARTATE MONOHYDRATE, DEXTROAMPHETAMINE SULFATE AND AMPHETAMINE SULFATE 5; 5; 5; 5 MG/1; MG/1; MG/1; MG/1
20 CAPSULE, EXTENDED RELEASE ORAL DAILY
Qty: 30 CAPSULE | Refills: 0 | Status: SHIPPED | OUTPATIENT
Start: 2023-03-13 | End: 2023-04-12

## 2023-02-13 DIAGNOSIS — F33.1 MAJOR DEPRESSIVE DISORDER, RECURRENT EPISODE, MODERATE (H): ICD-10-CM

## 2023-02-13 DIAGNOSIS — F41.1 GAD (GENERALIZED ANXIETY DISORDER): ICD-10-CM

## 2023-02-13 RX ORDER — SERTRALINE HYDROCHLORIDE 100 MG/1
TABLET, FILM COATED ORAL
Qty: 60 TABLET | Refills: 0 | Status: SHIPPED | OUTPATIENT
Start: 2023-02-13 | End: 2023-02-28

## 2023-02-28 ENCOUNTER — OFFICE VISIT (OUTPATIENT)
Dept: INTERNAL MEDICINE | Facility: CLINIC | Age: 28
End: 2023-02-28
Payer: COMMERCIAL

## 2023-02-28 ENCOUNTER — ANCILLARY PROCEDURE (OUTPATIENT)
Dept: GENERAL RADIOLOGY | Facility: CLINIC | Age: 28
End: 2023-02-28
Attending: INTERNAL MEDICINE
Payer: COMMERCIAL

## 2023-02-28 VITALS
HEART RATE: 77 BPM | OXYGEN SATURATION: 100 % | HEIGHT: 68 IN | SYSTOLIC BLOOD PRESSURE: 154 MMHG | TEMPERATURE: 98.6 F | BODY MASS INDEX: 27.43 KG/M2 | DIASTOLIC BLOOD PRESSURE: 86 MMHG | WEIGHT: 181 LBS
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
## 2023-02-28 DIAGNOSIS — R03.0 ELEVATED BLOOD PRESSURE READING WITHOUT DIAGNOSIS OF HYPERTENSION: ICD-10-CM

## 2023-02-28 DIAGNOSIS — R07.81 RIB PAIN ON RIGHT SIDE: ICD-10-CM

## 2023-02-28 DIAGNOSIS — F41.1 GAD (GENERALIZED ANXIETY DISORDER): ICD-10-CM
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
## 2023-02-28 DIAGNOSIS — M54.50 ACUTE RIGHT-SIDED LOW BACK PAIN WITHOUT SCIATICA: ICD-10-CM

## 2023-02-28 DIAGNOSIS — F33.1 MAJOR DEPRESSIVE DISORDER, RECURRENT EPISODE, MODERATE (H): ICD-10-CM

## 2023-02-28 PROCEDURE — 99213 OFFICE O/P EST LOW 20 MIN: CPT | Performed by: INTERNAL MEDICINE

## 2023-02-28 PROCEDURE — 71101 X-RAY EXAM UNILAT RIBS/CHEST: CPT | Mod: TC | Performed by: RADIOLOGY

## 2023-02-28 PROCEDURE — 72100 X-RAY EXAM L-S SPINE 2/3 VWS: CPT | Mod: TC | Performed by: RADIOLOGY

## 2023-02-28 RX ORDER — DICLOFENAC SODIUM 75 MG/1
75 TABLET, DELAYED RELEASE ORAL 2 TIMES DAILY PRN
Qty: 60 TABLET | Refills: 1 | Status: SHIPPED | OUTPATIENT
Start: 2023-02-28 | End: 2023-06-07

## 2023-02-28 ASSESSMENT — ENCOUNTER SYMPTOMS
PALPITATIONS: 0
HEMATOCHEZIA: 0
BACK PAIN: 1
DIARRHEA: 0
DIFFICULTY URINATING: 0
ACTIVITY CHANGE: 1
NECK PAIN: 0
FREQUENCY: 0
SHORTNESS OF BREATH: 0
FATIGUE: 0
VOMITING: 0
ARTHRALGIAS: 1
WHEEZING: 0
FEVER: 0
DYSPHORIC MOOD: 1
COUGH: 0
NECK STIFFNESS: 0
APPETITE CHANGE: 0

## 2023-02-28 ASSESSMENT — PATIENT HEALTH QUESTIONNAIRE - PHQ9
10. IF YOU CHECKED OFF ANY PROBLEMS, HOW DIFFICULT HAVE THESE PROBLEMS MADE IT FOR YOU TO DO YOUR WORK, TAKE CARE OF THINGS AT HOME, OR GET ALONG WITH OTHER PEOPLE: VERY DIFFICULT
SUM OF ALL RESPONSES TO PHQ QUESTIONS 1-9: 12
SUM OF ALL RESPONSES TO PHQ QUESTIONS 1-9: 12

## 2023-02-28 NOTE — PROGRESS NOTES
"  Assessment & Plan     Injury due to fall, initial encounter  Fall on 2/20/23. Worsening R side low back pain and persistent R sided rib pan  - XR Lumbar Spine 2/3 Views; Future  - XR Ribs & Chest Right G/E 3 Views; Future    Acute right-sided low back pain without sciatica  Worsening R sided low back pain after falling down stairs 2/20. Pt has been limited in ability to work. Able to complete ADLs.   Pain worsens with twisting/bending.  Obtain x-ray to rule out = xray negative for fx.   Rest/ice/tyl/ibuprfen.     Rib pain on right side  Obtain x-ray  Obtain x-ray to rule out = xray negative for fx.   Rest/ice/tyl/ibuprfen    Elevated blood pressure reading without diagnosis of hypertension  154/86 on repeat. Return for BP recheck in 2-3 weeks if BP are elevated in the field. .     I spent a total of 20 minutes on the day of the visit.   Time spent doing chart review, history and exam, documentation and further activities per the note        BMI:   Estimated body mass index is 27.72 kg/m  as calculated from the following:    Height as of this encounter: 1.721 m (5' 7.75\").    Weight as of this encounter: 82.1 kg (181 lb).       Regular exercise    No follow-ups on file.    Irma Gudino MD  [facility] GRETA Wisdom is a 27 year old presenting for the following health issues:  Fall and Back Pain      Pt is 28 yo M with hx of depression, ADHD, and KATY presenting for R sided low back pain, R rib pain, and R knee pain after a falling down 3 steps on 2/20. Pt denies hitting head or LOC. Knee and rib pain have improved, but low back pain is worsening. Pain is localized to R low back and radiates to R buttocks and thick. Twisting and bending makes pain worse. Pt has been taking ibuprofen with minimal relief. No previous history of back injury or pain. Denies saddle numbness, incontinence. Pain 7/10 at worst. Pt has been limited in ability to work since and has not been back to work " since 2/22.     Pt also reports worsening depression symptoms. Denies suicidal/homicidal ideation or intent to self harm.    History of Present Illness       Back Pain:  He presents for follow up of back pain. Patient's back pain is a new problem.    Original cause of back pain: a fall  First noticed back pain: in the last week  Patient feels back pain: constantlyLocation of back pain:  Right lower back and right middle of back  Description of back pain: sharp  Back pain spreads: right buttocks and right thigh    Since patient first noticed back pain, pain is: gradually worsening  Does back pain interfere with his job:  Yes  On a scale of 1-10 (10 being the worst), patient describes pain as:  6  What makes back pain worse: bending, certain positions, sitting, standing and twisting  Acupuncture: not tried  Acetaminophen: not tried  Activity or exercise: not helpful  Chiropractor:  Not tried  Cold: helpful  Heat: not tried  Massage: not tried  Muscle relaxants: not tried  NSAIDS: not helpful  Opioids: not tried  Physical Therapy: not tried  Rest: not helpful  Steroid Injection: not tried  Stretching: not helpful  Surgery: not tried  TENS unit: not tried  Topical pain relievers: not helpful  Other healthcare providers patient is seeing for back pain: None    He eats 0-1 servings of fruits and vegetables daily.He consumes 5 sweetened beverage(s) daily.He exercises with enough effort to increase his heart rate 30 to 60 minutes per day.  He exercises with enough effort to increase his heart rate 5 days per week.   He is taking medications regularly.    Today's PHQ-9         PHQ-9 Total Score: 12    PHQ-9 Q9 Thoughts of better off dead/self-harm past 2 weeks :   Not at all    How difficult have these problems made it for you to do your work, take care of things at home, or get along with other people: Very difficult         Review of Systems   Constitutional: Positive for activity change. Negative for appetite change,  "fatigue and fever.   Respiratory: Negative for cough, shortness of breath and wheezing.    Cardiovascular: Negative for chest pain and palpitations.   Gastrointestinal: Negative for diarrhea, hematochezia and vomiting.   Genitourinary: Negative for difficulty urinating, frequency and urgency.   Musculoskeletal: Positive for arthralgias and back pain. Negative for neck pain and neck stiffness.   Psychiatric/Behavioral: Positive for dysphoric mood.            Objective    BP (!) 154/86 (BP Location: Right arm, Patient Position: Sitting, Cuff Size: Adult Regular)   Pulse 77   Temp 98.6  F (37  C) (Oral)   Ht 1.721 m (5' 7.75\")   Wt 82.1 kg (181 lb)   SpO2 100%   BMI 27.72 kg/m    Body mass index is 27.72 kg/m .  Physical Exam  Constitutional:       General: He is not in acute distress.     Appearance: Normal appearance.   HENT:      Head: Normocephalic and atraumatic.   Eyes:      Conjunctiva/sclera: Conjunctivae normal.      Pupils: Pupils are equal, round, and reactive to light.   Cardiovascular:      Rate and Rhythm: Normal rate and regular rhythm.      Pulses: Normal pulses.      Heart sounds: Normal heart sounds. No murmur heard.  Pulmonary:      Effort: Pulmonary effort is normal.      Breath sounds: Normal breath sounds. No wheezing or rhonchi.   Musculoskeletal:        Arms:       Cervical back: Normal range of motion and neck supple.      Lumbar back: Tenderness present. No swelling or deformity. Positive right straight leg raise test. Negative left straight leg raise test.        Back:       Right knee: Bony tenderness present. No swelling, deformity or ecchymosis. Normal range of motion. No LCL laxity, MCL laxity or ACL laxity. Normal meniscus.      Instability Tests: Anterior Lachman test negative. Medial Mary test negative and lateral Mary test negative.   Neurological:      Mental Status: He is alert.            Xray - Reviewed and interpreted by me.   No fx     Note by DNP student Mary " Josselin  Physician Attestation   I, Irma Gudino MD, was present with the medical/JARVIS student who participated in the service and in the documentation of the note.  I have verified the history and personally performed the physical exam and medical decision making.  I agree with the assessment and plan of care as documented in the note.      Items personally reviewed: vitals, labs, imaging and agree with the interpretation documented in the note and     Irma Gudino MD

## 2023-02-28 NOTE — TELEPHONE ENCOUNTER
Pending Prescriptions:                       Disp   Refills    sertraline (ZOLOFT) 100 MG tablet         60 tab*0            Sig: Take 2 tablets (200 mg) by mouth daily

## 2023-02-28 NOTE — LETTER
[facility]  [address] [address]  [phone]          March 7, 2023    Artis Chou                                                                                                                     [facility] [place] [address]            Dear Artis,    Your provider has sent a  jadiel refill of Zoloft You are due for an appointment for further refills.  Please contact the clinic to schedule an appointment for further refills.             Sincerely,         Elen Box PA-C

## 2023-03-01 RX ORDER — SERTRALINE HYDROCHLORIDE 100 MG/1
200 TABLET, FILM COATED ORAL DAILY
Qty: 60 TABLET | Refills: 0 | Status: SHIPPED | OUTPATIENT
Start: 2023-03-01 | End: 2023-03-10

## 2023-03-01 NOTE — TELEPHONE ENCOUNTER
Refilled for a month.  Needs follow up -please call did not read last mychart msg.    Elen Box PA-C

## 2023-03-13 ENCOUNTER — IMMUNIZATION (OUTPATIENT)
Dept: FAMILY MEDICINE | Facility: CLINIC | Age: 28
End: 2023-03-13
Payer: COMMERCIAL

## 2023-03-13 PROCEDURE — 0124A COVID-19 VACCINE BIVALENT BOOSTER 12+ (PFIZER): CPT

## 2023-03-13 PROCEDURE — 91312 COVID-19 VACCINE BIVALENT BOOSTER 12+ (PFIZER): CPT

## 2023-04-18 DIAGNOSIS — F41.1 GAD (GENERALIZED ANXIETY DISORDER): ICD-10-CM

## 2023-04-19 RX ORDER — GABAPENTIN 300 MG/1
300 CAPSULE ORAL AT BEDTIME
Qty: 30 CAPSULE | Refills: 0 | Status: SHIPPED | OUTPATIENT
Start: 2023-04-19 | End: 2023-05-19

## 2023-04-20 ENCOUNTER — TELEPHONE (OUTPATIENT)
Dept: INTERNAL MEDICINE | Facility: CLINIC | Age: 28
End: 2023-04-20

## 2023-04-20 ENCOUNTER — OFFICE VISIT (OUTPATIENT)
Dept: INTERNAL MEDICINE | Facility: CLINIC | Age: 28
End: 2023-04-20
Payer: OTHER MISCELLANEOUS

## 2023-04-20 VITALS
OXYGEN SATURATION: 100 % | TEMPERATURE: 97.7 F | HEIGHT: 68 IN | SYSTOLIC BLOOD PRESSURE: 137 MMHG | RESPIRATION RATE: 15 BRPM | DIASTOLIC BLOOD PRESSURE: 85 MMHG | HEART RATE: 63 BPM | BODY MASS INDEX: 27.28 KG/M2 | WEIGHT: 180 LBS

## 2023-04-20 DIAGNOSIS — F90.0 ATTENTION DEFICIT HYPERACTIVITY DISORDER, INATTENTIVE TYPE, MODERATE: Primary | ICD-10-CM

## 2023-04-20 DIAGNOSIS — M54.50 ACUTE MIDLINE LOW BACK PAIN WITHOUT SCIATICA: ICD-10-CM

## 2023-04-20 PROCEDURE — 99214 OFFICE O/P EST MOD 30 MIN: CPT | Performed by: INTERNAL MEDICINE

## 2023-04-20 RX ORDER — DEXTROAMPHETAMINE SACCHARATE, AMPHETAMINE ASPARTATE MONOHYDRATE, DEXTROAMPHETAMINE SULFATE AND AMPHETAMINE SULFATE 5; 5; 5; 5 MG/1; MG/1; MG/1; MG/1
20 CAPSULE, EXTENDED RELEASE ORAL DAILY
Qty: 30 CAPSULE | Refills: 0 | Status: SHIPPED | OUTPATIENT
Start: 2023-04-20 | End: 2023-06-12

## 2023-04-20 RX ORDER — DEXTROAMPHETAMINE SACCHARATE, AMPHETAMINE ASPARTATE, DEXTROAMPHETAMINE SULFATE AND AMPHETAMINE SULFATE 1.25; 1.25; 1.25; 1.25 MG/1; MG/1; MG/1; MG/1
5 TABLET ORAL DAILY
Qty: 30 TABLET | Refills: 0 | Status: SHIPPED | OUTPATIENT
Start: 2023-04-20 | End: 2024-01-08

## 2023-04-20 ASSESSMENT — PATIENT HEALTH QUESTIONNAIRE - PHQ9
SUM OF ALL RESPONSES TO PHQ QUESTIONS 1-9: 5
10. IF YOU CHECKED OFF ANY PROBLEMS, HOW DIFFICULT HAVE THESE PROBLEMS MADE IT FOR YOU TO DO YOUR WORK, TAKE CARE OF THINGS AT HOME, OR GET ALONG WITH OTHER PEOPLE: SOMEWHAT DIFFICULT
SUM OF ALL RESPONSES TO PHQ QUESTIONS 1-9: 5

## 2023-04-20 ASSESSMENT — PAIN SCALES - GENERAL: PAINLEVEL: SEVERE PAIN (7)

## 2023-04-20 NOTE — TELEPHONE ENCOUNTER
Prior Authorization Request    1. Prior Authorization for the medication amphetamine-dextroamphetamine (ADDERALL) 5 MG tablet        Requesting Provider: Elen Box   Pharmacy name and location: [facility] [address]        Pt name: Artis Chou        Pt : 1995        Pt MRN: [id number]        Last Office Visit: 2023           Insurance: Payor: PREFERREDONE / Plan: AET[employer] PREFERREDONE / Product Type: PPO /         Insurance ID Number: [unfilled]        Prior Auth Contact Phone number:     BIN#:   PCN#:         CMM KEY:      Informed patient that prior authorizations can take up to 10 business days  for response NA

## 2023-04-20 NOTE — PROGRESS NOTES
" ASSESSMENT AND PLAN:    1. Attention deficit hyperactivity disorder, inattentive type, moderate  Medication refilled.     2. Acute midline low back pain without sciatica  History and exam are consistent with myofascial injury and lumbar disc sprain. It is already improving.  He can use conservative measures with ibuprofen, heat, and careful posture.  RTW without restrictions on 4/24/2023.  Follow prn.     CHIEF COMPLAINT:  Acute back pain    HISTORY OF PRESENT ILLNESS:  Artis Chou is a 28 year old male with acute back pain yesterday while working and doing some lifting, he works as a .  The pain is midline, does not radiate into the legs or buttocks or hips.  No numbness or foot weakness.  Already starting to improve. No history of serious back injury.     REVIEW OF SYSTEMS:   See HPI, all other systems on review are negative.    Past Medical History:   Diagnosis Date     Acute midline low back pain without sciatica 4/20/2023     NO ACTIVE PROBLEMS      History   Smoking Status     Never   Smokeless Tobacco     Never     Family History   Problem Relation Age of Onset     Gestational Diabetes Mother      Depression Father      Cancer Maternal Grandmother         lung     Heart Disease Maternal Grandfather      Hypertension Paternal Grandmother      Other Cancer Paternal Grandmother      Hypertension Paternal Grandfather      Past Surgical History:   Procedure Laterality Date     NO HISTORY OF SURGERY       VITALS:  Vitals:    04/20/23 1026 04/20/23 1033   BP: (!) 140/90 137/85   BP Location: Left arm    Patient Position: Sitting    Cuff Size: Adult Regular    Pulse: 63    Resp: 15    Temp: 97.7  F (36.5  C)    TempSrc: Oral    SpO2: 100%    Weight: 81.6 kg (180 lb)    Height: 1.715 m (5' 7.5\")      Wt Readings from Last 3 Encounters:   04/20/23 81.6 kg (180 lb)   02/28/23 82.1 kg (181 lb)   02/14/22 79.4 kg (175 lb)     PHYSICAL EXAM:  Constitutional:  In NAD, alert and oriented  Extremities: tender in " midline, paralumbar muscles at L4.5 level. SLR positive bilaterally at 60 degrees, reflexes are normal, walks on heels and toes.   Neurologic:  Speech clear, no arm or leg weakness, gait normal     Psychiatric:  Mood and behavior appropriate, thinking is clear.     DECISION TO OBTAIN OLD RECORDS AND/OR OBTAIN HISTORY FROM SOMEONE OTHER THAN PATIENT, AND/OR ACCESSING CARE EVERYWHERE):  1  0     REVIEW AND SUMMARIZATION OF OLD RECORDS, AND/OR OBTAINING HISTORY FROM SOMEONE OTHER THAN PATIENT, AND/OR DISCUSSION OF CASE WITH ANOTHER HEALTH CARE PROVIDER:  2 0    REVIEW AND/OR ORDER OF OF CLINICAL LAB TESTS: 1  0.    REVIEW AND/OR ORDER OF RADIOLOGY TESTS: 1 reviewed past lumbar xrays. .    REVIEW AND/OR ORDER OF MEDICAL TESTS (EKG/ECHO/COLONOSCOPY/EGD): 1  0    INDEPENDENT  VISUALIZATION OF IMAGE, TRACING, OR SPECIMEN ITSELF (2 EACH):  0     TOTAL: 1    Current Outpatient Medications   Medication Sig Dispense Refill     diclofenac (VOLTAREN) 75 MG EC tablet Take 1 tablet (75 mg) by mouth 2 times daily as needed for moderate pain (4-6) 60 tablet 1     gabapentin (NEURONTIN) 300 MG capsule Take 1 capsule (300 mg) by mouth At Bedtime +++NO FURTHER REFILLS UNTIL YOU ARE SEEN IN CLINIC +++ 30 capsule 0     nifedipine 0.2% in white petrolatum 0.2 % OINT ointment Apply topically 2 times daily 100 g 0     sertraline (ZOLOFT) 100 MG tablet Take 2 tablets (200 mg) by mouth daily 180 tablet 0     mirtazapine (REMERON) 7.5 MG tablet Take 1 tablet (7.5 mg) by mouth At Bedtime (Patient not taking: Reported on 2/28/2023) 90 tablet 1     Shon Kuo MD  Internal Medicine  [facility]

## 2023-04-20 NOTE — LETTER
April 20, 2023      Artis Chou  [address] [place] [address]        To Whom It May Concern:    Artis Chou was seen in our clinic. He has suffered a myosfascial back injury.  He is unable to work due to this condition from April 19 until April 24, 2023, when he can return to work without restrictions. ithout restrictions.      Sincerely,        Shon Kuo MD

## 2023-04-25 NOTE — TELEPHONE ENCOUNTER
Prior Authorization Approval    Authorization Effective Date: 4/25/2023  Authorization Expiration Date: 4/24/2024  Medication: amphetamine-dextroamphetamine (ADDERALL) 5 MG tablet  Approved Dose/Quantity:    Reference #:     Insurance Company: [employer] Phone [phone] Fax [phone]  Expected CoPay:       CoPay Card Available:      Foundation Assistance Needed:    Which Pharmacy is filling the prescription (Not needed for infusion/clinic administered): [id number] - [place], [place] - [address] [facility]  Pharmacy Notified: Yes  Patient Notified: Comment:  Pharamcy will notify patient

## 2023-04-25 NOTE — TELEPHONE ENCOUNTER
Central Prior Authorization Team   Phone: [phone]    PA Initiation    Medication: amphetamine-dextroamphetamine (ADDERALL) 5 MG tablet  Insurance Company: [employer] - Phone [phone] Fax [phone]  Pharmacy Filling the Rx: [id number] - [place], [place] - [address] AT [address] [facility]  Filling Pharmacy Phone: [phone]  Filling Pharmacy Fax:    Start Date: 4/25/2023

## 2023-05-13 DIAGNOSIS — F41.1 GAD (GENERALIZED ANXIETY DISORDER): ICD-10-CM

## 2023-05-15 RX ORDER — GABAPENTIN 300 MG/1
CAPSULE ORAL
Qty: 30 CAPSULE | Refills: 0 | OUTPATIENT
Start: 2023-05-15

## 2023-05-15 NOTE — TELEPHONE ENCOUNTER
called patient no answer left message to return call to the clinic    Patient needs an appointment before any further refills can be given     Thank you  LS

## 2023-05-17 NOTE — TELEPHONE ENCOUNTER
I sent a MyChart to the patient letting him know he needs an appointment before he will get a refill on his medication. If the patient calls please assist with making an appointment.    Merry Baez [facility]
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
## 2023-06-07 DIAGNOSIS — M54.50 ACUTE RIGHT-SIDED LOW BACK PAIN WITHOUT SCIATICA: ICD-10-CM

## 2023-06-07 DIAGNOSIS — R07.81 RIB PAIN ON RIGHT SIDE: ICD-10-CM

## 2023-06-07 RX ORDER — DICLOFENAC SODIUM 75 MG/1
75 TABLET, DELAYED RELEASE ORAL 2 TIMES DAILY PRN
Qty: 60 TABLET | Refills: 1 | Status: SHIPPED | OUTPATIENT
Start: 2023-06-07 | End: 2024-06-07

## 2023-07-03 ENCOUNTER — OFFICE VISIT (OUTPATIENT)
Dept: FAMILY MEDICINE | Facility: CLINIC | Age: 28
End: 2023-07-03
Payer: COMMERCIAL

## 2023-07-03 VITALS
BODY MASS INDEX: 27.89 KG/M2 | DIASTOLIC BLOOD PRESSURE: 79 MMHG | HEIGHT: 68 IN | OXYGEN SATURATION: 96 % | SYSTOLIC BLOOD PRESSURE: 117 MMHG | RESPIRATION RATE: 18 BRPM | HEART RATE: 75 BPM | TEMPERATURE: 98.4 F | WEIGHT: 184 LBS

## 2023-07-03 DIAGNOSIS — G47.9 SLEEP DISORDER: ICD-10-CM

## 2023-07-03 DIAGNOSIS — F33.1 MAJOR DEPRESSIVE DISORDER, RECURRENT EPISODE, MODERATE (H): ICD-10-CM

## 2023-07-03 DIAGNOSIS — G44.209 TENSION HEADACHE: ICD-10-CM

## 2023-07-03 DIAGNOSIS — F41.1 GAD (GENERALIZED ANXIETY DISORDER): ICD-10-CM

## 2023-07-03 DIAGNOSIS — Z00.00 ROUTINE HISTORY AND PHYSICAL EXAMINATION OF ADULT: Primary | ICD-10-CM

## 2023-07-03 DIAGNOSIS — F90.0 ATTENTION DEFICIT HYPERACTIVITY DISORDER, INATTENTIVE TYPE, MODERATE: ICD-10-CM

## 2023-07-03 PROCEDURE — 99395 PREV VISIT EST AGE 18-39: CPT | Mod: 25 | Performed by: PHYSICIAN ASSISTANT

## 2023-07-03 PROCEDURE — 99214 OFFICE O/P EST MOD 30 MIN: CPT | Mod: 25 | Performed by: PHYSICIAN ASSISTANT

## 2023-07-03 RX ORDER — DEXTROAMPHETAMINE SACCHARATE, AMPHETAMINE ASPARTATE MONOHYDRATE, DEXTROAMPHETAMINE SULFATE AND AMPHETAMINE SULFATE 5; 5; 5; 5 MG/1; MG/1; MG/1; MG/1
20 CAPSULE, EXTENDED RELEASE ORAL DAILY
Qty: 30 CAPSULE | Refills: 0 | Status: SHIPPED | OUTPATIENT
Start: 2023-09-03 | End: 2023-09-23

## 2023-07-03 RX ORDER — DEXTROAMPHETAMINE SACCHARATE, AMPHETAMINE ASPARTATE MONOHYDRATE, DEXTROAMPHETAMINE SULFATE AND AMPHETAMINE SULFATE 5; 5; 5; 5 MG/1; MG/1; MG/1; MG/1
20 CAPSULE, EXTENDED RELEASE ORAL DAILY
Qty: 30 CAPSULE | Refills: 0 | Status: SHIPPED | OUTPATIENT
Start: 2023-08-03 | End: 2023-09-02

## 2023-07-03 RX ORDER — DEXTROAMPHETAMINE SACCHARATE, AMPHETAMINE ASPARTATE, DEXTROAMPHETAMINE SULFATE AND AMPHETAMINE SULFATE 1.25; 1.25; 1.25; 1.25 MG/1; MG/1; MG/1; MG/1
5 TABLET ORAL DAILY
Qty: 30 TABLET | Refills: 0 | Status: SHIPPED | OUTPATIENT
Start: 2023-08-03 | End: 2023-09-02

## 2023-07-03 RX ORDER — SERTRALINE HYDROCHLORIDE 100 MG/1
200 TABLET, FILM COATED ORAL DAILY
Qty: 180 TABLET | Refills: 1 | Status: SHIPPED | OUTPATIENT
Start: 2023-07-03 | End: 2024-01-02

## 2023-07-03 RX ORDER — METHOCARBAMOL 500 MG/1
500 TABLET, FILM COATED ORAL 4 TIMES DAILY PRN
Qty: 30 TABLET | Refills: 1 | Status: SHIPPED | OUTPATIENT
Start: 2023-07-03 | End: 2023-11-03

## 2023-07-03 RX ORDER — DEXTROAMPHETAMINE SACCHARATE, AMPHETAMINE ASPARTATE, DEXTROAMPHETAMINE SULFATE AND AMPHETAMINE SULFATE 1.25; 1.25; 1.25; 1.25 MG/1; MG/1; MG/1; MG/1
5 TABLET ORAL DAILY
Qty: 30 TABLET | Refills: 0 | Status: SHIPPED | OUTPATIENT
Start: 2023-09-03 | End: 2023-10-03

## 2023-07-03 RX ORDER — DEXTROAMPHETAMINE SACCHARATE, AMPHETAMINE ASPARTATE MONOHYDRATE, DEXTROAMPHETAMINE SULFATE AND AMPHETAMINE SULFATE 5; 5; 5; 5 MG/1; MG/1; MG/1; MG/1
20 CAPSULE, EXTENDED RELEASE ORAL DAILY
Qty: 30 CAPSULE | Refills: 0 | Status: SHIPPED | OUTPATIENT
Start: 2023-07-03 | End: 2023-08-02

## 2023-07-03 RX ORDER — DEXTROAMPHETAMINE SACCHARATE, AMPHETAMINE ASPARTATE, DEXTROAMPHETAMINE SULFATE AND AMPHETAMINE SULFATE 1.25; 1.25; 1.25; 1.25 MG/1; MG/1; MG/1; MG/1
5 TABLET ORAL DAILY
Qty: 30 TABLET | Refills: 0 | Status: SHIPPED | OUTPATIENT
Start: 2023-07-03 | End: 2023-08-02

## 2023-07-03 ASSESSMENT — ENCOUNTER SYMPTOMS
NERVOUS/ANXIOUS: 0
DYSURIA: 0
HEMATURIA: 0
ABDOMINAL PAIN: 0
FREQUENCY: 1
WEAKNESS: 0
FEVER: 0
DIARRHEA: 1
CHILLS: 0
MYALGIAS: 1
NAUSEA: 0
PARESTHESIAS: 0
HEMATOCHEZIA: 0
SORE THROAT: 0
EYE PAIN: 0
DIZZINESS: 0
JOINT SWELLING: 0
COUGH: 0
SHORTNESS OF BREATH: 0
ARTHRALGIAS: 0
HEARTBURN: 0
PALPITATIONS: 0
CONSTIPATION: 1
HEADACHES: 1

## 2023-07-03 NOTE — PATIENT INSTRUCTIONS
Turn the tv off when you are falling asleep  Try to get 30min of exercise daily   Try muscle relaxant for headaches   Patient Education

## 2023-07-03 NOTE — PROGRESS NOTES
SUBJECTIVE:   CC: Artis is an 28 year old who presents for preventative health visit.       7/3/2023    11:54 AM   Additional Questions   Roomed by Merry OLSEN CMA     Healthy Habits:     Getting at least 3 servings of Calcium per day:  NO    Bi-annual eye exam:  NO    Dental care twice a year:  NO    Sleep apnea or symptoms of sleep apnea:  Daytime drowsiness    Diet:  Regular (no restrictions)    Frequency of exercise:  1 day/week    Duration of exercise:  Less than 15 minutes    Taking medications regularly:  Yes    Medication side effects:  None    Additional concerns today:  Yes      Has some social anxiety.  Still waking up at night.  Does think gabapentin has helped with falling asleep but not staying asleep. Thinks it could be due to him smoking marijuana.     Wakes up at 3/4am and can go back to sleep but still wakes up again a few hours later and would like to sleep longer.    Bedtime 10pm falls alseep 11-12.  Doesn't turn off TV when he starts to fall asleep.    Mirtazapine was too expensive.  Tried trazodone too.-caused side effects.     Denies adderall contributing to trouble with sleep              Social History     Tobacco Use     Smoking status: Never     Smokeless tobacco: Never   Substance Use Topics     Alcohol use: Yes     Comment: rarely             7/3/2023    11:49 AM   Alcohol Use   Prescreen: >3 drinks/day or >7 drinks/week? No       Last PSA: No results found for: PSA    Reviewed orders with patient. Reviewed health maintenance and updated orders accordingly - Yes  Labs reviewed in EPIC    Reviewed and updated as needed this visit by clinical staff   Tobacco  Allergies  Meds              Reviewed and updated as needed this visit by Provider    Allergies  Meds                 Review of Systems   Constitutional: Negative for chills and fever.   HENT: Negative for congestion, ear pain, hearing loss and sore throat.    Eyes: Negative for pain and visual disturbance.   Respiratory:  "Negative for cough and shortness of breath.    Cardiovascular: Positive for chest pain (tightness.  no triggers noted.  ). Negative for palpitations and peripheral edema.   Gastrointestinal: Positive for constipation (thinks it is from diet ) and diarrhea. Negative for abdominal pain, heartburn, hematochezia and nausea.   Genitourinary: Positive for frequency (drinking more soda ) and urgency. Negative for dysuria, genital sores, hematuria, impotence and penile discharge.   Musculoskeletal: Positive for myalgias. Negative for arthralgias and joint swelling.   Skin: Negative for rash.   Neurological: Positive for headaches (tense neck and temples daily, recently ). Negative for dizziness, weakness and paresthesias.   Psychiatric/Behavioral: Negative for mood changes. The patient is not nervous/anxious.          OBJECTIVE:   /79 (BP Location: Left arm, Patient Position: Chair, Cuff Size: Adult Large)   Pulse 75   Temp 98.4  F (36.9  C) (Oral)   Resp 18   Ht 1.721 m (5' 7.75\")   Wt 83.5 kg (184 lb)   SpO2 96%   BMI 28.18 kg/m      Physical Exam  Constitutional:       General: He is not in acute distress.     Appearance: He is well-developed.   HENT:      Right Ear: Tympanic membrane, ear canal and external ear normal.      Left Ear: Tympanic membrane, ear canal and external ear normal.      Mouth/Throat:      Mouth: No oral lesions.      Pharynx: No oropharyngeal exudate.   Eyes:      Conjunctiva/sclera: Conjunctivae normal.   Neck:      Thyroid: No thyromegaly.      Trachea: No tracheal deviation.   Cardiovascular:      Rate and Rhythm: Normal rate and regular rhythm.      Heart sounds: Normal heart sounds, S1 normal and S2 normal. No murmur heard.     No S3 or S4 sounds.   Pulmonary:      Effort: Pulmonary effort is normal. No respiratory distress.      Breath sounds: Normal breath sounds. No wheezing or rales.   Abdominal:      General: Bowel sounds are normal.      Palpations: Abdomen is soft. There is " no mass.      Tenderness: There is no abdominal tenderness.   Musculoskeletal:         General: No deformity. Normal range of motion.      Cervical back: Neck supple.      Lumbar back: No tenderness.   Lymphadenopathy:      Cervical: No cervical adenopathy.   Skin:     General: Skin is warm and dry.      Findings: No lesion or rash.   Neurological:      Mental Status: He is alert and oriented to person, place, and time.      Motor: No weakness or abnormal muscle tone.      Deep Tendon Reflexes:      Reflex Scores:       Patellar reflexes are 3+ on the right side and 3+ on the left side.  Psychiatric:         Speech: Speech normal.         Thought Content: Thought content normal.         Judgment: Judgment normal.           Diagnostic Test Results:  Labs reviewed in Epic    ASSESSMENT/PLAN:   (Z00.00) Routine history and physical examination of adult  (primary encounter diagnosis)  Comment:   Plan: monitor chest tightness.  Suspect from smoking-encouraged him to cut back      (F41.1) KATY (generalized anxiety disorder)  Comment:   Plan: sertraline (ZOLOFT) 100 MG tablet        No changes for now.  Mostly social anxiety.  Consider therapy again in future  Discussed sleep hygiene     (F33.1) Major depressive disorder, recurrent episode, moderate (H)  Comment:   Plan: sertraline (ZOLOFT) 100 MG tablet        As above    (G44.209) Tension headache  Comment:   Plan: methocarbamol (ROBAXIN) 500 MG tablet        Follow up as needed.  Did not address back pain as it is WC    (G47.9) Sleep disorder  Comment:   Plan: as above.  Turn off TV before falling asleep and increase exercise during the day     (F90.0) Attention deficit hyperactivity disorder, inattentive type, moderate  Comment:   Plan: amphetamine-dextroamphetamine (ADDERALL) 5 MG         tablet, amphetamine-dextroamphetamine         (ADDERALL) 5 MG tablet,         amphetamine-dextroamphetamine (ADDERALL) 5 MG         tablet, amphetamine-dextroamphetamine (ADDERALL         XR) 20 MG 24 hr capsule,         amphetamine-dextroamphetamine (ADDERALL XR) 20         MG 24 hr capsule, amphetamine-dextroamphetamine        (ADDERALL XR) 20 MG 24 hr capsule        Refilled.              COUNSELING:   Reviewed preventive health counseling, as reflected in patient instructions        He reports that he has never smoked. He has never used smokeless tobacco.            Elen Box PA-C  [facility]

## 2023-07-17 ENCOUNTER — MYC REFILL (OUTPATIENT)
Dept: INTERNAL MEDICINE | Facility: CLINIC | Age: 28
End: 2023-07-17
Payer: COMMERCIAL

## 2023-07-17 DIAGNOSIS — F90.0 ATTENTION DEFICIT HYPERACTIVITY DISORDER, INATTENTIVE TYPE, MODERATE: ICD-10-CM

## 2023-07-18 RX ORDER — DEXTROAMPHETAMINE SACCHARATE, AMPHETAMINE ASPARTATE MONOHYDRATE, DEXTROAMPHETAMINE SULFATE AND AMPHETAMINE SULFATE 5; 5; 5; 5 MG/1; MG/1; MG/1; MG/1
20 CAPSULE, EXTENDED RELEASE ORAL DAILY
Qty: 30 CAPSULE | Refills: 0 | Status: CANCELLED | OUTPATIENT
Start: 2023-07-18

## 2023-09-23 ENCOUNTER — MYC REFILL (OUTPATIENT)
Dept: FAMILY MEDICINE | Facility: CLINIC | Age: 28
End: 2023-09-23
Payer: COMMERCIAL

## 2023-09-23 DIAGNOSIS — F90.0 ATTENTION DEFICIT HYPERACTIVITY DISORDER, INATTENTIVE TYPE, MODERATE: ICD-10-CM

## 2023-09-25 RX ORDER — DEXTROAMPHETAMINE SACCHARATE, AMPHETAMINE ASPARTATE MONOHYDRATE, DEXTROAMPHETAMINE SULFATE AND AMPHETAMINE SULFATE 5; 5; 5; 5 MG/1; MG/1; MG/1; MG/1
20 CAPSULE, EXTENDED RELEASE ORAL DAILY
Qty: 30 CAPSULE | Refills: 0 | Status: SHIPPED | OUTPATIENT
Start: 2023-09-25 | End: 2023-11-04

## 2023-10-25 ASSESSMENT — PATIENT HEALTH QUESTIONNAIRE - PHQ9
SUM OF ALL RESPONSES TO PHQ QUESTIONS 1-9: 10
10. IF YOU CHECKED OFF ANY PROBLEMS, HOW DIFFICULT HAVE THESE PROBLEMS MADE IT FOR YOU TO DO YOUR WORK, TAKE CARE OF THINGS AT HOME, OR GET ALONG WITH OTHER PEOPLE: SOMEWHAT DIFFICULT
SUM OF ALL RESPONSES TO PHQ QUESTIONS 1-9: 10

## 2023-10-26 ENCOUNTER — VIRTUAL VISIT (OUTPATIENT)
Dept: FAMILY MEDICINE | Facility: CLINIC | Age: 28
End: 2023-10-26
Payer: COMMERCIAL

## 2023-10-26 DIAGNOSIS — R05.9 COUGH, UNSPECIFIED TYPE: Primary | ICD-10-CM

## 2023-10-26 DIAGNOSIS — R09.81 NASAL CONGESTION: ICD-10-CM

## 2023-10-26 PROCEDURE — 99213 OFFICE O/P EST LOW 20 MIN: CPT | Mod: VID | Performed by: FAMILY MEDICINE

## 2023-10-26 RX ORDER — BENZONATATE 200 MG/1
200 CAPSULE ORAL 3 TIMES DAILY PRN
Qty: 30 CAPSULE | Refills: 0 | Status: SHIPPED | OUTPATIENT
Start: 2023-10-26 | End: 2024-01-08

## 2023-10-26 ASSESSMENT — ENCOUNTER SYMPTOMS: COUGH: 1

## 2023-10-26 NOTE — PROGRESS NOTES
Artis is a 28 year old who is being evaluated via a billable video visit.      How would you like to obtain your AVS? MyChart  If the video visit is dropped, the invitation should be resent by: Text to cell phone: [phone]  Will anyone else be joining your video visit? No          ICD-10-CM    1. Cough, unspecified type  R05.9 benzonatate (TESSALON) 200 MG capsule      2. Nasal congestion  R09.81 benzonatate (TESSALON) 200 MG capsule         Resolving UPPER RESPIRATORY INFECTION sx, he missed work for 3 days and work wants a letter for work  Feels like he is improving, cough med sent , follow up in person.     Subjective   Artis is a 28 year old, presenting for the following health issues:  Cough      History of Present Illness       Reason for visit:  Bad cough, weezing, feels like a weight in my chest  Symptom onset:  3-7 days ago  Symptoms include:  Bad cough, weezing, feels like a weight in my chest  Symptom intensity:  Severe  Symptom progression:  Improving  Had these symptoms before:  No  What makes it worse:  No  What makes it better:  Day/NyQuil    He eats 0-1 servings of fruits and vegetables daily.He consumes 5 sweetened beverage(s) daily.He exercises with enough effort to increase his heart rate 9 or less minutes per day.  He exercises with enough effort to increase his heart rate 3 or less days per week.   He is taking medications regularly.     Throat pain for a week, wheezing, covid neg  No sick contact  A week ago coworker wife was sick  No smoking , thc smoking, no history of lung issues  No fever , cough persisting  Yes mucous        Review of Systems   Respiratory:  Positive for cough.       Constitutional, HEENT, cardiovascular, pulmonary, GI, , musculoskeletal, neuro, skin, endocrine and psych systems are negative, except as otherwise noted.      Objective           Vitals:  No vitals were obtained today due to virtual visit.    Physical Exam   GENERAL: Healthy, alert and no  distress  EYES: Eyes grossly normal to inspection.  No discharge or erythema, or obvious scleral/conjunctival abnormalities.  RESP: No audible wheeze, cough, or visible cyanosis.  No visible retractions or increased work of breathing.    SKIN: Visible skin clear. No significant rash, abnormal pigmentation or lesions.  NEURO: Cranial nerves grossly intact.  Mentation and speech appropriate for age.  PSYCH: Mentation appears normal, affect normal/bright, judgement and insight intact, normal speech and appearance well-groomed.        Video-Visit Details    Type of service:  Video Visit     Originating Location (pt. Location): Home    Distant Location (provider location):  On-site  Platform used for Video Visit: Rae

## 2023-10-26 NOTE — LETTER
October 26, 2023      Artis Chou  [address] [place] [address]        To Whom It May Concern:    Artis Chou was seen in our clinic virtually.  He reports of being ill since Monday, slowly improving.   He reports of missing work 10/23-10/26. Advised going back to work 10/28 after using medication.       Sincerely,          Humza Mckeon DO

## 2023-11-03 DIAGNOSIS — G44.209 TENSION HEADACHE: ICD-10-CM

## 2023-11-03 RX ORDER — METHOCARBAMOL 500 MG/1
500 TABLET, FILM COATED ORAL 4 TIMES DAILY PRN
Qty: 30 TABLET | Refills: 1 | Status: SHIPPED | OUTPATIENT
Start: 2023-11-03 | End: 2024-09-09

## 2023-12-04 ENCOUNTER — IMMUNIZATION (OUTPATIENT)
Dept: FAMILY MEDICINE | Facility: CLINIC | Age: 28
End: 2023-12-04
Payer: COMMERCIAL

## 2023-12-04 DIAGNOSIS — Z23 NEED FOR PROPHYLACTIC VACCINATION AND INOCULATION AGAINST INFLUENZA: ICD-10-CM

## 2023-12-04 DIAGNOSIS — Z23 HIGH PRIORITY FOR 2019-NCOV VACCINE: Primary | ICD-10-CM

## 2023-12-04 PROCEDURE — 90471 IMMUNIZATION ADMIN: CPT

## 2023-12-04 PROCEDURE — 90686 IIV4 VACC NO PRSV 0.5 ML IM: CPT

## 2023-12-04 PROCEDURE — 91320 SARSCV2 VAC 30MCG TRS-SUC IM: CPT

## 2023-12-04 PROCEDURE — 90480 ADMN SARSCOV2 VAC 1/ONLY CMP: CPT

## 2024-01-01 DIAGNOSIS — F33.1 MAJOR DEPRESSIVE DISORDER, RECURRENT EPISODE, MODERATE (H): ICD-10-CM

## 2024-01-01 DIAGNOSIS — F41.1 GAD (GENERALIZED ANXIETY DISORDER): ICD-10-CM

## 2024-01-02 RX ORDER — SERTRALINE HYDROCHLORIDE 100 MG/1
200 TABLET, FILM COATED ORAL DAILY
Qty: 180 TABLET | Refills: 0 | Status: SHIPPED | OUTPATIENT
Start: 2024-01-02 | End: 2024-04-04

## 2024-01-08 ENCOUNTER — VIRTUAL VISIT (OUTPATIENT)
Dept: FAMILY MEDICINE | Facility: CLINIC | Age: 29
End: 2024-01-08
Attending: PHYSICIAN ASSISTANT

## 2024-01-08 DIAGNOSIS — F90.0 ATTENTION DEFICIT HYPERACTIVITY DISORDER, INATTENTIVE TYPE, MODERATE: Primary | ICD-10-CM

## 2024-01-08 DIAGNOSIS — F41.1 GAD (GENERALIZED ANXIETY DISORDER): ICD-10-CM

## 2024-01-08 DIAGNOSIS — G47.9 SLEEPING DIFFICULTIES: ICD-10-CM

## 2024-01-08 DIAGNOSIS — M79.644 PAIN OF RIGHT THUMB: ICD-10-CM

## 2024-01-08 PROCEDURE — 99441 PR PHYSICIAN TELEPHONE EVALUATION 5-10 MIN: CPT | Mod: 93 | Performed by: PHYSICIAN ASSISTANT

## 2024-01-08 RX ORDER — DEXTROAMPHETAMINE SACCHARATE, AMPHETAMINE ASPARTATE, DEXTROAMPHETAMINE SULFATE AND AMPHETAMINE SULFATE 1.25; 1.25; 1.25; 1.25 MG/1; MG/1; MG/1; MG/1
5 TABLET ORAL DAILY
Qty: 30 TABLET | Refills: 0 | Status: SHIPPED | OUTPATIENT
Start: 2024-01-08 | End: 2024-02-07

## 2024-01-08 RX ORDER — DEXTROAMPHETAMINE SACCHARATE, AMPHETAMINE ASPARTATE MONOHYDRATE, DEXTROAMPHETAMINE SULFATE AND AMPHETAMINE SULFATE 5; 5; 5; 5 MG/1; MG/1; MG/1; MG/1
20 CAPSULE, EXTENDED RELEASE ORAL DAILY
Qty: 30 CAPSULE | Refills: 0 | Status: SHIPPED | OUTPATIENT
Start: 2024-01-08 | End: 2024-02-07

## 2024-01-08 RX ORDER — DEXTROAMPHETAMINE SACCHARATE, AMPHETAMINE ASPARTATE MONOHYDRATE, DEXTROAMPHETAMINE SULFATE AND AMPHETAMINE SULFATE 5; 5; 5; 5 MG/1; MG/1; MG/1; MG/1
20 CAPSULE, EXTENDED RELEASE ORAL DAILY
Qty: 30 CAPSULE | Refills: 0 | Status: SHIPPED | OUTPATIENT
Start: 2024-02-08 | End: 2024-03-09

## 2024-01-08 RX ORDER — DEXTROAMPHETAMINE SACCHARATE, AMPHETAMINE ASPARTATE, DEXTROAMPHETAMINE SULFATE AND AMPHETAMINE SULFATE 1.25; 1.25; 1.25; 1.25 MG/1; MG/1; MG/1; MG/1
5 TABLET ORAL DAILY
Qty: 30 TABLET | Refills: 0 | Status: SHIPPED | OUTPATIENT
Start: 2024-02-08 | End: 2024-03-09

## 2024-01-08 RX ORDER — DEXTROAMPHETAMINE SACCHARATE, AMPHETAMINE ASPARTATE MONOHYDRATE, DEXTROAMPHETAMINE SULFATE AND AMPHETAMINE SULFATE 5; 5; 5; 5 MG/1; MG/1; MG/1; MG/1
20 CAPSULE, EXTENDED RELEASE ORAL DAILY
Qty: 30 CAPSULE | Refills: 0 | Status: SHIPPED | OUTPATIENT
Start: 2024-03-10 | End: 2024-04-09

## 2024-01-08 RX ORDER — GABAPENTIN 300 MG/1
CAPSULE ORAL
Qty: 270 CAPSULE | Refills: 1 | Status: SHIPPED | OUTPATIENT
Start: 2024-01-08

## 2024-01-08 RX ORDER — DEXTROAMPHETAMINE SACCHARATE, AMPHETAMINE ASPARTATE, DEXTROAMPHETAMINE SULFATE AND AMPHETAMINE SULFATE 1.25; 1.25; 1.25; 1.25 MG/1; MG/1; MG/1; MG/1
5 TABLET ORAL DAILY
Qty: 30 TABLET | Refills: 0 | Status: SHIPPED | OUTPATIENT
Start: 2024-03-10 | End: 2024-04-09

## 2024-01-08 ASSESSMENT — PATIENT HEALTH QUESTIONNAIRE - PHQ9: SUM OF ALL RESPONSES TO PHQ QUESTIONS 1-9: 5

## 2024-01-08 NOTE — PROGRESS NOTES
"Artis is a 28 year old who is being evaluated via a billable telephone visit.    10:09-10:17  What phone number would you like to be contacted at? [phone]   How would you like to obtain your AVS? Sharmainehart    Distant Location (provider location):  On-site    Assessment & Plan     KATY (generalized anxiety disorder)  Doing better.  No changes   - gabapentin (NEURONTIN) 300 MG capsule; TAKE 2-3 CAPSULES BY MOUTH EVERY NIGHT    Sleeping difficulties  As above  - gabapentin (NEURONTIN) 300 MG capsule; TAKE 2-3 CAPSULES BY MOUTH EVERY NIGHT    Attention deficit hyperactivity disorder, inattentive type, moderate  Refilled.  Follow up in 6 months   - amphetamine-dextroamphetamine (ADDERALL) 5 MG tablet; Take 1 tablet (5 mg) by mouth daily for 30 days  - amphetamine-dextroamphetamine (ADDERALL) 5 MG tablet; Take 1 tablet (5 mg) by mouth daily for 30 days  - amphetamine-dextroamphetamine (ADDERALL) 5 MG tablet; Take 1 tablet (5 mg) by mouth daily for 30 days  - amphetamine-dextroamphetamine (ADDERALL XR) 20 MG 24 hr capsule; Take 1 capsule (20 mg) by mouth daily for 30 days  - amphetamine-dextroamphetamine (ADDERALL XR) 20 MG 24 hr capsule; Take 1 capsule (20 mg) by mouth daily for 30 days  - amphetamine-dextroamphetamine (ADDERALL XR) 20 MG 24 hr capsule; Take 1 capsule (20 mg) by mouth daily for 30 days    Pain of right thumb  Suspect overuse.  Continue to wear brace and rest as able.  If not improving in 1-2 weeks follow up with possible ortho referral.               BMI:   Estimated body mass index is 28.18 kg/m  as calculated from the following:    Height as of 7/3/23: 1.721 m (5' 7.75\").    Weight as of 7/3/23: 83.5 kg (184 lb).   Weight management plan: not addressed        Elen Box PA-C  [facility]    John Wisdom is a 28 year old, presenting for the following health issues:  Recheck Medication and Depression        1/8/2024     9:49 AM   Additional Questions   Roomed " by Anitha   Accompanied by self       HPI     Depression Followup  How are you doing with your depression since your last visit? Improved   Are you having other symptoms that might be associated with depression? No  Have you had a significant life event?  No   Are you feeling anxious or having panic attacks?   No  Do you have any concerns with your use of alcohol or other drugs? No  Mood is improved.  Work improved.    Sleep is normal.  Not bothering him at this point.    Tolerating zoloft and 2 gabapentin at night.  Helps him sleep.    Adderall is working well.  Tolerating well.  No side effects.      About a month ago did some cleaning and now is having wrist and thumb pain -on right.  Moving thumb is painful.  No swelling.  Was using a wrist brace and that caused the pain with the thumb.      Social History     Tobacco Use    Smoking status: Never    Smokeless tobacco: Never   Vaping Use    Vaping Use: Never used   Substance Use Topics    Alcohol use: Yes     Comment: rarely    Drug use: Yes     Types: Marijuana     Comment: carol         6/5/2023    12:01 PM 10/25/2023    10:41 AM 1/8/2024     9:46 AM   PHQ   PHQ-9 Total Score 6 10 5   Q9: Thoughts of better off dead/self-harm past 2 weeks Not at all Not at all Not at all         2/3/2021     9:16 AM 11/17/2021     2:54 PM 6/5/2023    12:00 PM   KATY-7 SCORE   Total Score   1 (minimal anxiety)   Total Score 1 6 1         1/8/2024     9:46 AM   Last PHQ-9   1.  Little interest or pleasure in doing things 0   2.  Feeling down, depressed, or hopeless 0   3.  Trouble falling or staying asleep, or sleeping too much 3   4.  Feeling tired or having little energy 1   5.  Poor appetite or overeating 1   6.  Feeling bad about yourself 0   7.  Trouble concentrating 0   8.  Moving slowly or restless 0   Q9: Thoughts of better off dead/self-harm past 2 weeks 0   PHQ-9 Total Score 5   Difficulty at work, home, or with people Not difficult at all       Suicide Assessment  Five-step Evaluation and Treatment (SAFE-T)    How many servings of fruits and vegetables do you eat daily?  1-2   On average, how many sweetened beverages do you drink each day (Examples: soda, juice, sweet tea, etc.  Do NOT count diet or artificially sweetened beverages)?   5  How many days per week do you exercise enough to make your heart beat faster? 3 or less  How many minutes a day do you exercise enough to make your heart beat faster? 9 or less  How many days per week do you miss taking your medication? 0    Medication Followup of ADDERALL   Taking Medication as prescribed: yes  Side Effects:  None  Medication Helping Symptoms:  yes    R thumb pain since December.               Review of Systems         Objective           Vitals:  No vitals were obtained today due to virtual visit.    Physical Exam   healthy, alert, and no distress  PSYCH: Alert and oriented times 3; coherent speech, normal   rate and volume, able to articulate logical thoughts, able   to abstract reason, no tangential thoughts, no hallucinations   or delusions  His affect is normal  RESP: No cough, no audible wheezing, able to talk in full sentences  Remainder of exam unable to be completed due to telephone visits                Phone call duration: 8 minutes

## 2024-01-22 ENCOUNTER — MYC REFILL (OUTPATIENT)
Dept: FAMILY MEDICINE | Facility: CLINIC | Age: 29
End: 2024-01-22

## 2024-01-22 DIAGNOSIS — F90.0 ATTENTION DEFICIT HYPERACTIVITY DISORDER, INATTENTIVE TYPE, MODERATE: ICD-10-CM

## 2024-01-22 RX ORDER — DEXTROAMPHETAMINE SACCHARATE, AMPHETAMINE ASPARTATE, DEXTROAMPHETAMINE SULFATE AND AMPHETAMINE SULFATE 1.25; 1.25; 1.25; 1.25 MG/1; MG/1; MG/1; MG/1
5 TABLET ORAL DAILY
Qty: 30 TABLET | Refills: 0 | OUTPATIENT
Start: 2024-01-22

## 2024-01-22 RX ORDER — DEXTROAMPHETAMINE SACCHARATE, AMPHETAMINE ASPARTATE MONOHYDRATE, DEXTROAMPHETAMINE SULFATE AND AMPHETAMINE SULFATE 5; 5; 5; 5 MG/1; MG/1; MG/1; MG/1
20 CAPSULE, EXTENDED RELEASE ORAL DAILY
Qty: 30 CAPSULE | Refills: 0 | OUTPATIENT
Start: 2024-01-22

## 2024-02-28 ENCOUNTER — MYC REFILL (OUTPATIENT)
Dept: FAMILY MEDICINE | Facility: CLINIC | Age: 29
End: 2024-02-28
Payer: COMMERCIAL

## 2024-02-28 DIAGNOSIS — F90.0 ATTENTION DEFICIT HYPERACTIVITY DISORDER, INATTENTIVE TYPE, MODERATE: ICD-10-CM

## 2024-02-28 RX ORDER — DEXTROAMPHETAMINE SACCHARATE, AMPHETAMINE ASPARTATE, DEXTROAMPHETAMINE SULFATE AND AMPHETAMINE SULFATE 1.25; 1.25; 1.25; 1.25 MG/1; MG/1; MG/1; MG/1
5 TABLET ORAL DAILY
Qty: 30 TABLET | Refills: 0 | Status: CANCELLED | OUTPATIENT
Start: 2024-02-28

## 2024-02-28 RX ORDER — DEXTROAMPHETAMINE SACCHARATE, AMPHETAMINE ASPARTATE MONOHYDRATE, DEXTROAMPHETAMINE SULFATE AND AMPHETAMINE SULFATE 5; 5; 5; 5 MG/1; MG/1; MG/1; MG/1
20 CAPSULE, EXTENDED RELEASE ORAL DAILY
Qty: 30 CAPSULE | Refills: 0 | OUTPATIENT
Start: 2024-02-28

## 2024-05-08 ENCOUNTER — E-VISIT (OUTPATIENT)
Dept: FAMILY MEDICINE | Facility: CLINIC | Age: 29
End: 2024-05-08
Payer: COMMERCIAL

## 2024-05-08 DIAGNOSIS — F90.0 ATTENTION DEFICIT HYPERACTIVITY DISORDER, INATTENTIVE TYPE, MODERATE: Primary | ICD-10-CM

## 2024-05-08 PROCEDURE — 99207 PR NON-BILLABLE SERV PER CHARTING: CPT | Performed by: PHYSICIAN ASSISTANT

## 2024-05-13 RX ORDER — DEXTROAMPHETAMINE SACCHARATE, AMPHETAMINE ASPARTATE, DEXTROAMPHETAMINE SULFATE AND AMPHETAMINE SULFATE 5; 5; 5; 5 MG/1; MG/1; MG/1; MG/1
20 TABLET ORAL DAILY
COMMUNITY
Start: 2020-08-11 | End: 2024-05-13

## 2024-05-13 RX ORDER — DEXTROAMPHETAMINE SACCHARATE, AMPHETAMINE ASPARTATE, DEXTROAMPHETAMINE SULFATE AND AMPHETAMINE SULFATE 1.25; 1.25; 1.25; 1.25 MG/1; MG/1; MG/1; MG/1
5 TABLET ORAL DAILY
COMMUNITY
Start: 2020-08-11 | End: 2024-06-07

## 2024-05-13 RX ORDER — DEXTROAMPHETAMINE SACCHARATE, AMPHETAMINE ASPARTATE, DEXTROAMPHETAMINE SULFATE AND AMPHETAMINE SULFATE 1.25; 1.25; 1.25; 1.25 MG/1; MG/1; MG/1; MG/1
5 TABLET ORAL DAILY
Qty: 30 TABLET | Refills: 0 | Status: SHIPPED | OUTPATIENT
Start: 2024-05-13

## 2024-05-13 RX ORDER — DEXTROAMPHETAMINE SACCHARATE, AMPHETAMINE ASPARTATE, DEXTROAMPHETAMINE SULFATE AND AMPHETAMINE SULFATE 5; 5; 5; 5 MG/1; MG/1; MG/1; MG/1
20 TABLET ORAL DAILY
Qty: 30 TABLET | Refills: 0 | Status: SHIPPED | OUTPATIENT
Start: 2024-05-13 | End: 2024-05-13

## 2024-05-13 RX ORDER — DEXTROAMPHETAMINE SACCHARATE, AMPHETAMINE ASPARTATE MONOHYDRATE, DEXTROAMPHETAMINE SULFATE AND AMPHETAMINE SULFATE 5; 5; 5; 5 MG/1; MG/1; MG/1; MG/1
20 CAPSULE, EXTENDED RELEASE ORAL DAILY
Qty: 30 CAPSULE | Refills: 0 | Status: SHIPPED | OUTPATIENT
Start: 2024-05-13

## 2024-05-30 ENCOUNTER — TELEPHONE (OUTPATIENT)
Dept: FAMILY MEDICINE | Facility: CLINIC | Age: 29
End: 2024-05-30
Payer: COMMERCIAL

## 2024-05-30 NOTE — TELEPHONE ENCOUNTER
"Pharmacist Debra called from the [facility] \"Professional Practice Team\" to verify current prescription for a controlled substance.      According to Debra, this is a random search to monitor fraudulent prescriptions.   She states the  is Syeda Wills & she can be reached at [phone]. Provider can contact her with any questions about this program.      Will route to ordering provider to let her know about this Audit.       I Verified the following prescription \"on file\" with Debra:  Adderall XR 20 mg  [facility] #[id number] [place], [place]    Flores Hyman RN BSN  [facility]           "

## 2024-06-02 SDOH — HEALTH STABILITY: PHYSICAL HEALTH: ON AVERAGE, HOW MANY MINUTES DO YOU ENGAGE IN EXERCISE AT THIS LEVEL?: 150+ MIN

## 2024-06-02 SDOH — HEALTH STABILITY: PHYSICAL HEALTH: ON AVERAGE, HOW MANY DAYS PER WEEK DO YOU ENGAGE IN MODERATE TO STRENUOUS EXERCISE (LIKE A BRISK WALK)?: 5 DAYS

## 2024-06-02 ASSESSMENT — SOCIAL DETERMINANTS OF HEALTH (SDOH): HOW OFTEN DO YOU GET TOGETHER WITH FRIENDS OR RELATIVES?: ONCE A WEEK

## 2024-06-02 NOTE — COMMUNITY RESOURCES LIST (ENGLISH)
June 2, 2024           YOUR PERSONALIZED LIST OF SERVICES & PROGRAMS           NAVIGATION    Eligibility Screening      [facility] application assistance  [address] [place] [address] (Distance: 6.5 miles)  Language: English  Fee: Free      [facility] Community Health Worker [facility]  [address] [place] [address] (Distance: 5.7 miles)  Phone: [phone]  Email: [email or web address]  Website: https://[email or web address]/  Language: English, Libyan, Romanian  Fee: Free, Insurance      [employer] [place] - SNAP (formerly food stamps) Screening and Application help  Phone: (851) 474-9940  Website: https://www.Chenal Media.org/programs/[place]-food-helpline/  Language: English  Hours: Mon 10:00 AM - 5:00 PM Tue 10:00 AM - 5:00 PM Wed 10:00 AM - 5:00 PM Thu 10:00 AM - 5:00 PM Fri 10:00 AM - 5:00 PM  Fee: Free  Accessibility: Ada accessible, Blind accommodation, Deaf or hard of hearing, Translation services        ASSISTANCE    Nutrition Benefits      [facility] application assistance  [address] [place] [address] (Distance: 6.5 miles)  Language: English  Fee: Free      [facility] application assistance  [address] [place] [address] [place] [address] (Distance: 6.5 miles)  Phone: [phone]  Language: English  Fee: Free  Accessibility: Ada accessible      [employer] [place] - SNAP (formerly food stamps) Screening and Application help  Phone: (479) 524-2228  Website: https://www.[employer].org/programs/[place]-food-helpline/  Language: English  Hours: Mon 10:00 AM - 5:00 PM Tue 10:00 AM - 5:00 PM Wed 10:00 AM - 5:00 PM Thu 10:00 AM - 5:00 PM Fri 10:00 AM - 5:00 PM  Fee: Free  Accessibility: Ada accessible, Blind accommodation, Deaf or hard of hearing, Translation services    [address] [place] [address] (Distance: 3.4 miles)  Phone: [phone]) [phone]  Website: http://francine.org  Language:  English  Fee: Free      [facility] - Food Shelf - [facility]  [address] [place] [address] (Distance: 1.6 miles)  Phone: [phone]  Language: English, Turkish  Fee: Free  Accessibility: Ada accessible      [employer] - [place] [facility] [employer] Shelf  Phone: [phone]) [phone]  Website: www.[facility].org  Language: English, Turkish  Hours: Mon 9:00 AM - 3:30 PM Tue 9:00 AM - 6:30 PM Wed 9:00 AM - 3:30 PM Thu 9:00 AM - 12:30 PM Fri 9:00 AM - 12:30 PM Sat 9:00 AM - 12:00 PM  Fee: Free        & SHELTER    Housing      [facility] - [facility] crisis  [address] [place] [address] (Distance: 13.2 miles)  Phone: [phone]  Language: English      Free - Client Services  [address] [facility] [address] [facility] [address] [facility] [place] [facility] [place] [address] (Distance: 7.7 miles)  Phone: [phone]) [phone]  Website: https://Spiracur/  Language: English  Fee: Free  Transportation Options: Free transportation      Home Health Care [facility]  Phone: [phone] [phone]  Website: https://www.Arbsource/  Language: English, Hmong, Oromo, Sudanese, Turkish  Hours: Mon 9:00 AM - 5:00 PM Tue 9:00 AM - 5:00 PM Wed 9:00 AM - 5:00 PM Thu 9:00 AM - 5:00 PM Fri 9:00 AM - 5:00 PM  Fee: Insurance  Accessibility: Blind accommodation, Deaf or hard of hearing, Translation services  Transportation Options: Free transportation    Case Management      [facility] - Housing Stabilization  [address] [place] [address] (Distance: 5.4 miles)  Phone: [phone]  Language: English  Fee: Self pay, Insurance  Accessibility: Translation services      [facility] Community Health Worker [facility]  [address] [place] [address] (Distance: 5.7 miles)  Phone: [phone]) [phone]  Email: [email or web address]  Website: https://wellshareinternational.org/  Language: English, Sudanese, Nepali  Fee: Free,  Insurance      [employer] - Housing Stabilization Services  Phone: (260) [phone]  Website: https://homebasemn.com/  Language: English  Hours: Mon 8:00 AM - 4:00 PM Tue 8:00 AM - 4:00 PM Wed 8:00 AM - 4:00 PM Thu 8:00 AM - 4:00 PM Fri 8:00 AM - 4:00 PM  Fee: Free  Accessibility: Blind accommodation, Deaf or hard of hearing  Transportation Options: Free transportation    Drop-In Services      [facility] Warming or cooling [facility] & [address] [address] (Distance: 1.6 miles)  Phone: [phone]  Language: English, Papua [place] Guinean  Fee: Free  Accessibility: Ada accessible      [employer] - [employer]  [address] [place] [address] (Distance: 8.8 miles)  Phone: [phone]) [phone]  Language: English  Fee: Free      [facility] POSTAL SERVICE - MAIL SERVICE FOR THE HOMELESS  Phone: (282) [phone]  Website: https://www.Revolv               IMPORTANT NUMBERS & WEBSITES        Emergency Services  911  .   United Way  211 http://211unitedway.org  .   Poison Control  (829) 812-8824 http://[place]poison.org http://wisconsinpoison.org  .     Suicide and Crisis Lifeline  988 http://988lifeline.org  .   Childhelp National Child Abuse Hotline  463.486.1737 http://Childhelphotline.org   .   National Sexual Assault Hotline  (230) 294-2408 (HOPE) http://Rainn.org   .     National Runaway Safeline  (298) 340-1865 (RUNAWAY) http://Shenz[employer] Domain Network SoftwarerunaNeurAxon.org  .   Pregnancy & Postpartum Support  Call/text 657-961-8958  [place]: http://ppsupportmn.org  [place]: http://Profitek.com/wi  .   Substance Abuse National Helpline ([facility])  322-481-HELP (7779) http://Findtreatment.gov   .                DISCLAIMER: These resources have been generated via the MobStac Platform. [employer] does not endorse any service providers mentioned in this resource list. [employer] does not guarantee that the services mentioned in this resource list will be available to you  or will improve your health or wellness.    [facility]

## 2024-06-07 ENCOUNTER — OFFICE VISIT (OUTPATIENT)
Dept: FAMILY MEDICINE | Facility: CLINIC | Age: 29
End: 2024-06-07
Payer: COMMERCIAL

## 2024-06-07 VITALS
SYSTOLIC BLOOD PRESSURE: 141 MMHG | HEART RATE: 72 BPM | RESPIRATION RATE: 16 BRPM | WEIGHT: 202.4 LBS | TEMPERATURE: 96.7 F | HEIGHT: 68 IN | BODY MASS INDEX: 30.68 KG/M2 | DIASTOLIC BLOOD PRESSURE: 93 MMHG | OXYGEN SATURATION: 100 %

## 2024-06-07 DIAGNOSIS — L70.0 ACNE VULGARIS: ICD-10-CM

## 2024-06-07 DIAGNOSIS — Z13.21 ENCOUNTER FOR VITAMIN DEFICIENCY SCREENING: ICD-10-CM

## 2024-06-07 DIAGNOSIS — Z13.220 LIPID SCREENING: ICD-10-CM

## 2024-06-07 DIAGNOSIS — F90.0 ATTENTION DEFICIT HYPERACTIVITY DISORDER, INATTENTIVE TYPE, MODERATE: ICD-10-CM

## 2024-06-07 DIAGNOSIS — Z00.00 ANNUAL PHYSICAL EXAM: Primary | ICD-10-CM

## 2024-06-07 DIAGNOSIS — R03.0 ELEVATED BP WITHOUT DIAGNOSIS OF HYPERTENSION: ICD-10-CM

## 2024-06-07 DIAGNOSIS — M25.531 RIGHT WRIST PAIN: ICD-10-CM

## 2024-06-07 DIAGNOSIS — M25.511 ACUTE PAIN OF RIGHT SHOULDER: ICD-10-CM

## 2024-06-07 DIAGNOSIS — Z13.1 SCREENING FOR DIABETES MELLITUS: ICD-10-CM

## 2024-06-07 PROBLEM — F33.42 RECURRENT MAJOR DEPRESSIVE DISORDER, IN FULL REMISSION (H): Status: ACTIVE | Noted: 2017-03-03

## 2024-06-07 PROCEDURE — 99213 OFFICE O/P EST LOW 20 MIN: CPT | Mod: 25

## 2024-06-07 PROCEDURE — 99395 PREV VISIT EST AGE 18-39: CPT

## 2024-06-07 RX ORDER — DEXTROAMPHETAMINE SACCHARATE, AMPHETAMINE ASPARTATE MONOHYDRATE, DEXTROAMPHETAMINE SULFATE AND AMPHETAMINE SULFATE 5; 5; 5; 5 MG/1; MG/1; MG/1; MG/1
20 CAPSULE, EXTENDED RELEASE ORAL DAILY
Qty: 30 CAPSULE | Refills: 0 | Status: SHIPPED | OUTPATIENT
Start: 2024-06-12 | End: 2024-07-12

## 2024-06-07 RX ORDER — IBUPROFEN 600 MG/1
600 TABLET, FILM COATED ORAL EVERY 8 HOURS PRN
Qty: 90 TABLET | Refills: 0 | Status: SHIPPED | OUTPATIENT
Start: 2024-06-07 | End: 2024-07-08

## 2024-06-07 RX ORDER — DEXTROAMPHETAMINE SACCHARATE, AMPHETAMINE ASPARTATE, DEXTROAMPHETAMINE SULFATE AND AMPHETAMINE SULFATE 1.25; 1.25; 1.25; 1.25 MG/1; MG/1; MG/1; MG/1
5 TABLET ORAL DAILY PRN
Qty: 20 TABLET | Refills: 0 | Status: SHIPPED | OUTPATIENT
Start: 2024-06-11

## 2024-06-07 RX ORDER — DEXTROAMPHETAMINE SACCHARATE, AMPHETAMINE ASPARTATE MONOHYDRATE, DEXTROAMPHETAMINE SULFATE AND AMPHETAMINE SULFATE 5; 5; 5; 5 MG/1; MG/1; MG/1; MG/1
20 CAPSULE, EXTENDED RELEASE ORAL DAILY
Qty: 30 CAPSULE | Refills: 0 | Status: SHIPPED | OUTPATIENT
Start: 2024-08-09 | End: 2024-09-08

## 2024-06-07 RX ORDER — DEXTROAMPHETAMINE SACCHARATE, AMPHETAMINE ASPARTATE, DEXTROAMPHETAMINE SULFATE AND AMPHETAMINE SULFATE 1.25; 1.25; 1.25; 1.25 MG/1; MG/1; MG/1; MG/1
5 TABLET ORAL DAILY PRN
Qty: 20 TABLET | Refills: 0 | Status: SHIPPED | OUTPATIENT
Start: 2024-08-12 | End: 2024-09-01

## 2024-06-07 RX ORDER — TRETINOIN 0.1 MG/G
GEL TOPICAL AT BEDTIME
Qty: 45 G | Refills: 1 | Status: SHIPPED | OUTPATIENT
Start: 2024-06-07

## 2024-06-07 RX ORDER — DEXTROAMPHETAMINE SACCHARATE, AMPHETAMINE ASPARTATE, DEXTROAMPHETAMINE SULFATE AND AMPHETAMINE SULFATE 1.25; 1.25; 1.25; 1.25 MG/1; MG/1; MG/1; MG/1
5 TABLET ORAL DAILY
Qty: 30 TABLET | Refills: 0 | Status: CANCELLED | OUTPATIENT
Start: 2024-06-07

## 2024-06-07 RX ORDER — DEXTROAMPHETAMINE SACCHARATE, AMPHETAMINE ASPARTATE, DEXTROAMPHETAMINE SULFATE AND AMPHETAMINE SULFATE 1.25; 1.25; 1.25; 1.25 MG/1; MG/1; MG/1; MG/1
5 TABLET ORAL DAILY PRN
Qty: 20 TABLET | Refills: 0 | Status: SHIPPED | OUTPATIENT
Start: 2024-07-12

## 2024-06-07 RX ORDER — DEXTROAMPHETAMINE SACCHARATE, AMPHETAMINE ASPARTATE MONOHYDRATE, DEXTROAMPHETAMINE SULFATE AND AMPHETAMINE SULFATE 5; 5; 5; 5 MG/1; MG/1; MG/1; MG/1
20 CAPSULE, EXTENDED RELEASE ORAL DAILY
Qty: 30 CAPSULE | Refills: 0 | Status: CANCELLED | OUTPATIENT
Start: 2024-06-07

## 2024-06-07 RX ORDER — DEXTROAMPHETAMINE SACCHARATE, AMPHETAMINE ASPARTATE MONOHYDRATE, DEXTROAMPHETAMINE SULFATE AND AMPHETAMINE SULFATE 5; 5; 5; 5 MG/1; MG/1; MG/1; MG/1
20 CAPSULE, EXTENDED RELEASE ORAL DAILY
Qty: 30 CAPSULE | Refills: 0 | Status: SHIPPED | OUTPATIENT
Start: 2024-07-12 | End: 2024-08-11

## 2024-06-07 ASSESSMENT — ANXIETY QUESTIONNAIRES
5. BEING SO RESTLESS THAT IT IS HARD TO SIT STILL: NOT AT ALL
GAD7 TOTAL SCORE: 0
3. WORRYING TOO MUCH ABOUT DIFFERENT THINGS: NOT AT ALL
6. BECOMING EASILY ANNOYED OR IRRITABLE: NOT AT ALL
GAD7 TOTAL SCORE: 0
2. NOT BEING ABLE TO STOP OR CONTROL WORRYING: NOT AT ALL
1. FEELING NERVOUS, ANXIOUS, OR ON EDGE: NOT AT ALL
7. FEELING AFRAID AS IF SOMETHING AWFUL MIGHT HAPPEN: NOT AT ALL
IF YOU CHECKED OFF ANY PROBLEMS ON THIS QUESTIONNAIRE, HOW DIFFICULT HAVE THESE PROBLEMS MADE IT FOR YOU TO DO YOUR WORK, TAKE CARE OF THINGS AT HOME, OR GET ALONG WITH OTHER PEOPLE: NOT DIFFICULT AT ALL

## 2024-06-07 ASSESSMENT — PATIENT HEALTH QUESTIONNAIRE - PHQ9
SUM OF ALL RESPONSES TO PHQ QUESTIONS 1-9: 5
5. POOR APPETITE OR OVEREATING: NOT AT ALL

## 2024-06-07 NOTE — PROGRESS NOTES
Preventive Care Visit  [facility] Center[facility] CNP, Family Medicine  Jun 7, 2024      Assessment & Plan     Annual physical exam  Declines labs today given phobia of needles- will schedule lab draw     Attention deficit hyperactivity disorder, inattentive type, moderate  - amphetamine-dextroamphetamine (ADDERALL XR) 20 MG 24 hr capsule  Dispense: 30 capsule; Refill: 0  - amphetamine-dextroamphetamine (ADDERALL XR) 20 MG 24 hr capsule  Dispense: 30 capsule; Refill: 0  - amphetamine-dextroamphetamine (ADDERALL XR) 20 MG 24 hr capsule  Dispense: 30 capsule; Refill: 0  - amphetamine-dextroamphetamine (ADDERALL) 5 MG tablet  Dispense: 20 tablet; Refill: 0  - amphetamine-dextroamphetamine (ADDERALL) 5 MG tablet  Dispense: 20 tablet; Refill: 0  - amphetamine-dextroamphetamine (ADDERALL) 5 MG tablet  Dispense: 20 tablet; Refill: 0    Elevated BP without diagnosis of hypertension  Rambo home BP BID as discussed and report findings    Screening for diabetes mellitus  - Comprehensive metabolic panel    Lipid screening  - Lipid panel reflex to direct LDL Fasting    Acute pain of right shoulder  - Physical Therapy  Referral    Right wrist pain  - ibuprofen (ADVIL/MOTRIN) 600 MG tablet  Dispense: 90 tablet; Refill: 0  - continue bracing, if not improved would consider hand ref     Encounter for vitamin deficiency screening  - Vitamin D Deficiency    Acne vulgaris  - tretinoin (RETIN-A) 0.01 % external gel  Dispense: 45 g; Refill: 1    Counseling  Appropriate preventive services were discussed with this patient, including applicable screening as appropriate for fall prevention, nutrition, physical activity, Tobacco-use cessation, weight loss and cognition.  Checklist reviewing preventive services available has been given to the patient.  Reviewed patient's diet, addressing concerns and/or questions.   The patient was instructed to see the dentist every 6 months.   He is at risk for psychosocial  distress and has been provided with information to reduce risk.     Follow up in 90 days for med check or sooner with concerns       Subjective   Artis is a 29 year old, presenting for the following:  Physical (Annual/Right shoulder and wrist pain)    Pt reports for routine annual and med refill.     6mo hx R wrist pain. Described as sore, occasional sharp. Denies numb/ting/weak. No known prior injuries. Improved with motrin.    Several years of intermittent R shoulder pain. Waxes and wanes- currently doing well. Denies- numb/ting/weak No known prior injures. Describes as soreness with overhead extensions. No ROM limitations.    Pt acutely hypertensive on exam, does not currently monitor BP at home but reports he is open to this.     Pt currently on adderall 20XR every day  and 5IR PRN for ADHD. Reports this is working well. Endorses symptom control, largely takes IR on weekdays- ok with 20 tabs per month. Denies anxiety, insomnia, palpations.          6/7/2024     3:03 PM   Additional Questions   Roomed by shane        Health Care Directive  Patient does not have a Health Care Directive or Living Will: Discussed advance care planning with patient; however, patient declined at this time.    HPI        6/2/2024   General Health   How would you rate your overall physical health? (!) FAIR   Feel stress (tense, anxious, or unable to sleep) Only a little   (!) STRESS CONCERN      6/2/2024   Nutrition   Three or more servings of calcium each day? (!) NO   Diet: Regular (no restrictions)   How many servings of fruit and vegetables per day? (!) 0-1   How many sweetened beverages each day? (!) 4+         6/2/2024   Exercise   Days per week of moderate/strenous exercise 5 days   Average minutes spent exercising at this level 150+ min         6/2/2024   Social Factors   Frequency of gathering with friends or relatives Once a week   Worry food won't last until get money to buy more Yes   Food not last or not have enough  "money for food? Yes   Do you have housing?  No   Are you worried about losing your housing? No   Lack of transportation? No   Unable to get utilities (heat,electricity)? No   Want help with housing or utility concern? No   (!) FOOD SECURITY CONCERN PRESENT(!) HOUSING CONCERN PRESENT      6/2/2024   Dental   Dentist two times every year? (!) NO         6/2/2024   TB Screening   Were you born outside of the [place]? No           6/2/2024   Substance Use   Alcohol more than 3/day or more than 7/wk No   Do you use any other substances recreationally? (!) CANNABIS PRODUCTS     Social History     Tobacco Use    Smoking status: Never    Smokeless tobacco: Never   Vaping Use    Vaping status: Never Used   Substance Use Topics    Alcohol use: Not Currently     Comment: rarely    Drug use: Yes     Types: Marijuana     Comment: purvijuna         6/2/2024   STI Screening   New sexual partner(s) since last STI/HIV test? (!) YES - declines         6/2/2024   Contraception/Family Planning   Questions about contraception or family planning No       Review of Systems  Constitutional, HEENT, cardiovascular, pulmonary, gi and gu systems are negative, except as otherwise noted.     Objective    Exam  BP (!) 141/93   Pulse 72   Temp (!) 96.7  F (35.9  C) (Temporal)   Resp 16   Ht 1.73 m (5' 8.11\")   Wt 91.8 kg (202 lb 6.4 oz)   SpO2 100%   BMI 30.68 kg/m     Estimated body mass index is 30.68 kg/m  as calculated from the following:    Height as of this encounter: 1.73 m (5' 8.11\").    Weight as of this encounter: 91.8 kg (202 lb 6.4 oz).    Physical Exam  GENERAL: alert and no distress  NECK: no adenopathy, no asymmetry, masses, or scars  RESP: lungs clear to auscultation - no rales, rhonchi or wheezes  CV: regular rate and rhythm, normal S1 S2, no S3 or S4, no murmur, click or rub, no peripheral edema  ABDOMEN: soft, nontender, no hepatosplenomegaly, no masses and bowel sounds normal  MS: no gross musculoskeletal defects noted, no " edema        Signed Electronically by: GIULIANO Kumar CNP

## 2024-07-05 DIAGNOSIS — F33.1 MAJOR DEPRESSIVE DISORDER, RECURRENT EPISODE, MODERATE (H): ICD-10-CM

## 2024-07-05 DIAGNOSIS — F41.1 GAD (GENERALIZED ANXIETY DISORDER): ICD-10-CM

## 2024-07-07 DIAGNOSIS — M25.531 RIGHT WRIST PAIN: ICD-10-CM

## 2024-07-08 RX ORDER — IBUPROFEN 600 MG/1
600 TABLET, FILM COATED ORAL EVERY 8 HOURS PRN
Qty: 90 TABLET | Refills: 0 | Status: SHIPPED | OUTPATIENT
Start: 2024-07-08

## 2024-07-08 RX ORDER — SERTRALINE HYDROCHLORIDE 100 MG/1
200 TABLET, FILM COATED ORAL DAILY
Qty: 180 TABLET | Refills: 0 | Status: SHIPPED | OUTPATIENT
Start: 2024-07-08

## 2024-07-18 ENCOUNTER — E-VISIT (OUTPATIENT)
Dept: FAMILY MEDICINE | Facility: CLINIC | Age: 29
End: 2024-07-18
Payer: COMMERCIAL

## 2024-07-18 DIAGNOSIS — M25.531 RIGHT WRIST PAIN: Primary | ICD-10-CM

## 2024-07-18 PROCEDURE — 99421 OL DIG E/M SVC 5-10 MIN: CPT

## 2024-07-24 NOTE — PROGRESS NOTES
ASSESSMENT & PLAN    Artis was seen today for pain.    Diagnoses and all orders for this visit:    De Quervain's tenosynovitis, right  -     Hand Therapy Referral; Future  -     Hand / Upper Extremity Injection/Arthrocentesis: R extensor compartment 1    Right wrist pain  -     Orthopedic  Referral  -     XR Wrist Right G/E 3 Views; Future  -     Hand Therapy Referral; Future  -     Hand / Upper Extremity Injection/Arthrocentesis: R extensor compartment 1      Pt had impression here to do injection today. We discussed use of steroid injection for this type of condition. Ok to do, but typically would advise of doing hand therapy. Injection done, therapy referral placed.  Has brace.  See below.  Questions answered. Discussed signs and symptoms that may indicate more serious issues; the patient was instructed to seek appropriate care if noted. Artis indicates understanding of these issues and agrees with the plan.      See Patient Instructions  Patient Instructions   Right wrist pain consistent with soft tissue source, discussed de Quervain tendinitis.  Reviewed considerations around bracing, imaging, referral to hand therapy.  We also discussed potential for use of medication, steroid injection.  Typically would not do steroid injection first-line for this especially in younger person, but we did steroid injection today based on previous discussion you would had.  Also advised hand therapy referral, placed today.  With combination of these things, monitor 6 weeks to begin.  Bracing is as desired in the meantime.    If you have any further questions for your physician or physician s care team you can contact them thru [email or web address] or by calling [phone].        Injection Discharge Instructions    You may shower, however avoid swimming, tub baths or hot tubs for 24 hours following your procedure  You may have a mild to moderate increase in pain for several days following the injection.  It may take up to 14  days for the steroid medication to start working although you may feel the effect as early as a few days after the procedure.  You may use ice packs for 10-15 minutes, 3 to 4 times a day at the injection site for comfort  You may use anti-inflammatory medications (such as Ibuprofen or Aleve or Advil) if you are able to take safely, or acetaminophen (Tylenol) for pain control if necessary  If you were fasting, you may resume your normal diet and medications after the procedure  If you have diabetes, check your blood sugar more frequently than usual as your blood sugar may be higher than normal for 10-14 days following a steroid injection. Contact your doctor who manages your diabetes if your blood sugar is higher than usual    If you experience any of the following, call the clinic @ [phone]  - Fever over 100 degree F  - Swelling, bleeding, redness, drainage, warmth at the injection site  - New or worsening pain      Rohit Faria [facility]      CC: João Kasper      -----  Chief Complaint   Patient presents with    Right Wrist - Pain       SUBJECTIVE  Artis Chou is a/an 29 year old male who is seen in consultation at the request of  João Kasper C.N.P. for evaluation of right wrist.     The patient is seen with their mother.    Onset: 7 month(s) ago. 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  Additional history:  Has tried wrist wrap also.  Also has thumb spica wrist brace.      REVIEW OF  "SYSTEMS:  Review of Systems    OBJECTIVE:  Ht 1.753 m (5' 9\")   Wt 91.6 kg (202 lb)   BMI 29.83 kg/m           Hand/wrist (right):    Inspection:  No deformity noted.  No swelling. No ecchymosis.    Motion:  Forearm pronation full, forearm supination full.  Wrist flexion min limitation, some pain  Wrist extension mild limitation, some pain  Wrist radial deviation with mild pain  Wrist ulnar deviation with mild pain  Digit motion full, pain with active thumb motion    Strength:  Pain with resisted thumb extension, abduction; mild pain thumb adduction, flexion    Sensation:  Grossly intact light touch.    Radial pulses normal, +2/4, capillary refill brisk.    Palpation:  Tender first dorsal compartment, radial styloid    Finkelstein positive, reproduces pain.      RADIOLOGY:  Final results and radiologist's interpretation, available in the [facility] health record.  Images were reviewed with the patient in the office today.  My personal interpretation of the performed imaging: no acute bony abnormality noted.      Recent Results (from the past 24 hour(s))   XR Wrist Right G/E 3 Views    Narrative    XR WRIST RIGHT G/E 3 VIEWS  7/29/2024 2:33 PM     HISTORY: Radial sided wrist pain; Right wrist pain  COMPARISON: None      Impression    IMPRESSION: No acute fracture or malalignment. There is normal joint  spacing.     VIOLA SAUCEDA MD         SYSTEM ID:  [id number]           Hand / Upper Extremity Injection/Arthrocentesis: R extensor compartment 1    Date/Time: 7/29/2024 3:13 PM    Performed by: Rohit Faria DO  Authorized by: Rohit Faria DO    Indications:  Pain  Needle Size:  25 G  Guidance: landmark    Approach:  Radial  Condition: de Quervain's      Site:  R extensor compartment 1  Medications:  20 mg triamcinolone 40 MG/ML; 0.5 mL lidocaine 1 %  Outcome:  Tolerated well, no immediate complications  Consent Given by:  Patient  Timeout: timeout called immediately prior to procedure    Prep: " patient was prepped and draped in usual sterile fashion     Patient noted some improvement in pain from local anesthetic prior to leaving clinic

## 2024-07-29 ENCOUNTER — OFFICE VISIT (OUTPATIENT)
Dept: ORTHOPEDICS | Facility: CLINIC | Age: 29
End: 2024-07-29
Payer: COMMERCIAL

## 2024-07-29 ENCOUNTER — ANCILLARY PROCEDURE (OUTPATIENT)
Dept: GENERAL RADIOLOGY | Facility: CLINIC | Age: 29
End: 2024-07-29
Attending: PEDIATRICS
Payer: COMMERCIAL

## 2024-07-29 VITALS — WEIGHT: 202 LBS | BODY MASS INDEX: 29.92 KG/M2 | HEIGHT: 69 IN

## 2024-07-29 DIAGNOSIS — M25.531 RIGHT WRIST PAIN: ICD-10-CM

## 2024-07-29 DIAGNOSIS — M65.4 DE QUERVAIN'S TENOSYNOVITIS, RIGHT: Primary | ICD-10-CM

## 2024-07-29 PROCEDURE — 99213 OFFICE O/P EST LOW 20 MIN: CPT | Mod: 25 | Performed by: PEDIATRICS

## 2024-07-29 PROCEDURE — 20550 NJX 1 TENDON SHEATH/LIGAMENT: CPT | Mod: RT | Performed by: PEDIATRICS

## 2024-07-29 PROCEDURE — 73110 X-RAY EXAM OF WRIST: CPT | Mod: TC | Performed by: RADIOLOGY

## 2024-07-29 RX ORDER — TRIAMCINOLONE ACETONIDE 40 MG/ML
20 INJECTION, SUSPENSION INTRA-ARTICULAR; INTRAMUSCULAR
Status: SHIPPED | OUTPATIENT
Start: 2024-07-29

## 2024-07-29 RX ORDER — LIDOCAINE HYDROCHLORIDE 10 MG/ML
0.5 INJECTION, SOLUTION INFILTRATION; PERINEURAL
Status: SHIPPED | OUTPATIENT
Start: 2024-07-29

## 2024-07-29 RX ADMIN — LIDOCAINE HYDROCHLORIDE 0.5 ML: 10 INJECTION, SOLUTION INFILTRATION; PERINEURAL at 15:13

## 2024-07-29 RX ADMIN — TRIAMCINOLONE ACETONIDE 20 MG: 40 INJECTION, SUSPENSION INTRA-ARTICULAR; INTRAMUSCULAR at 15:13

## 2024-07-29 NOTE — PATIENT INSTRUCTIONS
Right wrist pain consistent with soft tissue source, discussed de Quervain tendinitis.  Reviewed considerations around bracing, imaging, referral to hand therapy.  We also discussed potential for use of medication, steroid injection.  Typically would not do steroid injection first-line for this especially in younger person, but we did steroid injection today based on previous discussion you would had.  Also advised hand therapy referral, placed today.  With combination of these things, monitor 6 weeks to begin.  Bracing is as desired in the meantime.    If you have any further questions for your physician or physician s care team you can contact them thru [employer] or by calling [phone].        Injection Discharge Instructions    You may shower, however avoid swimming, tub baths or hot tubs for 24 hours following your procedure  You may have a mild to moderate increase in pain for several days following the injection.  It may take up to 14 days for the steroid medication to start working although you may feel the effect as early as a few days after the procedure.  You may use ice packs for 10-15 minutes, 3 to 4 times a day at the injection site for comfort  You may use anti-inflammatory medications (such as Ibuprofen or Aleve or Advil) if you are able to take safely, or acetaminophen (Tylenol) for pain control if necessary  If you were fasting, you may resume your normal diet and medications after the procedure  If you have diabetes, check your blood sugar more frequently than usual as your blood sugar may be higher than normal for 10-14 days following a steroid injection. Contact your doctor who manages your diabetes if your blood sugar is higher than usual    If you experience any of the following, call the clinic @ [phone]  - Fever over 100 degree F  - Swelling, bleeding, redness, drainage, warmth at the injection site  - New or worsening pain

## 2024-07-29 NOTE — LETTER
7/29/2024      Artis Chou  [address] [place] [address]      Dear Colleague,    Thank you for referring your patient, Artis Chou, to the [facility]. Please see a copy of my visit note below.    ASSESSMENT & PLAN    Artis was seen today for pain.    Diagnoses and all orders for this visit:    De Quervain's tenosynovitis, right  -     Hand Therapy Referral; Future  -     Hand / Upper Extremity Injection/Arthrocentesis: R extensor compartment 1    Right wrist pain  -     Orthopedic  Referral  -     XR Wrist Right G/E 3 Views; Future  -     Hand Therapy Referral; Future  -     Hand / Upper Extremity Injection/Arthrocentesis: R extensor compartment 1      Pt had impression here to do injection today. We discussed use of steroid injection for this type of condition. Ok to do, but typically would advise of doing hand therapy. Injection done, therapy referral placed.  Has brace.  See below.  Questions answered. Discussed signs and symptoms that may indicate more serious issues; the patient was instructed to seek appropriate care if noted. Artis indicates understanding of these issues and agrees with the plan.      See Patient Instructions  Patient Instructions   Right wrist pain consistent with soft tissue source, discussed de Quervain tendinitis.  Reviewed considerations around bracing, imaging, referral to hand therapy.  We also discussed potential for use of medication, steroid injection.  Typically would not do steroid injection first-line for this especially in younger person, but we did steroid injection today based on previous discussion you would had.  Also advised hand therapy referral, placed today.  With combination of these things, monitor 6 weeks to begin.  Bracing is as desired in the meantime.    If you have any further questions for your physician or physician s care team you can contact them thru [employer] or by calling  [phone].        Injection Discharge Instructions    You may shower, however avoid swimming, tub baths or hot tubs for 24 hours following your procedure  You may have a mild to moderate increase in pain for several days following the injection.  It may take up to 14 days for the steroid medication to start working although you may feel the effect as early as a few days after the procedure.  You may use ice packs for 10-15 minutes, 3 to 4 times a day at the injection site for comfort  You may use anti-inflammatory medications (such as Ibuprofen or Aleve or Advil) if you are able to take safely, or acetaminophen (Tylenol) for pain control if necessary  If you were fasting, you may resume your normal diet and medications after the procedure  If you have diabetes, check your blood sugar more frequently than usual as your blood sugar may be higher than normal for 10-14 days following a steroid injection. Contact your doctor who manages your diabetes if your blood sugar is higher than usual    If you experience any of the following, call the clinic @ [phone]  - Fever over 100 degree F  - Swelling, bleeding, redness, drainage, warmth at the injection site  - New or worsening pain      [facility]      CC: João Kasper      -----  Chief Complaint   Patient presents with     Right Wrist - Pain       SUBJECTIVE  Artis Chou is a/an 29 year old male who is seen in consultation at the request of  João Kasper C.N.P. for evaluation of right wrist.     The patient is seen with their mother.    Onset: 7 month(s) ago. Patient describes injury as cleaning in between a grill and cooler and started having some pain in the wrist  Location of Pain: right wrist, radial side   Worsened by: thumb extension, dequervains testing, gripping  Better with:   Treatments tried: wrist bracing, KT taping, ibuprofen, ice packs  Associated symptoms: no distal numbness or  "tingling; denies swelling or warmth    Orthopedic/Surgical history: NO  Social History/Occupation: Cook at [employer]    **  Above information per rooming staff.  Additional history:  Has tried wrist wrap also.  Also has thumb spica wrist brace.      REVIEW OF SYSTEMS:  Review of Systems    OBJECTIVE:  Ht 1.753 m (5' 9\")   Wt 91.6 kg (202 lb)   BMI 29.83 kg/m           Hand/wrist (right):    Inspection:  No deformity noted.  No swelling. No ecchymosis.    Motion:  Forearm pronation full, forearm supination full.  Wrist flexion min limitation, some pain  Wrist extension mild limitation, some pain  Wrist radial deviation with mild pain  Wrist ulnar deviation with mild pain  Digit motion full, pain with active thumb motion    Strength:  Pain with resisted thumb extension, abduction; mild pain thumb adduction, flexion    Sensation:  Grossly intact light touch.    Radial pulses normal, +2/4, capillary refill brisk.    Palpation:  Tender first dorsal compartment, radial styloid    Finkelstein positive, reproduces pain.      RADIOLOGY:  Final results and radiologist's interpretation, available in the [facility] health record.  Images were reviewed with the patient in the office today.  My personal interpretation of the performed imaging: no acute bony abnormality noted.      Recent Results (from the past 24 hour(s))   XR Wrist Right G/E 3 Views    Narrative    XR WRIST RIGHT G/E 3 VIEWS  7/29/2024 2:33 PM     HISTORY: Radial sided wrist pain; Right wrist pain  COMPARISON: None      Impression    IMPRESSION: No acute fracture or malalignment. There is normal joint  spacing.     VIOLA SAUCEDA MD         SYSTEM ID:  [id number]           Hand / Upper Extremity Injection/Arthrocentesis: R extensor compartment 1    Date/Time: 7/29/2024 3:13 PM    Performed by: Rohit Faria DO  Authorized by: Rohit Faria DO    Indications:  Pain  Needle Size:  25 G  Guidance: landmark    Approach:  Radial  Condition: " de Quervain's      Site:  R extensor compartment 1  Medications:  20 mg triamcinolone 40 MG/ML; 0.5 mL lidocaine 1 %  Outcome:  Tolerated well, no immediate complications  Consent Given by:  Patient  Timeout: timeout called immediately prior to procedure    Prep: patient was prepped and draped in usual sterile fashion     Patient noted some improvement in pain from local anesthetic prior to leaving clinic                            Again, thank you for allowing me to participate in the care of your patient.        Sincerely,        Rohit Faria DO

## 2024-08-05 ENCOUNTER — THERAPY VISIT (OUTPATIENT)
Dept: OCCUPATIONAL THERAPY | Facility: CLINIC | Age: 29
End: 2024-08-05
Attending: PEDIATRICS
Payer: COMMERCIAL

## 2024-08-05 DIAGNOSIS — M25.531 RIGHT WRIST PAIN: ICD-10-CM

## 2024-08-05 DIAGNOSIS — M65.4 DE QUERVAIN'S TENOSYNOVITIS, RIGHT: ICD-10-CM

## 2024-08-05 PROCEDURE — 97760 ORTHOTIC MGMT&TRAING 1ST ENC: CPT | Mod: GO | Performed by: OCCUPATIONAL THERAPIST

## 2024-08-05 PROCEDURE — 97535 SELF CARE MNGMENT TRAINING: CPT | Mod: GO | Performed by: OCCUPATIONAL THERAPIST

## 2024-08-05 PROCEDURE — 97166 OT EVAL MOD COMPLEX 45 MIN: CPT | Mod: GO | Performed by: OCCUPATIONAL THERAPIST

## 2024-08-05 NOTE — PROGRESS NOTES
OCCUPATIONAL THERAPY EVALUATION  Type of Visit: Evaluation       Fall Risk Screen:  Fall screen completed by: OT  Have you fallen 2 or more times in the past year?: No  Have you fallen and had an injury in the past year?: No  Is patient a fall risk?: No    Subjective      Presenting condition or subjective complaint: wrist tendent  Date of onset: 12/07/23    Relevant medical history:   Pt states he was cleaning at work in a tight space when his wrist became irritated. Prior to that 11 years ago he had injured his wrist, but that resolved.  PT reports he also has a new phone as of September last year, and he thinks his thumb movement may be bothering it.   Dates & types of surgery: none really    Prior diagnostic imaging/testing results: X-ray     Prior therapy history for the same diagnosis, illness or injury:    None     Prior Level of Function  Transfers: Independent  Ambulation: Independent  ADL: Independent    Living Environment  Social support: With family members   Type of home: House   Stairs to enter the home: Yes       Ramp: No   Stairs inside the home: Yes 20 Is there a railing: Yes     Help at home: None  Equipment owned:   None     Employment: Yes cook/cordinator  Hobbies/Interests: reading movies and games    Patient goals for therapy: want to get it back so i can work with out pain    Pain assessment: Pain present  After the cortisone shot July 19th, it has been feeling better     Objective   ADDITIONAL HISTORY:  Right hand dominant  Patient reports symptoms of pain  Transportation: drives  Currently working in normal job without restrictions    Functional Outcome Measure:   46/80 difficulty with any lifting tasks, most pain when using thumb.     PAIN:  Pain Level at Rest: 0/10  Pain Level with Use: 9/10  Pain Location: wrist and thumb  Pain Quality: Sharp  Pain Frequency: intermittent  Pain is Worst: daytime  Pain is Exacerbated By: thumb use   Pain is Relieved By: cold  Pain Progression: improved  since injection     POSTURE: Normal     EDEMA: None  But tender over 1st dorsal compartment       SCAR/WOUND:  none    SENSATION: WNL throughout all nerve distributions; per patient report     ROM:  WNL but difficulty with Ulnar deviation and weight bearing     OBSERVATIONS/APPEARANCE:      SPECIAL TESTS:   De Quervain's Special Tests  Pain Report Left Right   Finkelstein's Test - ++   Radial Nerve Tinel's (DRSN) - -   Joint Laxity of Trapezium - -   Crepitus Present - -   Thumb CMC Joint Adduction Stress Test - -   Thumb CMC Joint Extension Stress Test - +   Thumb Abduction - ++       STRENGTH:     Measured in pounds 8/5/2024 8/5/2024    Left Right   Trial 1 65 85     Lateral Pinch  Measured in pounds 8/5/2024 8/5/2024    Left Right   Trial 1 16 18     3 Point Pinch  Measured in pounds 8/5/2024 8/5/2024    Left Right   Trial 1 16 22       PALPATION:  tender over 1st dorsal compartment       Assessment & Plan   CLINICAL IMPRESSIONS  Medical Diagnosis: Right wrist pain    Treatment Diagnosis: DeQuervains Tenosynovitis    Impression/Assessment: Pt is a 29 year old male presenting to Occupational Therapy due to Right DeQuervains tensosynovitis.  The following significant findings have been identified: Impaired activity tolerance and Pain.  These identified deficits interfere with their ability to perform self care tasks, work tasks, recreational activities, household chores, and meal planning and preparation as compared to previous level of function.   Patient's limitations or Problem List includes: Pain of the right wrist and thumb which interferes with the patient's ability to perform Self Care Tasks (bathing), Work Tasks, Sleep Patterns, and Household Chores as compared to previous level of function.    Clinical Decision Making (Complexity):  Assessment of Occupational Performance: 3-5 Performance Deficits  Occupational Performance Limitations: bathing/showering, home establishment and management, meal  preparation and cleanup, shopping, and sleep  Clinical Decision Making (Complexity): Moderate complexity    PLAN OF CARE  Treatment Interventions:  Modalities:  US  Therapeutic Exercise:  AROM, AAROM, PROM, Tendon Gliding, Isotonics, Isometrics, and Stabilization  Manual Techniques:  Myofascial release and Manual edema mobilization  Orthotic Fabrication:  Static, Hand based, and Thumboform custom molded  Self Care:  Self Care Tasks, Ergonomic Considerations, and Work Tasks    Long Term Goals   OT Goal 1  Goal Identifier: Reduce Pain  Goal Description: Pt will be able to lift groceries with strainght wrist without pain  Rationale: In order to maximize safety and independence with ADL/IADLs  Target Date: 10/05/24      Frequency of Treatment: 1x per week  Duration of Treatment: 2 months     Education Assessment: Learner/Method: Patient;Listening;Reading;Demonstration;No Barriers to Learning     Risks and benefits of evaluation/treatment have been explained.   Patient/Family/caregiver agrees with Plan of Care.     Evaluation Time:    OT Eval, Moderate Complexity Minutes (49089): 25  Signing Clinician: Glenda Szymanski OT

## 2024-08-20 ENCOUNTER — THERAPY VISIT (OUTPATIENT)
Dept: OCCUPATIONAL THERAPY | Facility: CLINIC | Age: 29
End: 2024-08-20
Attending: PEDIATRICS
Payer: COMMERCIAL

## 2024-08-20 DIAGNOSIS — M25.531 RIGHT WRIST PAIN: Primary | ICD-10-CM

## 2024-08-20 PROCEDURE — 97110 THERAPEUTIC EXERCISES: CPT | Mod: GO | Performed by: OCCUPATIONAL THERAPIST

## 2024-09-08 ENCOUNTER — MYC REFILL (OUTPATIENT)
Dept: FAMILY MEDICINE | Facility: CLINIC | Age: 29
End: 2024-09-08
Payer: COMMERCIAL

## 2024-09-08 DIAGNOSIS — G44.209 TENSION HEADACHE: ICD-10-CM

## 2024-09-08 DIAGNOSIS — F90.0 ATTENTION DEFICIT HYPERACTIVITY DISORDER, INATTENTIVE TYPE, MODERATE: ICD-10-CM

## 2024-09-09 RX ORDER — METHOCARBAMOL 500 MG/1
500 TABLET, FILM COATED ORAL 4 TIMES DAILY PRN
Qty: 30 TABLET | Refills: 1 | Status: SHIPPED | OUTPATIENT
Start: 2024-09-09

## 2024-09-10 RX ORDER — DEXTROAMPHETAMINE SACCHARATE, AMPHETAMINE ASPARTATE MONOHYDRATE, DEXTROAMPHETAMINE SULFATE AND AMPHETAMINE SULFATE 5; 5; 5; 5 MG/1; MG/1; MG/1; MG/1
20 CAPSULE, EXTENDED RELEASE ORAL DAILY
Qty: 30 CAPSULE | Refills: 0 | Status: SHIPPED | OUTPATIENT
Start: 2024-09-10

## 2024-09-10 NOTE — TELEPHONE ENCOUNTER
Please confirm with pt who he considers PCP at this point and change in chart if needed.    Elen Box PA-C

## 2024-10-13 DIAGNOSIS — F33.1 MAJOR DEPRESSIVE DISORDER, RECURRENT EPISODE, MODERATE (H): ICD-10-CM

## 2024-10-13 DIAGNOSIS — F41.1 GAD (GENERALIZED ANXIETY DISORDER): ICD-10-CM

## 2024-10-14 RX ORDER — SERTRALINE HYDROCHLORIDE 100 MG/1
200 TABLET, FILM COATED ORAL DAILY
Qty: 180 TABLET | Refills: 0 | Status: SHIPPED | OUTPATIENT
Start: 2024-10-14

## 2024-10-18 ENCOUNTER — VIRTUAL VISIT (OUTPATIENT)
Dept: FAMILY MEDICINE | Facility: CLINIC | Age: 29
End: 2024-10-18
Payer: COMMERCIAL

## 2024-10-18 DIAGNOSIS — F90.0 ATTENTION DEFICIT HYPERACTIVITY DISORDER, INATTENTIVE TYPE, MODERATE: Primary | ICD-10-CM

## 2024-10-18 DIAGNOSIS — R03.0 ELEVATED BP WITHOUT DIAGNOSIS OF HYPERTENSION: ICD-10-CM

## 2024-10-18 PROCEDURE — 99214 OFFICE O/P EST MOD 30 MIN: CPT | Mod: 95

## 2024-10-18 PROCEDURE — G2211 COMPLEX E/M VISIT ADD ON: HCPCS | Mod: 95

## 2024-10-18 RX ORDER — DEXTROAMPHETAMINE SACCHARATE, AMPHETAMINE ASPARTATE MONOHYDRATE, DEXTROAMPHETAMINE SULFATE AND AMPHETAMINE SULFATE 5; 5; 5; 5 MG/1; MG/1; MG/1; MG/1
20 CAPSULE, EXTENDED RELEASE ORAL DAILY
Qty: 30 CAPSULE | Refills: 0 | Status: SHIPPED | OUTPATIENT
Start: 2024-12-17 | End: 2025-01-16

## 2024-10-18 RX ORDER — DEXTROAMPHETAMINE SACCHARATE, AMPHETAMINE ASPARTATE MONOHYDRATE, DEXTROAMPHETAMINE SULFATE AND AMPHETAMINE SULFATE 5; 5; 5; 5 MG/1; MG/1; MG/1; MG/1
20 CAPSULE, EXTENDED RELEASE ORAL DAILY
Qty: 30 CAPSULE | Refills: 0 | Status: CANCELLED | OUTPATIENT
Start: 2024-10-18

## 2024-10-18 RX ORDER — DEXTROAMPHETAMINE SACCHARATE, AMPHETAMINE ASPARTATE, DEXTROAMPHETAMINE SULFATE AND AMPHETAMINE SULFATE 1.25; 1.25; 1.25; 1.25 MG/1; MG/1; MG/1; MG/1
5 TABLET ORAL DAILY PRN
Qty: 20 TABLET | Refills: 0 | Status: CANCELLED | OUTPATIENT
Start: 2024-10-18

## 2024-10-18 RX ORDER — DEXTROAMPHETAMINE SACCHARATE, AMPHETAMINE ASPARTATE MONOHYDRATE, DEXTROAMPHETAMINE SULFATE AND AMPHETAMINE SULFATE 5; 5; 5; 5 MG/1; MG/1; MG/1; MG/1
20 CAPSULE, EXTENDED RELEASE ORAL DAILY
Qty: 30 CAPSULE | Refills: 0 | Status: SHIPPED | OUTPATIENT
Start: 2024-11-17 | End: 2024-12-17

## 2024-10-18 RX ORDER — DEXTROAMPHETAMINE SACCHARATE, AMPHETAMINE ASPARTATE, DEXTROAMPHETAMINE SULFATE AND AMPHETAMINE SULFATE 1.25; 1.25; 1.25; 1.25 MG/1; MG/1; MG/1; MG/1
5 TABLET ORAL DAILY
Qty: 30 TABLET | Refills: 0 | Status: CANCELLED | OUTPATIENT
Start: 2024-10-18

## 2024-10-18 RX ORDER — DEXTROAMPHETAMINE SACCHARATE, AMPHETAMINE ASPARTATE MONOHYDRATE, DEXTROAMPHETAMINE SULFATE AND AMPHETAMINE SULFATE 5; 5; 5; 5 MG/1; MG/1; MG/1; MG/1
20 CAPSULE, EXTENDED RELEASE ORAL DAILY
Qty: 30 CAPSULE | Refills: 0 | Status: SHIPPED | OUTPATIENT
Start: 2024-10-18 | End: 2024-11-17

## 2024-10-18 NOTE — PROGRESS NOTES
Artis is a 29 year old who is being evaluated via a billable video visit.    How would you like to obtain your AVS? MyChart  If the video visit is dropped, the invitation should be resent by: Text to cell phone: [phone]  Will anyone else be joining your video visit? No      Assessment & Plan     Attention deficit hyperactivity disorder, inattentive type, moderate  Next follow  up in person  - Pt to select primary provider for future refills   - amphetamine-dextroamphetamine (ADDERALL XR) 20 MG 24 hr capsule  Dispense: 30 capsule; Refill: 0  - amphetamine-dextroamphetamine (ADDERALL XR) 20 MG 24 hr capsule  Dispense: 30 capsule; Refill: 0  - amphetamine-dextroamphetamine (ADDERALL XR) 20 MG 24 hr capsule  Dispense: 30 capsule; Refill: 0      Elevated BP without diagnosis of hypertension  Rambo home BP BID 5-7 days and report findings       Follow-up in 3 months in clinic for med check      Subjective   Artis is a 29 year old, presenting for the following health issues:    Patient reports for ADHD recheck.  Currently on 20 mg XR daily, and 5 mg IR as needed.  Averages 3-5 as needed doses weekly.  Does not require refill for 5 mg dose at this time. Working well. Denies- anxiety, changes to insomnia, wt loss, agitation.     Of note-discussed with patient prescribing practices for Adderall to include sole provider/PCM relationship.  Patient agreeable to this and will follow up with desired provider.    Additionally of note-patient acutely hypertensive in clinic on previous visit, was instructed to monitor BP and report findings.  Was unable to do this, reviewed instructions-patient agreeable to monitor report findings.    Recheck Medication (REFILL)        10/18/2024     4:14 PM   Additional Questions   Roomed by BAN ARDON         Review of Systems  Constitutional, HEENT, cardiovascular, pulmonary, gi and gu systems are negative, except as otherwise noted.      Objective           Vitals:  No vitals were  obtained today due to virtual visit.    Physical Exam   GENERAL: alert and no distress  EYES: Eyes grossly normal to inspection.  No discharge or erythema, or obvious scleral/conjunctival abnormalities.  RESP: No audible wheeze, cough, or visible cyanosis.    SKIN: Visible skin clear. No significant rash, abnormal pigmentation or lesions.  NEURO: Cranial nerves grossly intact.  Mentation and speech appropriate for age.  PSYCH: Appropriate affect, tone, and pace of words          Video-Visit Details    Type of service:  Video Visit   Originating Location (pt. Location): Home    Distant Location (provider location):  On-site  Platform used for Video Visit: Rae  Signed Electronically by: GIULIANO Kumar CNP  The longitudinal plan of care for the diagnosis(es)/condition(s) as documented were addressed during this visit. Due to the added complexity in care, I will continue to support Artis in the subsequent management and with ongoing continuity of care.

## 2024-10-21 ENCOUNTER — MYC REFILL (OUTPATIENT)
Dept: FAMILY MEDICINE | Facility: CLINIC | Age: 29
End: 2024-10-21
Payer: COMMERCIAL

## 2024-10-21 DIAGNOSIS — G47.9 SLEEPING DIFFICULTIES: ICD-10-CM

## 2024-10-21 DIAGNOSIS — F41.1 GAD (GENERALIZED ANXIETY DISORDER): ICD-10-CM

## 2024-10-21 RX ORDER — GABAPENTIN 300 MG/1
CAPSULE ORAL
Qty: 270 CAPSULE | Refills: 0 | Status: SHIPPED | OUTPATIENT
Start: 2024-10-21

## 2024-11-17 ENCOUNTER — MYC REFILL (OUTPATIENT)
Dept: FAMILY MEDICINE | Facility: CLINIC | Age: 29
End: 2024-11-17
Payer: COMMERCIAL

## 2024-11-17 DIAGNOSIS — F90.0 ATTENTION DEFICIT HYPERACTIVITY DISORDER, INATTENTIVE TYPE, MODERATE: ICD-10-CM

## 2024-11-20 RX ORDER — DEXTROAMPHETAMINE SACCHARATE, AMPHETAMINE ASPARTATE, DEXTROAMPHETAMINE SULFATE AND AMPHETAMINE SULFATE 1.25; 1.25; 1.25; 1.25 MG/1; MG/1; MG/1; MG/1
5 TABLET ORAL DAILY PRN
Qty: 20 TABLET | Refills: 0 | OUTPATIENT
Start: 2024-11-20

## 2024-11-21 NOTE — TELEPHONE ENCOUNTER
I called and spoke with the patient and he said he was just seen a month ago and doesn't see why he needs another appointment to get a refill. I told him I was just relaying the message from the João Kasper that an appointment is needed. The patient then proceeded to say I don't know what is wrong with that provider to make me do another appointment and not just refill my prescription. I told him I will send a message to the provider to see what he says.     Merry Baez [facility]

## 2024-11-21 NOTE — TELEPHONE ENCOUNTER
RETURNED CALL  PATIENT INFORMED THAT HE NEEDS TO BE SEEN IN CLINIC (NOT VIRTUAL) FOR REFILLS ON HIS MEDICATION. HE ALSO IS OVERDUE TO HAVE HIS BP CHECKED.   OFFERED TO ASSIST IN SCHEDULING AND PATIENT POLITELY DECLINED, STATING THAT HE WILL DO THAT ON HIS OWN TIME.    Mai Mayfield CMA on 11/21/2024 at 3:35 PM

## 2024-11-21 NOTE — TELEPHONE ENCOUNTER
Please call pt. Needs in person appt prior to future refills. Additionally needs to select PCM for primary management. - João Kasper, DNP

## 2024-11-26 PROBLEM — M25.531 RIGHT WRIST PAIN: Status: RESOLVED | Noted: 2024-08-05 | Resolved: 2024-11-26

## 2024-12-16 ENCOUNTER — VIRTUAL VISIT (OUTPATIENT)
Dept: FAMILY MEDICINE | Facility: CLINIC | Age: 29
End: 2024-12-16
Payer: COMMERCIAL

## 2024-12-16 DIAGNOSIS — F90.0 ATTENTION DEFICIT HYPERACTIVITY DISORDER, INATTENTIVE TYPE, MODERATE: ICD-10-CM

## 2024-12-16 DIAGNOSIS — R03.0 ELEVATED BP WITHOUT DIAGNOSIS OF HYPERTENSION: ICD-10-CM

## 2024-12-16 DIAGNOSIS — G47.00 INSOMNIA, UNSPECIFIED TYPE: Primary | ICD-10-CM

## 2024-12-16 PROCEDURE — 99214 OFFICE O/P EST MOD 30 MIN: CPT | Mod: 95

## 2024-12-16 PROCEDURE — G2211 COMPLEX E/M VISIT ADD ON: HCPCS | Mod: 95

## 2024-12-16 RX ORDER — DEXTROAMPHETAMINE SACCHARATE, AMPHETAMINE ASPARTATE MONOHYDRATE, DEXTROAMPHETAMINE SULFATE AND AMPHETAMINE SULFATE 5; 5; 5; 5 MG/1; MG/1; MG/1; MG/1
20 CAPSULE, EXTENDED RELEASE ORAL DAILY
Qty: 30 CAPSULE | Refills: 0 | Status: CANCELLED | OUTPATIENT
Start: 2024-12-16

## 2024-12-16 RX ORDER — DEXTROAMPHETAMINE SACCHARATE, AMPHETAMINE ASPARTATE, DEXTROAMPHETAMINE SULFATE AND AMPHETAMINE SULFATE 1.25; 1.25; 1.25; 1.25 MG/1; MG/1; MG/1; MG/1
5 TABLET ORAL DAILY PRN
Qty: 30 TABLET | Refills: 0 | Status: SHIPPED | OUTPATIENT
Start: 2024-12-16

## 2024-12-16 RX ORDER — DEXTROAMPHETAMINE SACCHARATE, AMPHETAMINE ASPARTATE, DEXTROAMPHETAMINE SULFATE AND AMPHETAMINE SULFATE 1.25; 1.25; 1.25; 1.25 MG/1; MG/1; MG/1; MG/1
5 TABLET ORAL DAILY
Qty: 30 TABLET | Refills: 0 | Status: SHIPPED | OUTPATIENT
Start: 2024-12-16

## 2024-12-16 RX ORDER — DEXTROAMPHETAMINE SACCHARATE, AMPHETAMINE ASPARTATE MONOHYDRATE, DEXTROAMPHETAMINE SULFATE AND AMPHETAMINE SULFATE 5; 5; 5; 5 MG/1; MG/1; MG/1; MG/1
20 CAPSULE, EXTENDED RELEASE ORAL DAILY
Qty: 30 CAPSULE | Refills: 0 | Status: SHIPPED | OUTPATIENT
Start: 2024-12-16 | End: 2025-01-15

## 2024-12-16 RX ORDER — DEXTROAMPHETAMINE SACCHARATE, AMPHETAMINE ASPARTATE MONOHYDRATE, DEXTROAMPHETAMINE SULFATE AND AMPHETAMINE SULFATE 5; 5; 5; 5 MG/1; MG/1; MG/1; MG/1
20 CAPSULE, EXTENDED RELEASE ORAL DAILY
Qty: 30 CAPSULE | Refills: 0 | Status: SHIPPED | OUTPATIENT
Start: 2025-01-15 | End: 2025-02-14

## 2024-12-16 RX ORDER — DEXTROAMPHETAMINE SACCHARATE, AMPHETAMINE ASPARTATE MONOHYDRATE, DEXTROAMPHETAMINE SULFATE AND AMPHETAMINE SULFATE 5; 5; 5; 5 MG/1; MG/1; MG/1; MG/1
20 CAPSULE, EXTENDED RELEASE ORAL DAILY
Qty: 30 CAPSULE | Refills: 0 | Status: CANCELLED | OUTPATIENT
Start: 2024-12-17

## 2024-12-16 RX ORDER — DEXTROAMPHETAMINE SACCHARATE, AMPHETAMINE ASPARTATE MONOHYDRATE, DEXTROAMPHETAMINE SULFATE AND AMPHETAMINE SULFATE 5; 5; 5; 5 MG/1; MG/1; MG/1; MG/1
20 CAPSULE, EXTENDED RELEASE ORAL DAILY
Qty: 30 CAPSULE | Refills: 0 | Status: SHIPPED | OUTPATIENT
Start: 2025-02-14 | End: 2025-03-16

## 2024-12-16 ASSESSMENT — PATIENT HEALTH QUESTIONNAIRE - PHQ9
SUM OF ALL RESPONSES TO PHQ QUESTIONS 1-9: 4
10. IF YOU CHECKED OFF ANY PROBLEMS, HOW DIFFICULT HAVE THESE PROBLEMS MADE IT FOR YOU TO DO YOUR WORK, TAKE CARE OF THINGS AT HOME, OR GET ALONG WITH OTHER PEOPLE: SOMEWHAT DIFFICULT
SUM OF ALL RESPONSES TO PHQ QUESTIONS 1-9: 4

## 2024-12-16 ASSESSMENT — ANXIETY QUESTIONNAIRES
3. WORRYING TOO MUCH ABOUT DIFFERENT THINGS: SEVERAL DAYS
4. TROUBLE RELAXING: NOT AT ALL
GAD7 TOTAL SCORE: 2
7. FEELING AFRAID AS IF SOMETHING AWFUL MIGHT HAPPEN: NOT AT ALL
6. BECOMING EASILY ANNOYED OR IRRITABLE: SEVERAL DAYS
2. NOT BEING ABLE TO STOP OR CONTROL WORRYING: NOT AT ALL
GAD7 TOTAL SCORE: 2
7. FEELING AFRAID AS IF SOMETHING AWFUL MIGHT HAPPEN: NOT AT ALL
IF YOU CHECKED OFF ANY PROBLEMS ON THIS QUESTIONNAIRE, HOW DIFFICULT HAVE THESE PROBLEMS MADE IT FOR YOU TO DO YOUR WORK, TAKE CARE OF THINGS AT HOME, OR GET ALONG WITH OTHER PEOPLE: NOT DIFFICULT AT ALL
1. FEELING NERVOUS, ANXIOUS, OR ON EDGE: NOT AT ALL
5. BEING SO RESTLESS THAT IT IS HARD TO SIT STILL: NOT AT ALL
8. IF YOU CHECKED OFF ANY PROBLEMS, HOW DIFFICULT HAVE THESE MADE IT FOR YOU TO DO YOUR WORK, TAKE CARE OF THINGS AT HOME, OR GET ALONG WITH OTHER PEOPLE?: NOT DIFFICULT AT ALL
GAD7 TOTAL SCORE: 2

## 2024-12-16 NOTE — PROGRESS NOTES
Artis is a 29 year old who is being evaluated via a billable video visit.    How would you like to obtain your AVS? MyChart  If the video visit is dropped, the invitation should be resent by: Text to cell phone: [phone]  Will anyone else be joining your video visit? No      Assessment & Plan     Attention deficit hyperactivity disorder, inattentive type, moderate  - amphetamine-dextroamphetamine (ADDERALL) 5 MG tablet  Dispense: 30 tablet; Refill: 0  - amphetamine-dextroamphetamine (ADDERALL) 5 MG tablet  Dispense: 30 tablet; Refill: 0  - amphetamine-dextroamphetamine (ADDERALL) 5 MG tablet  Dispense: 30 tablet; Refill: 0  - amphetamine-dextroamphetamine (ADDERALL XR) 20 MG 24 hr capsule  Dispense: 30 capsule; Refill: 0  - amphetamine-dextroamphetamine (ADDERALL XR) 20 MG 24 hr capsule  Dispense: 30 capsule; Refill: 0  - amphetamine-dextroamphetamine (ADDERALL XR) 20 MG 24 hr capsule  Dispense: 30 capsule; Refill: 0    Insomnia, unspecified type  Well controlled  Continue gabapentin as previously prescribed- using for several years  Would consider CBTI/transition to other agent given stimulant use     Elevated BP without diagnosis of hypertension  Monitor BP BID 5-10 days and report findings      Follow up in clinic for BP check and routine annual prior to next refill.     Subjective   Artis is a 29 year old, presenting for the following health issues:  Recheck Medication (REFILLS)    Pt reports for adderall refill. Previously trialed dropping IR dosing on days he does not work- found early symptom return to be troublesome- would like to resume daily 5mg IR dosing.     Med rec reviewed- pt on gabapentin daily for insomnia since 2021  Describes as initiation and maintenance. Reports this is working well. Reviewed concerns with concurrent adderall us as potential  of insomnia. Pt open to trialing other therapies/medications in future.           12/16/2024     6:54 AM   Additional Questions   Roomed by  BAN     HPI         Review of Systems  Constitutional, HEENT, cardiovascular, pulmonary, gi and gu systems are negative, except as otherwise noted.      Objective           Vitals:  No vitals were obtained today due to virtual visit.    Physical Exam   GENERAL: alert and no distress  EYES: Eyes grossly normal to inspection.  No discharge or erythema, or obvious scleral/conjunctival abnormalities.  RESP: No audible wheeze, cough, or visible cyanosis.    SKIN: Visible skin clear. No significant rash, abnormal pigmentation or lesions.  NEURO: Cranial nerves grossly intact.  Mentation and speech appropriate for age.  PSYCH: Appropriate affect, tone, and pace of words        Video-Visit Details    Type of service:  Video Visit   Originating Location (pt. Location): Home    Distant Location (provider location):  Off-site  Platform used for Video Visit: Rae  Signed Electronically by: GIULIANO Kumar CNP  The longitudinal plan of care for the diagnosis(es)/condition(s) as documented were addressed during this visit. Due to the added complexity in care, I will continue to support Artis in the subsequent management and with ongoing continuity of care.

## 2025-01-15 DIAGNOSIS — F41.1 GAD (GENERALIZED ANXIETY DISORDER): ICD-10-CM

## 2025-01-15 DIAGNOSIS — F33.1 MAJOR DEPRESSIVE DISORDER, RECURRENT EPISODE, MODERATE (H): ICD-10-CM

## 2025-01-15 RX ORDER — SERTRALINE HYDROCHLORIDE 100 MG/1
200 TABLET, FILM COATED ORAL DAILY
Qty: 180 TABLET | Refills: 2 | Status: SHIPPED | OUTPATIENT
Start: 2025-01-15

## 2025-02-20 ENCOUNTER — E-VISIT (OUTPATIENT)
Dept: FAMILY MEDICINE | Facility: CLINIC | Age: 30
End: 2025-02-20
Payer: COMMERCIAL

## 2025-02-20 DIAGNOSIS — R06.2 WHEEZING: Primary | ICD-10-CM

## 2025-02-20 NOTE — PATIENT INSTRUCTIONS
Dear Artis Chou,    We are sorry you are not feeling well. Based on the responses you provided, it is recommended that you be seen in-person in urgent care so we can better evaluate your symptoms. Please click here to find the nearest urgent care location to you.   You will not be charged for this Visit. Thank you for trusting us with your care.    GIULIANO Kumar CNP

## 2025-02-27 DIAGNOSIS — G47.9 SLEEPING DIFFICULTIES: ICD-10-CM

## 2025-02-27 DIAGNOSIS — F41.1 GAD (GENERALIZED ANXIETY DISORDER): ICD-10-CM

## 2025-02-27 RX ORDER — GABAPENTIN 300 MG/1
CAPSULE ORAL
Qty: 90 CAPSULE | Refills: 0 | Status: SHIPPED | OUTPATIENT
Start: 2025-02-27

## 2025-04-10 PROBLEM — M65.4 DE QUERVAIN'S TENOSYNOVITIS, RIGHT: Status: RESOLVED | Noted: 2024-08-05 | Resolved: 2025-04-10

## 2025-04-10 PROBLEM — R03.0 ELEVATED BP WITHOUT DIAGNOSIS OF HYPERTENSION: Status: RESOLVED | Noted: 2024-12-16 | Resolved: 2025-04-10

## 2025-04-10 PROBLEM — M54.50 ACUTE MIDLINE LOW BACK PAIN WITHOUT SCIATICA: Status: RESOLVED | Noted: 2023-04-20 | Resolved: 2025-04-10

## 2025-05-08 ENCOUNTER — PATIENT OUTREACH (OUTPATIENT)
Dept: CARE COORDINATION | Facility: CLINIC | Age: 30
End: 2025-05-08
Payer: COMMERCIAL

## 2025-06-04 ENCOUNTER — TELEPHONE (OUTPATIENT)
Dept: FAMILY MEDICINE | Facility: CLINIC | Age: 30
End: 2025-06-04
Payer: COMMERCIAL

## 2025-06-04 NOTE — TELEPHONE ENCOUNTER
Prior Authorization Approval    Medication: AMPHETAMINE-DEXTROAMPHETAMINE 5 MG PO TABS  Authorization Effective Date: 6/4/2025  Authorization Expiration Date:    Approved Dose/Quantity:   Reference #: [id number]   Insurance Company: [employer] - Phone [phone] Fax [phone]  Expected CoPay: $    CoPay Card Available:      Financial Assistance Needed:   Which Pharmacy is filling the prescription: [id number] - [place], [place] - [address] AT [address] OF [facility]  Pharmacy Notified: YES  Patient Notified: Instructed pharmacy to notify patient when script is ready to pick-up/ship

## 2025-06-04 NOTE — TELEPHONE ENCOUNTER
Prior Authorization Specialty Medication Request    Medication/Dose: adderall  Diagnosis and ICD code (if different than what is on RX):    New/renewal/insurance change PA/secondary ins. PA:  Previously Tried and Failed:      Important Lab Values:   Rationale:     Insurance   Primary: [employer]   Insurance ID:  [id number]     Secondary (if applicable):  Insurance ID:      Pharmacy Information (if different than what is on RX)  Name:    Phone:    Fax:    Clinic Information  Preferred routing pool for dept communication:

## 2025-06-28 DIAGNOSIS — G47.00 INSOMNIA, UNSPECIFIED TYPE: ICD-10-CM

## 2025-06-28 NOTE — LETTER
[facility]  [address] [facility] [address]  [phone]          July 1, 2025    Artis Chou                                                                                                                     [address] [place] [address]            Dear Artis,    We have tried to reach you by phone at [phone], but this number is not in service.    A refill for gabapentin (NEURONTIN) 300 MG capsule was sent to your pharmacy on 6-.    You are over due for labs and a blood pressure follow up. No additional refills will be given until you have scheduled an appointment.    Please call the clinic at your earliest convenience to schedule an appointment.  Please also update your phone number.  Feel free to contact us with any questions or concerns.    Sincerely,       [facility] [employer]

## 2025-06-30 RX ORDER — GABAPENTIN 300 MG/1
CAPSULE ORAL
Qty: 90 CAPSULE | Refills: 0 | Status: SHIPPED | OUTPATIENT
Start: 2025-06-30

## 2025-07-01 NOTE — TELEPHONE ENCOUNTER
Unable to reach patient at [phone], as this phone number is not in service.    Letter sent to patient informing him to schedule appointment for further refills.  Lilian Dumont,

## 2025-07-05 ENCOUNTER — MYC REFILL (OUTPATIENT)
Dept: FAMILY MEDICINE | Facility: CLINIC | Age: 30
End: 2025-07-05
Payer: COMMERCIAL

## 2025-07-07 RX ORDER — DEXTROAMPHETAMINE SACCHARATE, AMPHETAMINE ASPARTATE MONOHYDRATE, DEXTROAMPHETAMINE SULFATE AND AMPHETAMINE SULFATE 5; 5; 5; 5 MG/1; MG/1; MG/1; MG/1
20 CAPSULE, EXTENDED RELEASE ORAL DAILY
OUTPATIENT
Start: 2025-07-07

## 2025-07-07 NOTE — TELEPHONE ENCOUNTER
Contacts       Contact Date/Time Type Contact Phone/Fax    07/05/2025 12:57 PM CDT Web (Incoming) Artis Chou (Self)     07/07/2025 11:28 AM CDT Phone (Outgoing) Artis Chou (Self) [phone] (M)    Not Available           Attempted to reach patient to: Schedule an appointment    When patient returns call, please take this action: Assist with scheduling    Reason for the visit: Medication follow up  When to schedule: Next available    Additional comments/info: Please help patient get a scheduled appointment with their provider.     João Kasper APRN CNP to [facility]   7/7/25  8:57 AM  Due for follow-up visit/labs.  Please assist to schedule.  Once scheduled will provide bridge fill    If unable to schedule: Transfer to  line (or update telephone encounter in after-hours)     Refused Prescriptions   amphetamine-dextroamphetamine (ADDERALL XR) 20 MG 24 hr capsule         Sig: Take 1 capsule (20 mg) by mouth daily.    Disp: Not specified    Refills:    Start: 7/7/2025    Earliest Fill Date: 7/7/2025    Class: E-Prescribe    Refused by: João Kasper APRN CNP    Refusal reason: Patient needs appointment        Fill requested from: [id number] - [place], [place] - [address] AT [facility]

## 2025-07-13 ENCOUNTER — HEALTH MAINTENANCE LETTER (OUTPATIENT)
Age: 30
End: 2025-07-13